# Patient Record
Sex: MALE | Race: BLACK OR AFRICAN AMERICAN | NOT HISPANIC OR LATINO | Employment: UNEMPLOYED | URBAN - METROPOLITAN AREA
[De-identification: names, ages, dates, MRNs, and addresses within clinical notes are randomized per-mention and may not be internally consistent; named-entity substitution may affect disease eponyms.]

---

## 2017-01-12 ENCOUNTER — TELEPHONE (OUTPATIENT)
Dept: PHARMACY | Facility: CLINIC | Age: 47
End: 2017-01-12

## 2017-01-13 ENCOUNTER — TELEPHONE (OUTPATIENT)
Dept: PHARMACY | Facility: CLINIC | Age: 47
End: 2017-01-13

## 2017-01-26 ENCOUNTER — LAB VISIT (OUTPATIENT)
Dept: LAB | Facility: HOSPITAL | Age: 47
End: 2017-01-26
Attending: FAMILY MEDICINE
Payer: MEDICAID

## 2017-01-26 ENCOUNTER — OFFICE VISIT (OUTPATIENT)
Dept: INTERNAL MEDICINE | Facility: CLINIC | Age: 47
End: 2017-01-26
Payer: MEDICAID

## 2017-01-26 VITALS
SYSTOLIC BLOOD PRESSURE: 144 MMHG | HEART RATE: 75 BPM | DIASTOLIC BLOOD PRESSURE: 100 MMHG | BODY MASS INDEX: 40.39 KG/M2 | TEMPERATURE: 99 F | HEIGHT: 69 IN | WEIGHT: 272.69 LBS

## 2017-01-26 DIAGNOSIS — E29.1 MALE HYPOGONADISM: Primary | ICD-10-CM

## 2017-01-26 DIAGNOSIS — D35.2 PROLACTINOMA: ICD-10-CM

## 2017-01-26 DIAGNOSIS — N40.0 BENIGN NON-NODULAR PROSTATIC HYPERPLASIA WITHOUT LOWER URINARY TRACT SYMPTOMS: ICD-10-CM

## 2017-01-26 DIAGNOSIS — E29.1 MALE HYPOGONADISM: ICD-10-CM

## 2017-01-26 LAB
PROLACTIN SERPL IA-MCNC: 4.8 NG/ML
TESTOST SERPL-MCNC: 204 NG/DL

## 2017-01-26 PROCEDURE — 84402 ASSAY OF FREE TESTOSTERONE: CPT

## 2017-01-26 PROCEDURE — 84403 ASSAY OF TOTAL TESTOSTERONE: CPT

## 2017-01-26 PROCEDURE — 84146 ASSAY OF PROLACTIN: CPT

## 2017-01-26 PROCEDURE — 99213 OFFICE O/P EST LOW 20 MIN: CPT | Mod: S$PBB,,, | Performed by: FAMILY MEDICINE

## 2017-01-26 PROCEDURE — 99999 PR PBB SHADOW E&M-EST. PATIENT-LVL III: CPT | Mod: PBBFAC,,, | Performed by: FAMILY MEDICINE

## 2017-01-26 NOTE — PROGRESS NOTES
Subjective:       Patient ID: Grant Rodriguez is a 46 y.o. male.    Chief Complaint: Annual Exam    HPI Comments: Annual Exam;       Pt is a 46 year old who just had labs in July 2016 all was fine. Pt has a prolactinoma and bladder issue. Pt is reporting better bladder flow and will continue current meds. Pt will be followed up for the prolactinoma. Discussed with pt his testosterone level.     Review of Systems   Constitutional: Negative.    Respiratory: Negative.    Cardiovascular: Negative.    Genitourinary: Negative.    Neurological: Negative.    Hematological: Negative.        Objective:      Physical Exam   Constitutional: He is oriented to person, place, and time. He appears well-developed and well-nourished.   Cardiovascular: Normal rate and regular rhythm.    Pulmonary/Chest: Effort normal and breath sounds normal.   Neurological: He is alert and oriented to person, place, and time.       Assessment:       1. Male hypogonadism    2. Benign non-nodular prostatic hyperplasia without lower urinary tract symptoms        Plan:       Male hypogonadism  Comments:  Will do Free and total testosterone  Orders:  -     Testosterone, free; Future; Expected date: 1/26/17  -     Testosterone; Future; Expected date: 1/26/17    Benign non-nodular prostatic hyperplasia without lower urinary tract symptoms  Comments:  Will continue on the Carbegoline.

## 2017-01-26 NOTE — MR AVS SNAPSHOT
[facility]  [address] [address]  Phone: [phone]  Fax: [phone]                  Grant Rodriguez   2017 8:40 AM   Office Visit    Description:  Male : 1970   Provider:  Fernando Jimenez MD   Department:  [facility]           Reason for Visit     Annual Exam                To Do List           Future Appointments        Provider Department Dept Phone    2/3/2017 9:30 AM LABORATORY, [address] [facility] [phone]    2017 9:30 AM LABORATORY, [address] [facility] [phone]    2017 8:20 AM García Mejia IV, MD [facility] [phone]      Goals (5 Years of Data)     None      [facility]     [facility] Care [facility] -  Assistance  Registered nurses in the [facility] provide clinical advisement, health education, appointment booking, and other advisory services.  Call for this free service at [phone].             Medications           Message regarding Medications     Verify the changes and/or additions to your medication regime listed below are the same as discussed with your clinician today.  If any of these changes or additions are incorrect, please notify your healthcare provider.             Verify that the below list of medications is an accurate representation of the medications you are currently taking.  If none reported, the list may be blank. If incorrect, please contact your healthcare provider. Carry this list with you in case of emergency.           Current Medications     cabergoline (DOSTINEX) 0.5 mg tablet 2 Tablets on Monday and Friday, 1 tablet on wed    tamsulosin (FLOMAX) 0.4 mg Cp24 Take 1 capsule (0.4 mg total) by mouth once daily.           Clinical Reference Information           Vital Signs - Last Recorded  Most recent update: 2017  8:20 AM by Suzanne Shay LPN    BP Pulse Temp    (!) 144/100 (BP Location: Right  "arm, Patient Position: Sitting, BP Method: Manual) 75 98.6 °F (37 °C) (Tympanic)    Ht Wt BMI    5' 9" (1.753 m) 123.7 kg (272 lb 11.3 oz) 40.27 kg/m2      Blood Pressure          Most Recent Value    BP  (!)  144/100      Allergies as of 1/26/2017     No Known Allergies      Immunizations Administered on Date of Encounter - 1/26/2017     None      Smoking Cessation     If you would like to quit smoking:   You may be eligible for free services if you are a [place] resident and started smoking cigarettes before September 1, 1988.  Call the Smoking Cessation Trust (SCT) toll free at (585) 371-6132 or (852) 959-7355.   Call 7-045-QUIT-NOW if you do not meet the above criteria.            "

## 2017-01-27 ENCOUNTER — TELEPHONE (OUTPATIENT)
Dept: INTERNAL MEDICINE | Facility: CLINIC | Age: 47
End: 2017-01-27

## 2017-01-27 DIAGNOSIS — J06.9 ACUTE URI: Primary | ICD-10-CM

## 2017-01-27 RX ORDER — AMOXICILLIN 500 MG/1
500 TABLET, FILM COATED ORAL EVERY 12 HOURS
Qty: 14 TABLET | Refills: 0 | Status: SHIPPED | OUTPATIENT
Start: 2017-01-27 | End: 2017-02-03

## 2017-01-27 NOTE — TELEPHONE ENCOUNTER
----- Message from Jordyn Jordan NP sent at 1/27/2017  2:03 PM CST -----  Inform patient that I am sending in antibiotic for him. F/u with Dr. Jimenez next week if symptoms worsen.   ----- Message -----     From: Charline Rodriguez     Sent: 1/27/2017  11:52 AM       To: Jordyn Jordan NP    Patient requesting med for nasal congestion with green mucus, dry cough.  Seen on yesterday by dr. Jimenez, says he forgot to discuss and today feels bad

## 2017-01-29 LAB — TESTOST FREE SERPL-MCNC: 6 PG/ML

## 2017-02-05 ENCOUNTER — PATIENT MESSAGE (OUTPATIENT)
Dept: UROLOGY | Facility: CLINIC | Age: 47
End: 2017-02-05

## 2017-02-06 ENCOUNTER — PATIENT MESSAGE (OUTPATIENT)
Dept: UROLOGY | Facility: CLINIC | Age: 47
End: 2017-02-06

## 2017-02-16 ENCOUNTER — OFFICE VISIT (OUTPATIENT)
Dept: UROLOGY | Facility: CLINIC | Age: 47
End: 2017-02-16
Payer: MEDICAID

## 2017-02-16 VITALS
DIASTOLIC BLOOD PRESSURE: 98 MMHG | BODY MASS INDEX: 40.08 KG/M2 | WEIGHT: 271.38 LBS | SYSTOLIC BLOOD PRESSURE: 152 MMHG | HEART RATE: 68 BPM

## 2017-02-16 DIAGNOSIS — D35.2 PROLACTINOMA: Primary | ICD-10-CM

## 2017-02-16 DIAGNOSIS — D35.3 BENIGN NEOPLASM OF PITUITARY GLAND AND CRANIOPHARYNGEAL DUCT (POUCH): ICD-10-CM

## 2017-02-16 DIAGNOSIS — E29.1 HYPOGONADISM IN MALE: ICD-10-CM

## 2017-02-16 DIAGNOSIS — D35.2 BENIGN NEOPLASM OF PITUITARY GLAND AND CRANIOPHARYNGEAL DUCT (POUCH): ICD-10-CM

## 2017-02-16 PROCEDURE — 99212 OFFICE O/P EST SF 10 MIN: CPT | Mod: PBBFAC | Performed by: UROLOGY

## 2017-02-16 PROCEDURE — 99999 PR PBB SHADOW E&M-EST. PATIENT-LVL II: CPT | Mod: PBBFAC,,, | Performed by: UROLOGY

## 2017-02-16 PROCEDURE — 99214 OFFICE O/P EST MOD 30 MIN: CPT | Mod: S$PBB,,, | Performed by: UROLOGY

## 2017-02-16 PROCEDURE — 96372 THER/PROPH/DIAG INJ SC/IM: CPT | Mod: PBBFAC

## 2017-02-16 RX ORDER — CABERGOLINE 0.5 MG/1
TABLET ORAL
Qty: 48 TABLET | Refills: 11 | Status: SHIPPED | OUTPATIENT
Start: 2017-02-16 | End: 2017-08-21 | Stop reason: SDUPTHER

## 2017-02-16 RX ORDER — TAMSULOSIN HYDROCHLORIDE 0.4 MG/1
0.4 CAPSULE ORAL DAILY
Qty: 30 CAPSULE | Refills: 11 | Status: SHIPPED | OUTPATIENT
Start: 2017-02-16 | End: 2018-05-23 | Stop reason: SDUPTHER

## 2017-02-16 RX ORDER — TESTOSTERONE CYPIONATE 200 MG/ML
200 INJECTION, SOLUTION INTRAMUSCULAR ONCE
Status: COMPLETED | OUTPATIENT
Start: 2017-02-16 | End: 2017-02-16

## 2017-02-16 RX ORDER — TESTOSTERONE CYPIONATE 200 MG/ML
200 INJECTION, SOLUTION INTRAMUSCULAR
Qty: 10 ML | Refills: 1 | Status: SHIPPED | OUTPATIENT
Start: 2017-02-16 | End: 2017-02-16 | Stop reason: SDUPTHER

## 2017-02-16 RX ORDER — TESTOSTERONE CYPIONATE 200 MG/ML
200 INJECTION, SOLUTION INTRAMUSCULAR
Qty: 10 ML | Refills: 1 | Status: SHIPPED | OUTPATIENT
Start: 2017-02-16 | End: 2017-07-17 | Stop reason: SDUPTHER

## 2017-02-16 RX ADMIN — TESTOSTERONE CYPIONATE 200 MG: 200 INJECTION, SOLUTION INTRAMUSCULAR at 04:02

## 2017-02-16 NOTE — MR AVS SNAPSHOT
[facility]  [address] [facility] [address]  Phone: [phone]  Fax: [phone]                  Grant Rodriguez   2017 4:00 PM   Office Visit    Description:  Male : 1970   Provider:  García Mejia IV, MD   Department:  [facility]           Diagnoses this Visit        Comments    Prolactinoma    -  Primary     Hypogonadism in male         Benign neoplasm of pituitary gland and craniopharyngeal duct (pouch)                To Do List           Future Appointments        Provider Department Dept Phone    2017 10:40 AM LABORATORY, [address] [facility] [phone]    2017 2:00 PM García Mejia IV, MD [facility] [phone]      Goals (5 Years of Data)     None      Follow-Up and Disposition     Return in about 6 months (around 2017).    Follow-up and Disposition History       These Medications        Disp Refills Start End    cabergoline (DOSTINEX) 0.5 mg tablet 48 tablet 11 2017     2 Tablets on Monday and Friday, 1 tablet on wed    [place] [facility] [place] [address] Ph #: [phone]       tamsulosin (FLOMAX) 0.4 mg Cp24 30 capsule 11 2017    Take 1 capsule (0.4 mg total) by mouth once daily. - Oral    [place] [facility] [place] [address] Ph #: [phone]       testosterone cypionate (DEPOTESTOTERONE CYPIONATE) 200 mg/mL injection 10 mL 1 2017    Inject 1 mL (200 mg total) into the muscle every 21 days. - Intramuscular    Pharmacy: [facility] [place] [address] Ph #: [phone]         [facility]     [facility] Nurse Care Line -  Assistance  Registered nurses in the [facility] provide clinical advisement, health education, appointment booking, and other advisory services.  Call for this free service at [phone].              Medications           Message regarding Medications     Verify the changes and/or additions to your medication regime listed below are the same as discussed with your clinician today.  If any of these changes or additions are incorrect, please notify your healthcare provider.        START taking these NEW medications        Refills    testosterone cypionate (DEPOTESTOTERONE CYPIONATE) 200 mg/mL injection 1    Sig: Inject 1 mL (200 mg total) into the muscle every 21 days.    Class: Normal    Route: Intramuscular      These medications were administered today        Dose Freq    testosterone cypionate injection 200 mg 200 mg Once    Sig: Inject 1 mL (200 mg total) into the muscle once.    Class: Normal    Route: Intramuscular           Verify that the below list of medications is an accurate representation of the medications you are currently taking.  If none reported, the list may be blank. If incorrect, please contact your healthcare provider. Carry this list with you in case of emergency.           Current Medications     cabergoline (DOSTINEX) 0.5 mg tablet 2 Tablets on Monday and Friday, 1 tablet on wed    tamsulosin (FLOMAX) 0.4 mg Cp24 Take 1 capsule (0.4 mg total) by mouth once daily.    testosterone cypionate (DEPOTESTOTERONE CYPIONATE) 200 mg/mL injection Inject 1 mL (200 mg total) into the muscle every 21 days.           Clinical Reference Information           Your Vitals Were     BP Pulse Weight BMI       152/98 (BP Location: Left arm, Patient Position: Sitting) 68 123.1 kg (271 lb 6.2 oz) 40.08 kg/m2       Blood Pressure          Most Recent Value    BP  (!)  152/98      Allergies as of 2/16/2017     No Known Allergies      Immunizations Administered on Date of Encounter - 2/16/2017     None      Orders Placed During Today's Visit     Future Labs/Procedures Expected by Expires    Hematocrit  2/16/2017 4/17/2018    Hepatic function panel  2/16/2017 4/17/2018    Prolactin  2/16/2017 4/17/2018       Smoking Cessation     If you would like to quit smoking:   You may be eligible for free services if you are a [place] resident and started smoking cigarettes before September 1, 1988.  Call the Smoking Cessation Trust (SCT) toll free at (511) 133-6413 or (189) 344-4814.   Call 1-800-QUIT-NOW if you do not meet the above criteria.            Language Assistance Services     ATTENTION: Language assistance services are available, free of charge. Please call [phone].      ATENCIÓN: Si habla amado, tiene a madrid disposición servicios gratuitos de asistencia lingüística. Llame al [phone].     CHÚ Ý: N?u b?n nói Ti?ng Vi?t, có các d?ch v? h? tr? ngôn ng? mi?n phí dành cho b?n. G?i s? [phone].         [facility] complies with applicable Federal civil rights laws and does not discriminate on the basis of race, color, national origin, age, disability, or sex.

## 2017-02-16 NOTE — PROGRESS NOTES
Patient was taught how to self administer depotestosterone.. Hands were washed and supplies were gathered. Explained to pt supplies needed, including medication, needle, alcohol wipe, bandaid and sharps container.  Pt successfully juliana up 200mg of Depo and injected it into his left outer thigh. No bleeding noted; pt waited in room for 20 minutes; no adverse reaction noted.

## 2017-02-16 NOTE — PROGRESS NOTES
"Chief Complaint: Nocturia    HPI:   2/16/17: Prolactin normal on cabergoline, T is low till.  Voiding fine no new problems.    8/3/16: No abd/pelvic pain and no exac/rel factors.  No hematuria.  No urolithiasis.  No urinary bother.  No  history.  Normal sexual function.  Recent prolactin very high; free T mildly low.  Referred by Dr. Jimenez. Had stopped cabergoline sometime 11/15 but has recently restarted it.  Was off flomax and having nocturia x8 after 3-4 in the morning; trouble getting all the way empty lots of double voiding. Has restarted flomax and now the nocturia has improved.  Has not been on T in a year and a half and hasn't missed it.  Drinks cokes some days.  Constipation is a problem.  10/12: Shraddha: "Grant Rodriguez is a 42 y.o. male with a history of prolactinoma.  He is currently on cabergoline for this.  He is also on TRT via IM injections.  He is here to discuss possible testopel.  He also has some LUTS.  He has nocturia x 3, decreased FOS, + straining.  He denies urgency.  He would like to try an alpha blocker. He denies ED.  -> was given flomax; did not return for testopel.    Allergies:  Review of patient's allergies indicates no known allergies.    Medications:  has a current medication list which includes the following prescription(s): cabergoline and tamsulosin.    Review of Systems:  General: No fever, chills, fatigability, or weight loss.  Skin: No rashes, itching, or changes in color or texture of skin.  Chest: Denies LR, cyanosis, wheezing, cough, and sputum production.  Abdomen: Appetite fine. No weight loss. Denies diarrhea, abdominal pain, hematemesis, or blood in stool.  Musculoskeletal: No joint stiffness or swelling. Denies back pain.  : As above.  All other review of systems negative.    PMH:   has a past medical history of Hypogonadism, male; IGT (impaired glucose tolerance); Obesity; and Prolactinoma.    PSH:   has no past surgical history on file.    FamHx: family history " includes Diabetes in his father; Hypertension in his father. There is no history of Thyroid disease or Calcium disorder.    SocHx:  reports that he has been smoking.  He has a 20.00 pack-year smoking history. He does not have any smokeless tobacco history on file. He reports that he does not drink alcohol or use illicit drugs.      Physical Exam:  Vitals:    02/16/17 1541   BP: (!) 152/98   Pulse: 68     General: A&Ox3, no apparent distress, no deformities  Neck: No masses, normal thyroid  Lungs: normal inspiration, no use of accessory muscles  Heart: normal pulse, no arrhythmias  Abdomen: Soft, NT, ND, no masses, no hernias, no hepatosplenomegaly  Lymphatic: Neck and groin nodes negative  Skin: The skin is warm and dry. No jaundice.  Ext: No c/c/e.  :   8/3/16: Test desc alethea, no abnormalities of epididymus. Penis normal, with normal penile and scrotal skin. Meatus normal. Normal rectal tone, no hemorrhoids. Prost 30 gm no nodules or masses appreciated. SV not palpable. Perineum and anus normal.    Labs/Studies:   Urinalysis performed in clinic, summary: UA normal  Bladder Scan performed in office:     8/3/16: PVR 15 ml.  PSA    7/16: 0.5    Impression/Plan:   1. Flomax is doing the job for LUTS; PSA is low.  2. Cabergoline should not be stopped.  Elevated prolactin shifts FSH/LH and T will improve on this regimen.  Prolactin 6 mo to check efficacy.  3. T 200mg q3wks, teaching today and RTC 6 mo with labs.

## 2017-03-03 ENCOUNTER — TELEPHONE (OUTPATIENT)
Dept: PHARMACY | Facility: CLINIC | Age: 47
End: 2017-03-03

## 2017-03-03 NOTE — TELEPHONE ENCOUNTER
PA approval information:  [employer] [facility] [employer]  [phone]  Testosterone 200mg/ml 1ml vial per 21 days

## 2017-04-21 ENCOUNTER — TELEPHONE (OUTPATIENT)
Dept: PHARMACY | Facility: CLINIC | Age: 47
End: 2017-04-21

## 2017-04-21 NOTE — TELEPHONE ENCOUNTER
Called to notify patient PA re-authorized for Testosterone 200 MG/mL vial.    Patient indicated he will  on Monday 4/24/17.    PA Information:  [employer]  [phone]  Approved for 1mL per 21 days w/ 3 refills between 4/21/17-7/21/17.

## 2017-05-11 ENCOUNTER — OFFICE VISIT (OUTPATIENT)
Dept: INTERNAL MEDICINE | Facility: CLINIC | Age: 47
End: 2017-05-11
Payer: MEDICAID

## 2017-05-11 ENCOUNTER — TELEPHONE (OUTPATIENT)
Dept: PHARMACY | Facility: CLINIC | Age: 47
End: 2017-05-11

## 2017-05-11 VITALS
DIASTOLIC BLOOD PRESSURE: 98 MMHG | HEART RATE: 82 BPM | BODY MASS INDEX: 39.51 KG/M2 | HEIGHT: 69 IN | TEMPERATURE: 97 F | SYSTOLIC BLOOD PRESSURE: 140 MMHG | WEIGHT: 266.75 LBS

## 2017-05-11 DIAGNOSIS — F98.8 ADD (ATTENTION DEFICIT DISORDER): Primary | ICD-10-CM

## 2017-05-11 PROCEDURE — 99213 OFFICE O/P EST LOW 20 MIN: CPT | Mod: PBBFAC,PO | Performed by: FAMILY MEDICINE

## 2017-05-11 PROCEDURE — 99214 OFFICE O/P EST MOD 30 MIN: CPT | Mod: S$PBB,,, | Performed by: FAMILY MEDICINE

## 2017-05-11 PROCEDURE — 99999 PR PBB SHADOW E&M-EST. PATIENT-LVL III: CPT | Mod: PBBFAC,,, | Performed by: FAMILY MEDICINE

## 2017-05-11 RX ORDER — DEXTROAMPHETAMINE SACCHARATE, AMPHETAMINE ASPARTATE, DEXTROAMPHETAMINE SULFATE AND AMPHETAMINE SULFATE 5; 5; 5; 5 MG/1; MG/1; MG/1; MG/1
1 TABLET ORAL 2 TIMES DAILY
Qty: 60 TABLET | Refills: 0 | Status: SHIPPED | OUTPATIENT
Start: 2017-05-11 | End: 2018-05-28

## 2017-05-11 NOTE — MR AVS SNAPSHOT
[facility]  [address] [address]  Phone: [phone]  Fax: [phone]                  Grant Rodriguez   2017 10:20 AM   Office Visit    Description:  Male : 1970   Provider:  Fernando Jimenez MD   Department:  [facility]           Reason for Visit     trouble focusing           Diagnoses this Visit        Comments    ADD (attention deficit disorder)    -  Primary Will start pt Adderall 20 mg bid.            To Do List           Future Appointments        Provider Department Dept Phone    2017 3:00 PM Fernando Jimenez MD [facility] [phone]    5/15/2017 9:40 AM Fernando Jimenez MD [facility] [phone]    2017 10:40 AM LABORATORY, [address] [facility] [phone]    2017 2:00 PM García Mejia IV, MD [facility] [phone]      Goals (5 Years of Data)     None      Follow-Up and Disposition     Return in about 3 months (around 2017).       These Medications        Disp Refills Start End    dextroamphetamine-amphetamine (ADDERALL) 20 mg tablet 60 tablet 0 2017     Take 1 tablet by mouth 2 (two) times daily. - Oral    Pharmacy: [facility] [address] Ph #: [phone]         [facility]     [facility] Nurse Care Line -  Assistance  Unless otherwise directed by your provider, please contact [facility], our nurse care line that is available for  assistance.     Registered nurses in the [facility] provide: appointment scheduling, clinical advisement, health education, and other advisory services.  Call: [phone] (toll free)               Medications           Message regarding Medications     Verify the changes and/or additions to your medication regime listed below are the same as discussed with your clinician today.  If any of these changes or additions are  "incorrect, please notify your healthcare provider.        START taking these NEW medications        Refills    dextroamphetamine-amphetamine (ADDERALL) 20 mg tablet 0    Sig: Take 1 tablet by mouth 2 (two) times daily.    Class: Normal    Route: Oral           Verify that the below list of medications is an accurate representation of the medications you are currently taking.  If none reported, the list may be blank. If incorrect, please contact your healthcare provider. Carry this list with you in case of emergency.           Current Medications     cabergoline (DOSTINEX) 0.5 mg tablet 2 Tablets on Monday and Friday, 1 tablet on wed    tamsulosin (FLOMAX) 0.4 mg Cp24 Take 1 capsule (0.4 mg total) by mouth once daily.    dextroamphetamine-amphetamine (ADDERALL) 20 mg tablet Take 1 tablet by mouth 2 (two) times daily.    testosterone cypionate (DEPOTESTOTERONE CYPIONATE) 200 mg/mL injection Inject 1 mL (200 mg total) into the muscle every 21 days.           Clinical Reference Information           Your Vitals Were     BP Pulse Temp Height Weight BMI    140/98 82 97.3 °F (36.3 °C) (Tympanic) 5' 9" (1.753 m) 121 kg (266 lb 12.1 oz) 39.39 kg/m2      Blood Pressure          Most Recent Value    BP  (!)  140/98      Allergies as of 5/11/2017     No Known Allergies      Immunizations Administered on Date of Encounter - 5/11/2017     None      Smoking Cessation     If you would like to quit smoking:   You may be eligible for free services if you are a [place] resident and started smoking cigarettes before September 1, 1988.  Call the Smoking Cessation Trust ([facility]) toll free at (324) 793-1424 or (653) 725-7882.   Call 9-800-QUIT-NOW if you do not meet the above criteria.   Contact us via email: [email or web address]   View our website for more information: [email or web address]        Language Assistance Services     ATTENTION: Language assistance services are available, free of charge. Please call " [phone].      ATENCIÓN: Si habla español, tiene a madrid disposición servicios gratuitos de asistencia lingüística. Llame al [phone].     CHÚ Ý: N?u b?n nói Ti?ng Vi?t, có các d?ch v? h? tr? ngôn ng? mi?n phí dành cho b?n. G?i s? [phone].         [facility] complies with applicable Federal civil rights laws and does not discriminate on the basis of race, color, national origin, age, disability, or sex.

## 2017-05-11 NOTE — TELEPHONE ENCOUNTER
Patient came to  generic Adderall prescription at pharmacy.    Notified patient prescription required a PA on his insurance and that we would begin the process.    Patient verbalized understanding.    [facility] @ fax # [phone].

## 2017-05-11 NOTE — PROGRESS NOTES
Subjective:       Patient ID: Grant Rodriguez is a 47 y.o. male.    Chief Complaint: trouble focusing (possible adhd)    HPI Comments: ADD:       Pt is a 47 year old who has struggled with need for concentration and attention. Pt reports that he is unable to study more than 30 min. Pt reports that he has had some increase problems with this in the past. Pt is currently studying for the Adderall.     Review of Systems   Constitutional: Negative.    Respiratory: Negative.    Genitourinary: Negative.    Neurological: Negative.    Psychiatric/Behavioral: Negative for decreased concentration, dysphoric mood and sleep disturbance. The patient is not nervous/anxious and is not hyperactive.        Objective:      Physical Exam   Constitutional: He is oriented to person, place, and time. He appears well-developed and well-nourished.   Cardiovascular: Normal rate and regular rhythm.    Pulmonary/Chest: Effort normal and breath sounds normal.   Neurological: He is alert and oriented to person, place, and time.   Psychiatric: He has a normal mood and affect. His behavior is normal.       Assessment:       1. ADD (attention deficit disorder)        Plan:       ADD (attention deficit disorder)  Comments:  Will start pt Adderall 20 mg bid.     Other orders  -     dextroamphetamine-amphetamine (ADDERALL) 20 mg tablet; Take 1 tablet by mouth 2 (two) times daily.  Dispense: 60 tablet; Refill: 0

## 2017-05-15 ENCOUNTER — TELEPHONE (OUTPATIENT)
Dept: PHARMACY | Facility: CLINIC | Age: 47
End: 2017-05-15

## 2017-05-23 ENCOUNTER — TELEPHONE (OUTPATIENT)
Dept: SMOKING CESSATION | Facility: CLINIC | Age: 47
End: 2017-05-23

## 2017-05-23 NOTE — TELEPHONE ENCOUNTER
Patient was a No Show for intake appointment.  Called  Mobile number on file and was unable to speak with patient. Left message with the following information; [facility], [phone]. Encouraged patient to call back to reschedule missed appointment.

## 2017-05-30 ENCOUNTER — TELEPHONE (OUTPATIENT)
Dept: SMOKING CESSATION | Facility: CLINIC | Age: 47
End: 2017-05-30

## 2017-05-30 NOTE — TELEPHONE ENCOUNTER
Attempt to contact patient in regards to his missed scheduled appointment. Recorded message left with return contact information.

## 2017-06-05 ENCOUNTER — PATIENT MESSAGE (OUTPATIENT)
Dept: URGENT CARE | Facility: CLINIC | Age: 47
End: 2017-06-05

## 2017-07-17 RX ORDER — TESTOSTERONE CYPIONATE 200 MG/ML
INJECTION, SOLUTION INTRAMUSCULAR
Qty: 10 ML | Refills: 1 | Status: SHIPPED | OUTPATIENT
Start: 2017-07-17 | End: 2017-08-21 | Stop reason: SDUPTHER

## 2017-08-21 ENCOUNTER — LAB VISIT (OUTPATIENT)
Dept: LAB | Facility: HOSPITAL | Age: 47
End: 2017-08-21
Attending: UROLOGY
Payer: MEDICAID

## 2017-08-21 ENCOUNTER — OFFICE VISIT (OUTPATIENT)
Dept: UROLOGY | Facility: CLINIC | Age: 47
End: 2017-08-21
Payer: MEDICAID

## 2017-08-21 VITALS — DIASTOLIC BLOOD PRESSURE: 96 MMHG | WEIGHT: 269.5 LBS | BODY MASS INDEX: 39.8 KG/M2 | SYSTOLIC BLOOD PRESSURE: 162 MMHG

## 2017-08-21 DIAGNOSIS — D35.3 BENIGN NEOPLASM OF PITUITARY GLAND AND CRANIOPHARYNGEAL DUCT (POUCH): ICD-10-CM

## 2017-08-21 DIAGNOSIS — D35.2 PROLACTINOMA: Primary | ICD-10-CM

## 2017-08-21 DIAGNOSIS — D35.2 BENIGN NEOPLASM OF PITUITARY GLAND AND CRANIOPHARYNGEAL DUCT (POUCH): ICD-10-CM

## 2017-08-21 DIAGNOSIS — D35.2 PROLACTINOMA: ICD-10-CM

## 2017-08-21 LAB
ALBUMIN SERPL BCP-MCNC: 3.4 G/DL
ALP SERPL-CCNC: 70 U/L
ALT SERPL W/O P-5'-P-CCNC: 22 U/L
AST SERPL-CCNC: 17 U/L
BILIRUB DIRECT SERPL-MCNC: 0.2 MG/DL
BILIRUB SERPL-MCNC: 0.4 MG/DL
BILIRUB SERPL-MCNC: NORMAL MG/DL
BLOOD URINE, POC: NORMAL
COLOR, POC UA: YELLOW
COMPLEXED PSA SERPL-MCNC: 0.91 NG/ML
GLUCOSE UR QL STRIP: NORMAL
HCT VFR BLD AUTO: 45.8 %
KETONES UR QL STRIP: NORMAL
LEUKOCYTE ESTERASE URINE, POC: NORMAL
NITRITE, POC UA: NORMAL
PH, POC UA: 5
PROLACTIN SERPL IA-MCNC: 4.2 NG/ML
PROT SERPL-MCNC: 7.2 G/DL
PROTEIN, POC: NORMAL
SPECIFIC GRAVITY, POC UA: 1.01
UROBILINOGEN, POC UA: NORMAL

## 2017-08-21 PROCEDURE — 99214 OFFICE O/P EST MOD 30 MIN: CPT | Mod: S$PBB,,, | Performed by: UROLOGY

## 2017-08-21 PROCEDURE — 99999 PR PBB SHADOW E&M-EST. PATIENT-LVL II: CPT | Mod: PBBFAC,,, | Performed by: UROLOGY

## 2017-08-21 PROCEDURE — 80076 HEPATIC FUNCTION PANEL: CPT

## 2017-08-21 PROCEDURE — 84153 ASSAY OF PSA TOTAL: CPT

## 2017-08-21 PROCEDURE — 36415 COLL VENOUS BLD VENIPUNCTURE: CPT

## 2017-08-21 PROCEDURE — 3008F BODY MASS INDEX DOCD: CPT | Mod: ,,, | Performed by: UROLOGY

## 2017-08-21 PROCEDURE — 85014 HEMATOCRIT: CPT

## 2017-08-21 PROCEDURE — 3080F DIAST BP >= 90 MM HG: CPT | Mod: ,,, | Performed by: UROLOGY

## 2017-08-21 PROCEDURE — 3077F SYST BP >= 140 MM HG: CPT | Mod: ,,, | Performed by: UROLOGY

## 2017-08-21 PROCEDURE — 84146 ASSAY OF PROLACTIN: CPT

## 2017-08-21 RX ORDER — TESTOSTERONE CYPIONATE 200 MG/ML
INJECTION, SOLUTION INTRAMUSCULAR
Qty: 10 ML | Refills: 1 | Status: SHIPPED | OUTPATIENT
Start: 2017-08-21 | End: 2017-09-14 | Stop reason: SDUPTHER

## 2017-08-21 RX ORDER — CABERGOLINE 0.5 MG/1
TABLET ORAL
Qty: 48 TABLET | Refills: 11 | Status: SHIPPED | OUTPATIENT
Start: 2017-08-21 | End: 2018-06-12 | Stop reason: SDUPTHER

## 2017-08-21 NOTE — PROGRESS NOTES
"Chief Complaint: Nocturia    HPI:   8/21/17: Labs not back yet.  No new problems.  Does have some LUTS when he gets constipated; reviewed in detail rx for miralax.  2/16/17: Prolactin normal on cabergoline, T is low till.  Voiding fine no new problems.    8/3/16: No abd/pelvic pain and no exac/rel factors.  No hematuria.  No urolithiasis.  No urinary bother.  No  history.  Normal sexual function.  Recent prolactin very high; free T mildly low.  Referred by Dr. Jimenez. Had stopped cabergoline sometime 11/15 but has recently restarted it.  Was off flomax and having nocturia x8 after 3-4 in the morning; trouble getting all the way empty lots of double voiding. Has restarted flomax and now the nocturia has improved.  Has not been on T in a year and a half and hasn't missed it.  Drinks cokes some days.  Constipation is a problem.  10/12: Shraddha: "Grant Rodriguez is a 42 y.o. male with a history of prolactinoma.  He is currently on cabergoline for this.  He is also on TRT via IM injections.  He is here to discuss possible testopel.  He also has some LUTS.  He has nocturia x 3, decreased FOS, + straining.  He denies urgency.  He would like to try an alpha blocker. He denies ED.  -> was given flomax; did not return for testopel.    Allergies:  Review of patient's allergies indicates no known allergies.    Medications:  has a current medication list which includes the following prescription(s): cabergoline, dextroamphetamine-amphetamine, tamsulosin, and testosterone cypionate.    Review of Systems:  General: No fever, chills, fatigability, or weight loss.  Skin: No rashes, itching, or changes in color or texture of skin.  Chest: Denies LR, cyanosis, wheezing, cough, and sputum production.  Abdomen: Appetite fine. No weight loss. Denies diarrhea, abdominal pain, hematemesis, or blood in stool.  Musculoskeletal: No joint stiffness or swelling. Denies back pain.  : As above.  All other review of systems negative.    PMH:   has a " past medical history of Hypogonadism, male; IGT (impaired glucose tolerance); Obesity; and Prolactinoma.    PSH:   has no past surgical history on file.    FamHx: family history includes Diabetes in his father; Hypertension in his father.    SocHx:  reports that he has been smoking.  He has a 20.00 pack-year smoking history. He does not have any smokeless tobacco history on file. He reports that he does not drink alcohol or use drugs.      Physical Exam:  There were no vitals filed for this visit.  General: A&Ox3, no apparent distress, no deformities  Neck: No masses, normal thyroid  Lungs: normal inspiration, no use of accessory muscles  Heart: normal pulse, no arrhythmias  Abdomen: Soft, NT, ND  Skin: The skin is warm and dry. No jaundice.  Ext: No c/c/e.  :   8/3/16: Test desc alethea, no abnormalities of epididymus. Penis normal, with normal penile and scrotal skin. Meatus normal. Normal rectal tone, no hemorrhoids. Prost 30 gm no nodules or masses appreciated. SV not palpable. Perineum and anus normal.    Labs/Studies:   Urinalysis performed in clinic, summary: UA normal  Bladder Scan performed in office:     8/3/16: PVR 15 ml.  PSA    7/16: 0.5    Impression/Plan:   1. Flomax is doing the job for LUTS; PSA was low.  Ordering labs today.  Hct/LFT/Prolactin in 6 mo.  EMILIANO next visit.  2. Cabergoline should not be stopped.  Elevated prolactin shifts FSH/LH and T will improve on this regimen.    3. T 200mg q3wks, RTC 6 mo with labs.  4. Miralax for constipation

## 2017-09-14 ENCOUNTER — PATIENT MESSAGE (OUTPATIENT)
Dept: UROLOGY | Facility: CLINIC | Age: 47
End: 2017-09-14

## 2017-09-14 RX ORDER — TESTOSTERONE CYPIONATE 200 MG/ML
INJECTION, SOLUTION INTRAMUSCULAR
Qty: 10 ML | Refills: 1 | Status: SHIPPED | OUTPATIENT
Start: 2017-09-14 | End: 2018-06-12 | Stop reason: SDUPTHER

## 2017-09-14 NOTE — TELEPHONE ENCOUNTER
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 He just received an Rx during his office visit on 8/21/17... Please advise.

## 2017-09-15 ENCOUNTER — TELEPHONE (OUTPATIENT)
Dept: UROLOGY | Facility: CLINIC | Age: 47
End: 2017-09-15

## 2017-09-15 NOTE — TELEPHONE ENCOUNTER
----- Message from Andrew Guidry sent at 9/15/2017 10:28 AM CDT -----  Contact: pt  Call in regards to a missed call,[phone] (bfpn)

## 2017-12-22 ENCOUNTER — TELEPHONE (OUTPATIENT)
Dept: PHARMACY | Facility: CLINIC | Age: 47
End: 2017-12-22

## 2017-12-22 NOTE — TELEPHONE ENCOUNTER
Patient notified PA required on Cabergoline medication.  Patient very familiar with the process as PAs have been required before.      Will notify patient once approved.    (PA has been submitted to [facility] [facility] 12/22/17)

## 2018-02-06 ENCOUNTER — TELEPHONE (OUTPATIENT)
Dept: PHARMACY | Facility: CLINIC | Age: 48
End: 2018-02-06

## 2018-02-06 NOTE — TELEPHONE ENCOUNTER
Called and spoke with patient notifying him PA on Testosterone was required on new insurance.    PA was submitted on 2/6/18 and approved for $27.82 copayment.  Patient was thankful and appreciated the call.    PA Information:  [employer]  [phone]  Case ID #: [id number]  Approved for 3 month supply on 2/6/18   Approval Dates: 12/10/17-2/6/19    Please let me know if you any questions.     Thanks,   Kim Parada  Patient Care Advocate   [facility]  Phone: [phone]  Fax: [phone]

## 2018-05-10 ENCOUNTER — PATIENT OUTREACH (OUTPATIENT)
Dept: ADMINISTRATIVE | Facility: HOSPITAL | Age: 48
End: 2018-05-10

## 2018-05-10 NOTE — PROGRESS NOTES
Spoke with patient re scheduling appt. Pt [facility]. Pt states he is out of town.

## 2018-05-23 RX ORDER — TESTOSTERONE CYPIONATE 200 MG/ML
INJECTION, SOLUTION INTRAMUSCULAR
Qty: 10 ML | Refills: 1 | Status: CANCELLED | OUTPATIENT
Start: 2018-05-23

## 2018-05-23 RX ORDER — TAMSULOSIN HYDROCHLORIDE 0.4 MG/1
CAPSULE ORAL DAILY
Qty: 30 CAPSULE | Refills: 11 | Status: CANCELLED | OUTPATIENT
Start: 2018-05-23 | End: 2019-05-23

## 2018-05-23 NOTE — TELEPHONE ENCOUNTER
Patient requesting refill for Testosterone Cypionate and Flomax. Called to inform patient that he will need a visit for Testosterone refill as he was last seen in August 2017 and is overdue for his 6 month follow up. No answer; left message. Please send refill for Flomax.

## 2018-05-24 ENCOUNTER — TELEPHONE (OUTPATIENT)
Dept: UROLOGY | Facility: CLINIC | Age: 48
End: 2018-05-24

## 2018-05-24 RX ORDER — TAMSULOSIN HYDROCHLORIDE 0.4 MG/1
0.4 CAPSULE ORAL DAILY
Qty: 30 CAPSULE | Refills: 11 | Status: SHIPPED | OUTPATIENT
Start: 2018-05-24 | End: 2018-06-12 | Stop reason: SDUPTHER

## 2018-05-24 NOTE — TELEPHONE ENCOUNTER
----- Message from Boone Kramer sent at 5/24/2018  2:38 PM CDT -----  Contact: pt   States he's rtn nurses call and can be reached at [phone]//[email or web address]

## 2018-05-24 NOTE — TELEPHONE ENCOUNTER
----- Message from Jennie Koroma sent at 5/24/2018 12:24 PM CDT -----  Contact: self [phone]  States that he needs to be worked in for an appt for follow up and med renewal. Please call back at [phone]//thank you acc

## 2018-05-28 ENCOUNTER — OFFICE VISIT (OUTPATIENT)
Dept: INTERNAL MEDICINE | Facility: CLINIC | Age: 48
End: 2018-05-28
Payer: COMMERCIAL

## 2018-05-28 VITALS
HEIGHT: 69 IN | SYSTOLIC BLOOD PRESSURE: 139 MMHG | DIASTOLIC BLOOD PRESSURE: 88 MMHG | TEMPERATURE: 97 F | OXYGEN SATURATION: 98 % | WEIGHT: 287.25 LBS | HEART RATE: 71 BPM | BODY MASS INDEX: 42.54 KG/M2

## 2018-05-28 DIAGNOSIS — D35.2 PROLACTINOMA: ICD-10-CM

## 2018-05-28 DIAGNOSIS — E29.1 MALE HYPOGONADISM: ICD-10-CM

## 2018-05-28 DIAGNOSIS — Z00.00 ANNUAL PHYSICAL EXAM: Primary | ICD-10-CM

## 2018-05-28 PROCEDURE — 99999 PR PBB SHADOW E&M-EST. PATIENT-LVL III: CPT | Mod: PBBFAC,,, | Performed by: FAMILY MEDICINE

## 2018-05-28 PROCEDURE — 3079F DIAST BP 80-89 MM HG: CPT | Mod: CPTII,S$GLB,, | Performed by: FAMILY MEDICINE

## 2018-05-28 PROCEDURE — 3075F SYST BP GE 130 - 139MM HG: CPT | Mod: CPTII,S$GLB,, | Performed by: FAMILY MEDICINE

## 2018-05-28 PROCEDURE — 99396 PREV VISIT EST AGE 40-64: CPT | Mod: S$GLB,,, | Performed by: FAMILY MEDICINE

## 2018-05-28 NOTE — PROGRESS NOTES
Subjective:       Patient ID: Grant Rodriguez is a 48 y.o. male.    Chief Complaint: Annual Exam    Annual exam:      Pt is a 48 year old here for annual exam. Pt has hypogonadism and prolactinemia. Followed by Urology. Discussed with pt weight issues      Review of Systems   Constitutional: Negative.    Respiratory: Negative.    Cardiovascular: Negative.    Neurological: Negative.    Hematological: Negative.    Psychiatric/Behavioral: Negative.        Objective:      Physical Exam   Constitutional: He appears well-developed and well-nourished.   Cardiovascular: Normal rate and regular rhythm.  Exam reveals no gallop and no friction rub.    Pulmonary/Chest: Effort normal and breath sounds normal.   Abdominal: Soft. Bowel sounds are normal.   Skin: Skin is warm and dry.   Psychiatric: He has a normal mood and affect. His behavior is normal.       Assessment:       1. Annual physical exam    2. Male hypogonadism    3. Prolactinoma        Plan:       Annual physical exam  Comments:  Will do CBC, CMP and lipid with PSA  Orders:  -     CBC auto differential; Future; Expected date: 05/28/2018  -     Comprehensive metabolic panel; Future; Expected date: 05/28/2018  -     Lipid panel; Future; Expected date: 05/28/2018    Male hypogonadism  Comments:  Will do Free Test and total  Orders:  -     Testosterone, free; Future; Expected date: 05/28/2018  -     Testosterone, free; Future; Expected date: 05/28/2018    Prolactinoma  Comments:  Will get prolactin level  Orders:  -     Prolactin; Future; Expected date: 05/28/2018  -     PSA, Screening; Future; Expected date: 05/28/2018

## 2018-05-29 ENCOUNTER — LAB VISIT (OUTPATIENT)
Dept: LAB | Facility: HOSPITAL | Age: 48
End: 2018-05-29
Attending: FAMILY MEDICINE
Payer: COMMERCIAL

## 2018-05-29 DIAGNOSIS — D35.2 PROLACTINOMA: ICD-10-CM

## 2018-05-29 DIAGNOSIS — E29.1 MALE HYPOGONADISM: ICD-10-CM

## 2018-05-29 DIAGNOSIS — Z00.00 ANNUAL PHYSICAL EXAM: ICD-10-CM

## 2018-05-29 LAB
ALBUMIN SERPL BCP-MCNC: 3.4 G/DL
ALP SERPL-CCNC: 64 U/L
ALT SERPL W/O P-5'-P-CCNC: 21 U/L
ANION GAP SERPL CALC-SCNC: 7 MMOL/L
AST SERPL-CCNC: 17 U/L
BASOPHILS # BLD AUTO: 0.03 K/UL
BASOPHILS NFR BLD: 0.4 %
BILIRUB SERPL-MCNC: 0.6 MG/DL
BUN SERPL-MCNC: 11 MG/DL
CALCIUM SERPL-MCNC: 9.5 MG/DL
CHLORIDE SERPL-SCNC: 106 MMOL/L
CHOLEST SERPL-MCNC: 150 MG/DL
CHOLEST/HDLC SERPL: 3.6 {RATIO}
CO2 SERPL-SCNC: 30 MMOL/L
COMPLEXED PSA SERPL-MCNC: 0.95 NG/ML
CREAT SERPL-MCNC: 1.3 MG/DL
DIFFERENTIAL METHOD: NORMAL
EOSINOPHIL # BLD AUTO: 0.3 K/UL
EOSINOPHIL NFR BLD: 4.2 %
ERYTHROCYTE [DISTWIDTH] IN BLOOD BY AUTOMATED COUNT: 13.8 %
EST. GFR  (AFRICAN AMERICAN): >60 ML/MIN/1.73 M^2
EST. GFR  (NON AFRICAN AMERICAN): >60 ML/MIN/1.73 M^2
GLUCOSE SERPL-MCNC: 91 MG/DL
HCT VFR BLD AUTO: 45.3 %
HDLC SERPL-MCNC: 42 MG/DL
HDLC SERPL: 28 %
HGB BLD-MCNC: 14.9 G/DL
IMM GRANULOCYTES # BLD AUTO: 0.03 K/UL
IMM GRANULOCYTES NFR BLD AUTO: 0.4 %
LDLC SERPL CALC-MCNC: 92.6 MG/DL
LYMPHOCYTES # BLD AUTO: 1.9 K/UL
LYMPHOCYTES NFR BLD: 24.5 %
MCH RBC QN AUTO: 30.3 PG
MCHC RBC AUTO-ENTMCNC: 32.9 G/DL
MCV RBC AUTO: 92 FL
MONOCYTES # BLD AUTO: 0.5 K/UL
MONOCYTES NFR BLD: 6.8 %
NEUTROPHILS # BLD AUTO: 4.8 K/UL
NEUTROPHILS NFR BLD: 63.7 %
NONHDLC SERPL-MCNC: 108 MG/DL
NRBC BLD-RTO: 0 /100 WBC
PLATELET # BLD AUTO: 235 K/UL
PMV BLD AUTO: 10.7 FL
POTASSIUM SERPL-SCNC: 4.4 MMOL/L
PROLACTIN SERPL IA-MCNC: 2.4 NG/ML
PROT SERPL-MCNC: 6.7 G/DL
RBC # BLD AUTO: 4.92 M/UL
SODIUM SERPL-SCNC: 143 MMOL/L
TRIGL SERPL-MCNC: 77 MG/DL
WBC # BLD AUTO: 7.55 K/UL

## 2018-05-29 PROCEDURE — 36415 COLL VENOUS BLD VENIPUNCTURE: CPT | Mod: PO

## 2018-05-29 PROCEDURE — 84402 ASSAY OF FREE TESTOSTERONE: CPT

## 2018-05-29 PROCEDURE — 84146 ASSAY OF PROLACTIN: CPT

## 2018-05-29 PROCEDURE — 84153 ASSAY OF PSA TOTAL: CPT

## 2018-05-29 PROCEDURE — 80061 LIPID PANEL: CPT

## 2018-05-29 PROCEDURE — 80053 COMPREHEN METABOLIC PANEL: CPT

## 2018-05-29 PROCEDURE — 85025 COMPLETE CBC W/AUTO DIFF WBC: CPT

## 2018-05-31 LAB
TESTOST FREE SERPL-MCNC: 12.5 PG/ML
TESTOST FREE SERPL-MCNC: 12.5 PG/ML

## 2018-06-12 ENCOUNTER — OFFICE VISIT (OUTPATIENT)
Dept: UROLOGY | Facility: CLINIC | Age: 48
End: 2018-06-12
Payer: COMMERCIAL

## 2018-06-12 VITALS
HEIGHT: 69 IN | SYSTOLIC BLOOD PRESSURE: 124 MMHG | DIASTOLIC BLOOD PRESSURE: 86 MMHG | WEIGHT: 281.75 LBS | BODY MASS INDEX: 41.73 KG/M2

## 2018-06-12 DIAGNOSIS — D35.2 BENIGN NEOPLASM OF PITUITARY GLAND AND CRANIOPHARYNGEAL DUCT (POUCH): ICD-10-CM

## 2018-06-12 DIAGNOSIS — D35.2 PROLACTINOMA: Primary | ICD-10-CM

## 2018-06-12 DIAGNOSIS — D35.3 BENIGN NEOPLASM OF PITUITARY GLAND AND CRANIOPHARYNGEAL DUCT (POUCH): ICD-10-CM

## 2018-06-12 DIAGNOSIS — E29.1 HYPOGONADISM IN MALE: ICD-10-CM

## 2018-06-12 DIAGNOSIS — Z12.5 PROSTATE CANCER SCREENING: ICD-10-CM

## 2018-06-12 DIAGNOSIS — N40.0 BENIGN PROSTATIC HYPERPLASIA, UNSPECIFIED WHETHER LOWER URINARY TRACT SYMPTOMS PRESENT: ICD-10-CM

## 2018-06-12 LAB
BILIRUB SERPL-MCNC: NORMAL MG/DL
BLOOD URINE, POC: NORMAL
COLOR, POC UA: YELLOW
GLUCOSE UR QL STRIP: NORMAL
KETONES UR QL STRIP: NORMAL
LEUKOCYTE ESTERASE URINE, POC: NORMAL
NITRITE, POC UA: NORMAL
PH, POC UA: 5
PROTEIN, POC: NORMAL
SPECIFIC GRAVITY, POC UA: 1.01
UROBILINOGEN, POC UA: NORMAL

## 2018-06-12 PROCEDURE — 81002 URINALYSIS NONAUTO W/O SCOPE: CPT | Mod: S$GLB,,, | Performed by: UROLOGY

## 2018-06-12 PROCEDURE — 3008F BODY MASS INDEX DOCD: CPT | Mod: CPTII,S$GLB,, | Performed by: UROLOGY

## 2018-06-12 PROCEDURE — 3079F DIAST BP 80-89 MM HG: CPT | Mod: CPTII,S$GLB,, | Performed by: UROLOGY

## 2018-06-12 PROCEDURE — 99214 OFFICE O/P EST MOD 30 MIN: CPT | Mod: 25,S$GLB,, | Performed by: UROLOGY

## 2018-06-12 PROCEDURE — 99999 PR PBB SHADOW E&M-EST. PATIENT-LVL II: CPT | Mod: PBBFAC,,, | Performed by: UROLOGY

## 2018-06-12 PROCEDURE — 3074F SYST BP LT 130 MM HG: CPT | Mod: CPTII,S$GLB,, | Performed by: UROLOGY

## 2018-06-12 RX ORDER — CABERGOLINE 0.5 MG/1
TABLET ORAL
Qty: 48 TABLET | Refills: 11 | Status: SHIPPED | OUTPATIENT
Start: 2018-06-12 | End: 2018-12-19 | Stop reason: SDUPTHER

## 2018-06-12 RX ORDER — TAMSULOSIN HYDROCHLORIDE 0.4 MG/1
0.4 CAPSULE ORAL DAILY
Qty: 30 CAPSULE | Refills: 11 | Status: SHIPPED | OUTPATIENT
Start: 2018-06-12 | End: 2018-12-19 | Stop reason: SDUPTHER

## 2018-06-12 RX ORDER — TESTOSTERONE CYPIONATE 200 MG/ML
INJECTION, SOLUTION INTRAMUSCULAR
Qty: 10 ML | Refills: 1 | Status: SHIPPED | OUTPATIENT
Start: 2018-06-12 | End: 2018-06-12 | Stop reason: SDUPTHER

## 2018-06-12 RX ORDER — TESTOSTERONE CYPIONATE 200 MG/ML
INJECTION, SOLUTION INTRAMUSCULAR
Qty: 10 ML | Refills: 1 | Status: SHIPPED | OUTPATIENT
Start: 2018-06-12 | End: 2019-01-02

## 2018-06-12 NOTE — PROGRESS NOTES
"Chief Complaint: Nocturia    HPI:   6/12/18: Prolactin approp, T taken monthly.  Constipation managed with better activity.  Voiding okay on flomax.  8/21/17: Labs not back yet.  No new problems.  Does have some LUTS when he gets constipated; reviewed in detail rx for miralax.  2/16/17: Prolactin normal on cabergoline, T is low till.  Voiding fine no new problems.    8/3/16: No abd/pelvic pain and no exac/rel factors.  No hematuria.  No urolithiasis.  No urinary bother.  No  history.  Normal sexual function.  Recent prolactin very high; free T mildly low.  Referred by Dr. Jimenez. Had stopped cabergoline sometime 11/15 but has recently restarted it.  Was off flomax and having nocturia x8 after 3-4 in the morning; trouble getting all the way empty lots of double voiding. Has restarted flomax and now the nocturia has improved.  Has not been on T in a year and a half and hasn't missed it.  Drinks cokes some days.  Constipation is a problem.  10/12: Shraddha: "Grant Rodriguez is a 42 y.o. male with a history of prolactinoma.  He is currently on cabergoline for this.  He is also on TRT via IM injections.  He is here to discuss possible testopel.  He also has some LUTS.  He has nocturia x 3, decreased FOS, + straining.  He denies urgency.  He would like to try an alpha blocker. He denies ED.  -> was given flomax; did not return for testopel.    Allergies:  Patient has no known allergies.    Medications:  has a current medication list which includes the following prescription(s): cabergoline, tamsulosin, and testosterone cypionate.    Review of Systems:  General: No fever, chills, fatigability, or weight loss.  Skin: No rashes, itching, or changes in color or texture of skin.  Chest: Denies LR, cyanosis, wheezing, cough, and sputum production.  Abdomen: Appetite fine. No weight loss. Denies diarrhea, abdominal pain, hematemesis, or blood in stool.  Musculoskeletal: No joint stiffness or swelling. Denies back pain.  : As " above.  All other review of systems negative.    PMH:   has a past medical history of Hypogonadism, male; IGT (impaired glucose tolerance); Obesity; and Prolactinoma.    PSH:   has no past surgical history on file.    FamHx: family history includes Diabetes in his father; Hypertension in his father.    SocHx:  reports that he has been smoking.  He has a 20.00 pack-year smoking history. He does not have any smokeless tobacco history on file. He reports that he does not drink alcohol or use drugs.      Physical Exam:  Vitals:    06/12/18 1625   BP: 124/86     General: A&Ox3, no apparent distress, no deformities  Neck: No masses, normal thyroid  Lungs: normal inspiration, no use of accessory muscles  Heart: normal pulse, no arrhythmias  Abdomen: Soft, NT, ND  Skin: The skin is warm and dry. No jaundice.  Ext: No c/c/e.  :   6/12/18: Test desc alethea, no abnormalities of epididymus. Penis normal, with normal penile and scrotal skin. Meatus normal. Normal rectal tone, no hemorrhoids. Prost 30 gm no nodules or masses appreciated. SV not palpable. Perineum and anus normal.    Labs/Studies:   Urinalysis performed in clinic, summary: UA normal  Bladder Scan performed in office:     8/3/16: PVR 15 ml.  PSA    7/16: 0.5    Impression/Plan:   Same plan:   1. Flomax is doing the job for LUTS; PSA was low.  Ordering labs today.  Hct/LFT/Prolactin in 6 mo.  EMILIANO next visit.  2. Cabergoline should not be stopped.  Elevated prolactin shifts FSH/LH and T will improve on this regimen.    3. T 200mg q3wks, RTC 6 mo with labs.  4. Miralax for constipation

## 2018-08-03 ENCOUNTER — PATIENT MESSAGE (OUTPATIENT)
Dept: UROLOGY | Facility: CLINIC | Age: 48
End: 2018-08-03

## 2018-08-06 ENCOUNTER — PATIENT MESSAGE (OUTPATIENT)
Dept: UROLOGY | Facility: CLINIC | Age: 48
End: 2018-08-06

## 2018-08-06 RX ORDER — SILDENAFIL CITRATE 20 MG/1
TABLET ORAL
Qty: 50 TABLET | Refills: 11 | Status: SHIPPED | OUTPATIENT
Start: 2018-08-06 | End: 2019-08-05 | Stop reason: SDUPTHER

## 2018-12-19 ENCOUNTER — OFFICE VISIT (OUTPATIENT)
Dept: UROLOGY | Facility: CLINIC | Age: 48
End: 2018-12-19
Payer: MEDICAID

## 2018-12-19 ENCOUNTER — LAB VISIT (OUTPATIENT)
Dept: LAB | Facility: HOSPITAL | Age: 48
End: 2018-12-19
Attending: UROLOGY
Payer: MEDICAID

## 2018-12-19 VITALS
DIASTOLIC BLOOD PRESSURE: 92 MMHG | HEIGHT: 69 IN | HEART RATE: 91 BPM | SYSTOLIC BLOOD PRESSURE: 146 MMHG | WEIGHT: 271.19 LBS | BODY MASS INDEX: 40.17 KG/M2

## 2018-12-19 DIAGNOSIS — Z12.5 PROSTATE CANCER SCREENING: ICD-10-CM

## 2018-12-19 DIAGNOSIS — D35.2 BENIGN NEOPLASM OF PITUITARY GLAND AND CRANIOPHARYNGEAL DUCT (POUCH): ICD-10-CM

## 2018-12-19 DIAGNOSIS — D35.2 PROLACTINOMA: ICD-10-CM

## 2018-12-19 DIAGNOSIS — D35.2 PROLACTINOMA: Primary | ICD-10-CM

## 2018-12-19 DIAGNOSIS — D35.3 BENIGN NEOPLASM OF PITUITARY GLAND AND CRANIOPHARYNGEAL DUCT (POUCH): ICD-10-CM

## 2018-12-19 LAB
BILIRUB SERPL-MCNC: NORMAL MG/DL
BLOOD URINE, POC: NORMAL
COLOR, POC UA: YELLOW
COMPLEXED PSA SERPL-MCNC: 0.74 NG/ML
GLUCOSE UR QL STRIP: NORMAL
KETONES UR QL STRIP: NORMAL
LEUKOCYTE ESTERASE URINE, POC: NORMAL
NITRITE, POC UA: NORMAL
PH, POC UA: 6
PROTEIN, POC: NORMAL
SPECIFIC GRAVITY, POC UA: 1.01
UROBILINOGEN, POC UA: NORMAL

## 2018-12-19 PROCEDURE — 99212 OFFICE O/P EST SF 10 MIN: CPT | Mod: PBBFAC | Performed by: UROLOGY

## 2018-12-19 PROCEDURE — 36415 COLL VENOUS BLD VENIPUNCTURE: CPT

## 2018-12-19 PROCEDURE — 99999 PR PBB SHADOW E&M-EST. PATIENT-LVL II: CPT | Mod: PBBFAC,,, | Performed by: UROLOGY

## 2018-12-19 PROCEDURE — 84153 ASSAY OF PSA TOTAL: CPT

## 2018-12-19 PROCEDURE — 99214 OFFICE O/P EST MOD 30 MIN: CPT | Mod: S$PBB,,, | Performed by: UROLOGY

## 2018-12-19 PROCEDURE — 81002 URINALYSIS NONAUTO W/O SCOPE: CPT | Mod: PBBFAC | Performed by: UROLOGY

## 2018-12-19 RX ORDER — CABERGOLINE 0.5 MG/1
TABLET ORAL
Qty: 48 TABLET | Refills: 11 | Status: SHIPPED | OUTPATIENT
Start: 2018-12-19 | End: 2020-01-16

## 2018-12-19 RX ORDER — TAMSULOSIN HYDROCHLORIDE 0.4 MG/1
0.4 CAPSULE ORAL DAILY
Qty: 30 CAPSULE | Refills: 11 | Status: SHIPPED | OUTPATIENT
Start: 2018-12-19 | End: 2019-08-05

## 2018-12-19 NOTE — PROGRESS NOTES
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  Labs not done.  Miralax working.  Taking cabergoline.  Reviewed history in detail.  6/12/18: Prolactin approp, T taken monthly.  Constipation managed with better activity.  Voiding okay on flomax.  8/21/17: Labs not back yet.  No new problems.  Does have some LUTS when he gets constipated; reviewed in detail rx for miralax.  2/16/17: Prolactin normal on cabergoline, T is low till.  Voiding fine no new problems.    8/3/16: No abd/pelvic pain and no exac/rel factors.  No hematuria.  No urolithiasis.  No urinary bother.  No  history.  Normal sexual function.  Recent prolactin very high; free T mildly low.  Referred by Dr. Jimenez. Had stopped cabergoline sometime 11/15 but has recently restarted it.  Was off flomax and having nocturia x8 after 3-4 in the morning; trouble getting all the way empty lots of double voiding. Has restarted flomax and now the nocturia has improved.  Has not been on T in a year and a half and hasn't missed it.  Drinks cokes some days.  Constipation is a problem.  10/12: Shraddha: "Grant Rodriguez is a 42 y.o. male with a history of prolactinoma.  He is currently on cabergoline for this.  He is also on TRT via IM injections.  He is here to discuss possible testopel.  He also has some LUTS.  He has nocturia x 3, decreased FOS, + straining.  He denies urgency.  He would like to try an alpha blocker. He denies ED.  -> was given flomax; did not return for testopel.    Allergies:  Patient has no known allergies.    Medications:  has a current medication list which includes the following prescription(s): cabergoline, sildenafil, tamsulosin, and testosterone cypionate.    Review of Systems:  General: No fever, chills, fatigability, or weight loss.  Skin: No rashes, itching, or changes in color or texture of skin.  Chest: Denies LR, cyanosis, wheezing, cough, and sputum production.  Abdomen: Appetite fine. No weight loss. Denies diarrhea, " abdominal pain, hematemesis, or blood in stool.  Musculoskeletal: No joint stiffness or swelling. Denies back pain.  : As above.  All other review of systems negative.    PMH:   has a past medical history of Hypogonadism, male, IGT (impaired glucose tolerance), Obesity, and Prolactinoma.    PSH:   has no past surgical history on file.    FamHx: family history includes Diabetes in his father; Hypertension in his father.    SocHx:  reports that he has been smoking.  He has a 20.00 pack-year smoking history. He does not have any smokeless tobacco history on file. He reports that he does not drink alcohol or use drugs.      Physical Exam:  Vitals:    12/19/18 1306   BP: (!) 146/92   Pulse: 91     General: A&Ox3, no apparent distress, no deformities  Neck: No masses, normal thyroid  Lungs: normal inspiration, no use of accessory muscles  Heart: normal pulse, no arrhythmias  Abdomen: Soft, NT, ND  Skin: The skin is warm and dry. No jaundice.  Ext: No c/c/e.  :   6/12/18: Test desc alethea, no abnormalities of epididymus. Penis normal, with normal penile and scrotal skin. Meatus normal. Normal rectal tone, no hemorrhoids. Prost 30 gm no nodules or masses appreciated. SV not palpable. Perineum and anus normal.    Labs/Studies:   Urinalysis performed in clinic, summary: UA normal  Bladder Scan performed in office:     8/3/16: PVR 15 ml.  PSA    7/16: 0.5    Impression/Plan:   Same plan:   1. Flomax is doing the job for LUTS; PSA was low.  Ordering labs today.  Hct/LFT/Prolactin in 6 mo.  2. Cabergoline should not be stopped.  Elevated prolactin shifts FSH/LH and T will improve on this regimen.    3. T 200mg q3wks, RTC 6 mo with labs.  4. Miralax for constipation

## 2019-01-02 ENCOUNTER — TELEPHONE (OUTPATIENT)
Dept: PHARMACY | Facility: CLINIC | Age: 49
End: 2019-01-02

## 2019-01-02 RX ORDER — TESTOSTERONE CYPIONATE 200 MG/ML
INJECTION, SOLUTION INTRAMUSCULAR
Qty: 10 ML | Refills: 1 | Status: SHIPPED | OUTPATIENT
Start: 2019-01-02 | End: 2019-07-08

## 2019-01-02 NOTE — TELEPHONE ENCOUNTER
Told patient PA submitted to "[employer]" on 1/2/18 @ 10:10am for Cabergoline 0.5mg and can take 72 hours for approval.    Patient verbailzed understanding and was thankful for the call.    Will notify patient and provider upon approval / denial.    Thanks,   Kim Parada CPhT, B.A  Patient Care Advocate   [facility]  Phone: [phone] Ext [phone]  Fax: [phone]

## 2019-01-02 NOTE — TELEPHONE ENCOUNTER
Called and spoke with patient notifying him Cabergoline 0.5 mg PA approved resulting in a $3.00 copayment.    Patient will .    PA Information:  [employer]  [phone]  PA Approval Dates: 1/2/19-1/2/2020  PA Approved for Cabergoline 0.5mg # 48 per 120 days    Thanks,   Kim Parada CPhT, B.A  Patient Care Advocate   [facility]  Phone: [phone] Ext [phone]  Fax: [phone]

## 2019-04-05 DIAGNOSIS — Z12.11 COLON CANCER SCREENING: ICD-10-CM

## 2019-05-09 ENCOUNTER — TELEPHONE (OUTPATIENT)
Dept: PHARMACY | Facility: CLINIC | Age: 49
End: 2019-05-09

## 2019-05-09 NOTE — TELEPHONE ENCOUNTER
Reason for call:    Notify patient PA for Testosterone Cypionate was approved resulting in a $0.00 copayment.    PA Information:  [employer]  [phone]  PA Approval Dates: 5/9/19-11/9/19  PA Approved for Testosterone Cypionate Solution 200 mg/mL 1 mL per 21 days    Thank You,   Kim Parada  Patient Care Advocate   [facility]  Phone: [phone]  Fax: [phone]

## 2019-06-19 ENCOUNTER — PATIENT MESSAGE (OUTPATIENT)
Dept: UROLOGY | Facility: CLINIC | Age: 49
End: 2019-06-19

## 2019-06-19 NOTE — TELEPHONE ENCOUNTER
Good Afternoon Mr. Rodriguez,   We can reschedule your lab visit to have blood drawn, no problem. Would you like me to reschedule you at the [place] lab seeing that you are working that way? If yes, which day would work for you? Our lab visits are first come first serve and they close at 5:00pm, so as long as you can make it there before then you should be able to be seen. Hope your day is going well.   Nurse Joseline  ===View-only below this line===            ----- Message -----     From: Grant Rodriguez     Sent: 6/19/2019  3:41 PM CDT       To: García Mejia IV, MD  Subject: Non-Urgent Medical    Doctor Jackie,    I am just seeing the email regarding the questionnaire that was sent to me a couple of days ago. Also, I was unaware of my scheduled appointment for today, 6/19/2019. If someone could reschedule the appointment for the afternoon, it would greatly appreciated. Presently I am working in [place] and late afternoons would work better. Keep me posted.     Regards,    Grant Baughr

## 2019-06-20 ENCOUNTER — PATIENT MESSAGE (OUTPATIENT)
Dept: UROLOGY | Facility: CLINIC | Age: 49
End: 2019-06-20

## 2019-06-24 ENCOUNTER — LAB VISIT (OUTPATIENT)
Dept: LAB | Facility: HOSPITAL | Age: 49
End: 2019-06-24
Attending: UROLOGY
Payer: MEDICAID

## 2019-06-24 DIAGNOSIS — Z12.5 PROSTATE CANCER SCREENING: ICD-10-CM

## 2019-06-24 DIAGNOSIS — D35.2 PROLACTINOMA: ICD-10-CM

## 2019-06-24 LAB
ALBUMIN SERPL BCP-MCNC: 3.5 G/DL (ref 3.5–5.2)
ALP SERPL-CCNC: 74 U/L (ref 55–135)
ALT SERPL W/O P-5'-P-CCNC: 16 U/L (ref 10–44)
AST SERPL-CCNC: 15 U/L (ref 10–40)
BILIRUB DIRECT SERPL-MCNC: 0.2 MG/DL (ref 0.1–0.3)
BILIRUB SERPL-MCNC: 0.3 MG/DL (ref 0.1–1)
HCT VFR BLD AUTO: 48.5 % (ref 40–54)
PROLACTIN SERPL IA-MCNC: 1.9 NG/ML (ref 3.5–19.4)
PROT SERPL-MCNC: 7.3 G/DL (ref 6–8.4)

## 2019-06-24 PROCEDURE — 85014 HEMATOCRIT: CPT

## 2019-06-24 PROCEDURE — 36415 COLL VENOUS BLD VENIPUNCTURE: CPT

## 2019-06-24 PROCEDURE — 84146 ASSAY OF PROLACTIN: CPT

## 2019-06-24 PROCEDURE — 80076 HEPATIC FUNCTION PANEL: CPT

## 2019-07-08 RX ORDER — TESTOSTERONE CYPIONATE 200 MG/ML
INJECTION, SOLUTION INTRAMUSCULAR
Qty: 10 ML | Refills: 1 | Status: SHIPPED | OUTPATIENT
Start: 2019-07-08 | End: 2019-08-05 | Stop reason: SDUPTHER

## 2019-07-30 ENCOUNTER — PATIENT OUTREACH (OUTPATIENT)
Dept: ADMINISTRATIVE | Facility: HOSPITAL | Age: 49
End: 2019-07-30

## 2019-08-01 ENCOUNTER — PATIENT OUTREACH (OUTPATIENT)
Dept: ADMINISTRATIVE | Facility: HOSPITAL | Age: 49
End: 2019-08-01

## 2019-08-01 NOTE — PROGRESS NOTES
Contacted patient to schedule annual pcp appointment. Patient has an appointment scheduled on 12/02/19

## 2019-08-05 ENCOUNTER — OFFICE VISIT (OUTPATIENT)
Dept: UROLOGY | Facility: CLINIC | Age: 49
End: 2019-08-05
Payer: COMMERCIAL

## 2019-08-05 VITALS — BODY MASS INDEX: 40.02 KG/M2 | WEIGHT: 271 LBS

## 2019-08-05 DIAGNOSIS — E22.1 HYPERPROLACTINEMIA: Primary | ICD-10-CM

## 2019-08-05 DIAGNOSIS — E29.1 HYPOGONADISM IN MALE: ICD-10-CM

## 2019-08-05 LAB
BILIRUB SERPL-MCNC: NORMAL MG/DL
BLOOD URINE, POC: NORMAL
COLOR, POC UA: YELLOW
GLUCOSE UR QL STRIP: NORMAL
KETONES UR QL STRIP: NORMAL
LEUKOCYTE ESTERASE URINE, POC: NORMAL
NITRITE, POC UA: NORMAL
PH, POC UA: 7
PROTEIN, POC: NORMAL
SPECIFIC GRAVITY, POC UA: 1
UROBILINOGEN, POC UA: NORMAL

## 2019-08-05 PROCEDURE — 3008F PR BODY MASS INDEX (BMI) DOCUMENTED: ICD-10-PCS | Mod: CPTII,S$GLB,, | Performed by: UROLOGY

## 2019-08-05 PROCEDURE — 99999 PR PBB SHADOW E&M-EST. PATIENT-LVL II: ICD-10-PCS | Mod: PBBFAC,,, | Performed by: UROLOGY

## 2019-08-05 PROCEDURE — 81002 POCT URINE DIPSTICK WITHOUT MICROSCOPE: ICD-10-PCS | Mod: S$GLB,,, | Performed by: UROLOGY

## 2019-08-05 PROCEDURE — 99999 PR PBB SHADOW E&M-EST. PATIENT-LVL II: CPT | Mod: PBBFAC,,, | Performed by: UROLOGY

## 2019-08-05 PROCEDURE — 99214 OFFICE O/P EST MOD 30 MIN: CPT | Mod: 25,S$GLB,, | Performed by: UROLOGY

## 2019-08-05 PROCEDURE — 81002 URINALYSIS NONAUTO W/O SCOPE: CPT | Mod: S$GLB,,, | Performed by: UROLOGY

## 2019-08-05 PROCEDURE — 99214 PR OFFICE/OUTPT VISIT, EST, LEVL IV, 30-39 MIN: ICD-10-PCS | Mod: 25,S$GLB,, | Performed by: UROLOGY

## 2019-08-05 PROCEDURE — 3008F BODY MASS INDEX DOCD: CPT | Mod: CPTII,S$GLB,, | Performed by: UROLOGY

## 2019-08-05 RX ORDER — SILDENAFIL CITRATE 20 MG/1
TABLET ORAL
Qty: 50 TABLET | Refills: 11 | Status: SHIPPED | OUTPATIENT
Start: 2019-08-05 | End: 2020-02-10 | Stop reason: SDUPTHER

## 2019-08-05 RX ORDER — TESTOSTERONE CYPIONATE 200 MG/ML
INJECTION, SOLUTION INTRAMUSCULAR
Qty: 10 ML | Refills: 1 | Status: SHIPPED | OUTPATIENT
Start: 2019-08-05 | End: 2020-02-10 | Stop reason: SDUPTHER

## 2019-08-05 NOTE — PROGRESS NOTES
"Chief Complaint: Nocturia    HPI:   8/5/19: No problems feeling fine.  Labs approp, cabergoline going well.  Miralax helps.  Still having constipation once a week.  Not needing flomax.  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  Labs not done.  Miralax working.  Taking cabergoline.  Reviewed history in detail.  6/12/18: Prolactin approp, T taken monthly.  Constipation managed with better activity.  Voiding okay on flomax.  8/21/17: Labs not back yet.  No new problems.  Does have some LUTS when he gets constipated; reviewed in detail rx for miralax.  2/16/17: Prolactin normal on cabergoline, T is low till.  Voiding fine no new problems.    8/3/16: No abd/pelvic pain and no exac/rel factors.  No hematuria.  No urolithiasis.  No urinary bother.  No  history.  Normal sexual function.  Recent prolactin very high; free T mildly low.  Referred by Dr. Jimenez. Had stopped cabergoline sometime 11/15 but has recently restarted it.  Was off flomax and having nocturia x8 after 3-4 in the morning; trouble getting all the way empty lots of double voiding. Has restarted flomax and now the nocturia has improved.  Has not been on T in a year and a half and hasn't missed it.  Drinks cokes some days.  Constipation is a problem.  10/12: Shraddha: "Gratn Rodriguez is a 42 y.o. male with a history of prolactinoma.  He is currently on cabergoline for this.  He is also on TRT via IM injections.  He is here to discuss possible testopel.  He also has some LUTS.  He has nocturia x 3, decreased FOS, + straining.  He denies urgency.  He would like to try an alpha blocker. He denies ED.  -> was given flomax; did not return for testopel.    Allergies:  Patient has no known allergies.    Medications:  has a current medication list which includes the following prescription(s): cabergoline, sildenafil, tamsulosin, and testosterone cypionate.    Review of Systems:  General: No fever, chills, fatigability, or weight loss.  Skin: No rashes, itching, or changes in color " or texture of skin.  Chest: Denies LR, cyanosis, wheezing, cough, and sputum production.  Abdomen: Appetite fine. No weight loss. Denies diarrhea, abdominal pain, hematemesis, or blood in stool.  Musculoskeletal: No joint stiffness or swelling. Denies back pain.  : As above.  All other review of systems negative.    PMH:   has a past medical history of Hypogonadism, male, IGT (impaired glucose tolerance), Obesity, and Prolactinoma.    PSH:   has no past surgical history on file.    FamHx: family history includes Diabetes in his father; Hypertension in his father.    SocHx:  reports that he has been smoking.  He has a 20.00 pack-year smoking history. He does not have any smokeless tobacco history on file. He reports that he does not drink alcohol or use drugs.      Physical Exam:  There were no vitals filed for this visit.  General: A&Ox3, no apparent distress, no deformities  Neck: No masses, normal thyroid  Lungs: normal inspiration, no use of accessory muscles  Heart: normal pulse, no arrhythmias  Abdomen: Soft, NT, ND  Skin: The skin is warm and dry. No jaundice.  Ext: No c/c/e.  :   6/12/18: Test desc alethea, no abnormalities of epididymus. Penis normal, with normal penile and scrotal skin. Meatus normal. Normal rectal tone, no hemorrhoids. Prost 30 gm no nodules or masses appreciated. SV not palpable. Perineum and anus normal.    Labs/Studies:   Urinalysis performed in clinic, summary: UA normal  Bladder Scan performed in office:     8/3/16: PVR 15 ml.  PSA    7/16: 0.5    Impression/Plan:   Same plan:   1. Flomax is doing the job for LUTS; PSA was low.  Ordering labs today.  Hct/LFT/Prolactin in 6 mo.  2. Cabergoline should not be stopped.  Elevated prolactin shifts FSH/LH and T will improve on this regimen.    3. T 200mg q3wks, RTC 6 mo with labs.

## 2019-09-27 ENCOUNTER — PATIENT MESSAGE (OUTPATIENT)
Dept: INTERNAL MEDICINE | Facility: CLINIC | Age: 49
End: 2019-09-27

## 2019-12-02 ENCOUNTER — LAB VISIT (OUTPATIENT)
Dept: LAB | Facility: HOSPITAL | Age: 49
End: 2019-12-02
Attending: FAMILY MEDICINE
Payer: COMMERCIAL

## 2019-12-02 ENCOUNTER — OFFICE VISIT (OUTPATIENT)
Dept: INTERNAL MEDICINE | Facility: CLINIC | Age: 49
End: 2019-12-02
Payer: COMMERCIAL

## 2019-12-02 ENCOUNTER — TELEPHONE (OUTPATIENT)
Dept: PULMONOLOGY | Facility: HOSPITAL | Age: 49
End: 2019-12-02

## 2019-12-02 VITALS
SYSTOLIC BLOOD PRESSURE: 162 MMHG | WEIGHT: 260.56 LBS | HEART RATE: 65 BPM | TEMPERATURE: 97 F | HEIGHT: 69 IN | BODY MASS INDEX: 38.59 KG/M2 | OXYGEN SATURATION: 98 % | DIASTOLIC BLOOD PRESSURE: 112 MMHG

## 2019-12-02 DIAGNOSIS — G47.30 SLEEP APNEA, UNSPECIFIED TYPE: ICD-10-CM

## 2019-12-02 DIAGNOSIS — Z00.00 ANNUAL PHYSICAL EXAM: Primary | ICD-10-CM

## 2019-12-02 DIAGNOSIS — Z00.00 ANNUAL PHYSICAL EXAM: ICD-10-CM

## 2019-12-02 LAB
ALBUMIN SERPL BCP-MCNC: 3.6 G/DL (ref 3.5–5.2)
ALP SERPL-CCNC: 68 U/L (ref 55–135)
ALT SERPL W/O P-5'-P-CCNC: 17 U/L (ref 10–44)
ANION GAP SERPL CALC-SCNC: 9 MMOL/L (ref 8–16)
AST SERPL-CCNC: 17 U/L (ref 10–40)
BASOPHILS # BLD AUTO: 0.05 K/UL (ref 0–0.2)
BASOPHILS NFR BLD: 0.8 % (ref 0–1.9)
BILIRUB SERPL-MCNC: 0.8 MG/DL (ref 0.1–1)
BUN SERPL-MCNC: 19 MG/DL (ref 6–20)
CALCIUM SERPL-MCNC: 9.4 MG/DL (ref 8.7–10.5)
CHLORIDE SERPL-SCNC: 104 MMOL/L (ref 95–110)
CHOLEST SERPL-MCNC: 173 MG/DL (ref 120–199)
CHOLEST/HDLC SERPL: 3.1 {RATIO} (ref 2–5)
CO2 SERPL-SCNC: 27 MMOL/L (ref 23–29)
CREAT SERPL-MCNC: 1.4 MG/DL (ref 0.5–1.4)
DIFFERENTIAL METHOD: NORMAL
EOSINOPHIL # BLD AUTO: 0.4 K/UL (ref 0–0.5)
EOSINOPHIL NFR BLD: 6.5 % (ref 0–8)
ERYTHROCYTE [DISTWIDTH] IN BLOOD BY AUTOMATED COUNT: 14.2 % (ref 11.5–14.5)
EST. GFR  (AFRICAN AMERICAN): >60 ML/MIN/1.73 M^2
EST. GFR  (NON AFRICAN AMERICAN): 58.2 ML/MIN/1.73 M^2
GLUCOSE SERPL-MCNC: 89 MG/DL (ref 70–110)
HCT VFR BLD AUTO: 51.3 % (ref 40–54)
HDLC SERPL-MCNC: 55 MG/DL (ref 40–75)
HDLC SERPL: 31.8 % (ref 20–50)
HGB BLD-MCNC: 16.6 G/DL (ref 14–18)
IMM GRANULOCYTES # BLD AUTO: 0.02 K/UL (ref 0–0.04)
IMM GRANULOCYTES NFR BLD AUTO: 0.3 % (ref 0–0.5)
LDLC SERPL CALC-MCNC: 97.2 MG/DL (ref 63–159)
LYMPHOCYTES # BLD AUTO: 1.8 K/UL (ref 1–4.8)
LYMPHOCYTES NFR BLD: 28.7 % (ref 18–48)
MCH RBC QN AUTO: 30.5 PG (ref 27–31)
MCHC RBC AUTO-ENTMCNC: 32.4 G/DL (ref 32–36)
MCV RBC AUTO: 94 FL (ref 82–98)
MONOCYTES # BLD AUTO: 0.4 K/UL (ref 0.3–1)
MONOCYTES NFR BLD: 6.4 % (ref 4–15)
NEUTROPHILS # BLD AUTO: 3.7 K/UL (ref 1.8–7.7)
NEUTROPHILS NFR BLD: 57.3 % (ref 38–73)
NONHDLC SERPL-MCNC: 118 MG/DL
NRBC BLD-RTO: 0 /100 WBC
PLATELET # BLD AUTO: 205 K/UL (ref 150–350)
PMV BLD AUTO: 11.2 FL (ref 9.2–12.9)
POTASSIUM SERPL-SCNC: 4.6 MMOL/L (ref 3.5–5.1)
PROT SERPL-MCNC: 7.1 G/DL (ref 6–8.4)
RBC # BLD AUTO: 5.44 M/UL (ref 4.6–6.2)
SODIUM SERPL-SCNC: 140 MMOL/L (ref 136–145)
TRIGL SERPL-MCNC: 104 MG/DL (ref 30–150)
WBC # BLD AUTO: 6.42 K/UL (ref 3.9–12.7)

## 2019-12-02 PROCEDURE — 99396 PREV VISIT EST AGE 40-64: CPT | Mod: S$GLB,,, | Performed by: FAMILY MEDICINE

## 2019-12-02 PROCEDURE — 3077F PR MOST RECENT SYSTOLIC BLOOD PRESSURE >= 140 MM HG: ICD-10-PCS | Mod: CPTII,S$GLB,, | Performed by: FAMILY MEDICINE

## 2019-12-02 PROCEDURE — 99396 PR PREVENTIVE VISIT,EST,40-64: ICD-10-PCS | Mod: S$GLB,,, | Performed by: FAMILY MEDICINE

## 2019-12-02 PROCEDURE — 80061 LIPID PANEL: CPT

## 2019-12-02 PROCEDURE — 3080F PR MOST RECENT DIASTOLIC BLOOD PRESSURE >= 90 MM HG: ICD-10-PCS | Mod: CPTII,S$GLB,, | Performed by: FAMILY MEDICINE

## 2019-12-02 PROCEDURE — 80053 COMPREHEN METABOLIC PANEL: CPT

## 2019-12-02 PROCEDURE — 36415 COLL VENOUS BLD VENIPUNCTURE: CPT

## 2019-12-02 PROCEDURE — 85025 COMPLETE CBC W/AUTO DIFF WBC: CPT

## 2019-12-02 PROCEDURE — 99999 PR PBB SHADOW E&M-EST. PATIENT-LVL IV: CPT | Mod: PBBFAC,,, | Performed by: FAMILY MEDICINE

## 2019-12-02 PROCEDURE — 3077F SYST BP >= 140 MM HG: CPT | Mod: CPTII,S$GLB,, | Performed by: FAMILY MEDICINE

## 2019-12-02 PROCEDURE — 99999 PR PBB SHADOW E&M-EST. PATIENT-LVL IV: ICD-10-PCS | Mod: PBBFAC,,, | Performed by: FAMILY MEDICINE

## 2019-12-02 PROCEDURE — 3080F DIAST BP >= 90 MM HG: CPT | Mod: CPTII,S$GLB,, | Performed by: FAMILY MEDICINE

## 2019-12-02 RX ORDER — HYDRALAZINE HYDROCHLORIDE 25 MG/1
25 TABLET, FILM COATED ORAL 3 TIMES DAILY
Qty: 90 TABLET | Refills: 11 | Status: SHIPPED | OUTPATIENT
Start: 2019-12-02 | End: 2021-01-28 | Stop reason: SDUPTHER

## 2019-12-02 RX ORDER — CYCLOBENZAPRINE HCL 10 MG
10 TABLET ORAL NIGHTLY
Qty: 30 TABLET | Refills: 0 | Status: SHIPPED | OUTPATIENT
Start: 2019-12-02 | End: 2019-12-30 | Stop reason: CLARIF

## 2019-12-02 NOTE — PROGRESS NOTES
Subjective:       Patient ID: Grant Rodriguez is a 49 y.o. male.    Chief Complaint: Annual Exam    Annual Exam:      Pt is a 49 year old who is here for annual exam. Pt is presenting with elevated blood pressure. Pt is also experiencing some issues with sleep and getting restful. [place] was only 4. Pt does report snoring. Pt has not troubling getting to sleep. Problem is staying asleep. Pt will wake up throughout the night.     Review of Systems   Constitutional: Negative.  Negative for activity change and unexpected weight change.   HENT: Negative for hearing loss, rhinorrhea and trouble swallowing.    Eyes: Negative for discharge and visual disturbance.   Respiratory: Negative.  Negative for chest tightness and wheezing.    Cardiovascular: Negative for chest pain and palpitations.   Gastrointestinal: Positive for constipation and vomiting. Negative for blood in stool and diarrhea.   Endocrine: Negative for polydipsia and polyuria.   Genitourinary: Positive for difficulty urinating. Negative for hematuria and urgency.   Musculoskeletal: Negative for arthralgias, joint swelling and neck pain.   Neurological: Negative.  Negative for weakness and headaches.   Psychiatric/Behavioral: Negative for confusion and dysphoric mood.       Objective:      Physical Exam   Constitutional: He is oriented to person, place, and time. He appears well-developed and well-nourished.   Cardiovascular: Normal rate and regular rhythm. Exam reveals no friction rub.   No murmur heard.  Pulmonary/Chest: Effort normal and breath sounds normal. No stridor. He has no wheezes.   Abdominal: Soft. Bowel sounds are normal. There is no tenderness. There is no rebound and no guarding.   Musculoskeletal:        Lumbar back: He exhibits pain and spasm.   Neurological: He is alert and oriented to person, place, and time.   Skin: Skin is warm and dry.   Psychiatric: He has a normal mood and affect. His behavior is normal.       Assessment:       1.  Annual physical exam    2. Sleep apnea, unspecified type        Plan:       Annual physical exam  Comments:  Will do Lipid panel and CBC, CMP  Orders:  -     CBC auto differential; Future; Expected date: 12/02/2019  -     Comprehensive metabolic panel; Future; Expected date: 12/02/2019  -     Lipid panel; Future; Expected date: 12/02/2019    Sleep apnea, unspecified type  Comments:  Will order sleep study in home and send to Pulmonary  Orders:  -     Home Sleep Studies; Future  -     Ambulatory consult to Pulmonology    Other orders  -     hydrALAZINE (APRESOLINE) 25 MG tablet; Take 1 tablet (25 mg total) by mouth 3 (three) times daily.  Dispense: 90 tablet; Refill: 11  -     cyclobenzaprine (FLEXERIL) 10 MG tablet; Take 1 tablet (10 mg total) by mouth every evening. for 10 days  Dispense: 30 tablet; Refill: 0

## 2019-12-02 NOTE — PATIENT INSTRUCTIONS
Hydralazine:     take 1 tab if your top number is greater than 150 or the bottom number is greater than 100. Repeat blood pressure 2 hours after taking medication. Can repeat a dose up to 4 times if blood pressure is still high.

## 2019-12-06 ENCOUNTER — PROCEDURE VISIT (OUTPATIENT)
Dept: SLEEP MEDICINE | Facility: CLINIC | Age: 49
End: 2019-12-06
Payer: COMMERCIAL

## 2019-12-06 DIAGNOSIS — G47.33 OSA (OBSTRUCTIVE SLEEP APNEA): Primary | ICD-10-CM

## 2019-12-06 DIAGNOSIS — G47.30 SLEEP APNEA, UNSPECIFIED TYPE: ICD-10-CM

## 2019-12-06 PROCEDURE — 95800 SLP STDY UNATTENDED: CPT | Mod: 26,,, | Performed by: INTERNAL MEDICINE

## 2019-12-06 PROCEDURE — 95800 SLP STDY UNATTENDED: CPT

## 2019-12-06 PROCEDURE — 99499 NO LOS: ICD-10-PCS | Mod: S$GLB,,, | Performed by: INTERNAL MEDICINE

## 2019-12-06 PROCEDURE — 99499 UNLISTED E&M SERVICE: CPT | Mod: S$GLB,,, | Performed by: INTERNAL MEDICINE

## 2019-12-06 PROCEDURE — 95800 PR SLEEP STUDY, UNATTENDED, RECORD HEART RATE/O2 SAT/RESP ANAL/SLEEP TIME: ICD-10-PCS | Mod: 26,,, | Performed by: INTERNAL MEDICINE

## 2019-12-06 NOTE — Clinical Note
PHYSICIAN INTERPRETATION AND COMMENTS: Findings are consistent with moderate, positional obstructivesleep apnea (JILLIAN). AHI was 20.0/hr with 4.8 hours sleep. SpO2 tayo was 87.8%. Treatment indicated with CPAP. Pleaserefer to sleep disorders clinic  for prompt evaluation and management. AutoPAP 5-20 cmwp with mask of choice.CLINICAL HISTORY: 49 year old male presented with: Snoring and fragmented sleep. 16 inch neck, BMI of 38, anEpworth sleepiness score of 6, no co-morbidities and symptoms of nocturnal snoring and witnessed apneas. Based on theclinical history, the patient has a high pre-test probability of having severe JILLIAN.SLEEP STUDY FINDINGS: Patient underwent a one night Home Sleep Test and by behavioral criteria, slept forapproximately 4.8 hours, with a sleep latency of 3 minutes and a sleep efficiency of 72.7%. Moderate sleep disorderedbreathing (AHI=20) is noted based on a 4% hypopnea desaturation criteria, predominantly in the supine position (22events/hour). The patient slep

## 2019-12-07 NOTE — PROCEDURES
Home Sleep Studies  Date/Time: 12/6/2019 8:00 AM  Performed by: Lamont Rick MD  Authorized by: Fernando Jimenez MD       PHYSICIAN INTERPRETATION AND COMMENTS: Findings are consistent with moderate, positional obstructive  sleep apnea (JILLIAN). AHI was 20.0/hr with 4.8 hours sleep. SpO2 tayo was 87.8%. Treatment indicated with CPAP. Please  refer to sleep disorders clinic  for prompt evaluation and management. AutoPAP 5-20 cmwp with mask of choice.  CLINICAL HISTORY: 49 year old male presented with: Snoring and fragmented sleep. 16 inch neck, BMI of 38, an  [place] sleepiness score of 6, no co-morbidities and symptoms of nocturnal snoring and witnessed apneas. Based on the  clinical history, the patient has a high pre-test probability of having severe JILLIAN.  SLEEP STUDY FINDINGS: Patient underwent a one night Home Sleep Test and by behavioral criteria, slept for  approximately 4.8 hours, with a sleep latency of 3 minutes and a sleep efficiency of 72.7%. Moderate sleep disordered  breathing (AHI=20) is noted based on a 4% hypopnea desaturation criteria, predominantly in the supine position (22  events/hour). The patient slept supine 88.7% of the night based on valid recording time of 4.8 hours and is 5.5 times as likely to  have apneas/hypopneas when supine. When considering more subtle measures of sleep disordered breathing, the overall  respiratory disturbance index is also moderate (RDI=38) based on a 1% hypopnea desaturation criteria with confirmation by  surrogate arousal indicators. The apneas/hypopneas are accompanied by minimal oxygen desaturation (percent time below 90%  SpO2: 0.3%, Min SpO2: 87.8%). The average desaturation across all sleep disordered breathing events is 2.9%. Snoring  occurs for 31.9% (30 dB) of the study, 3.7% is very loud. The mean pulse rate is 54 BPM.  TREATMENT CONSIDERATIONS: Consider nasal continuous positive airway pressure (CPAP/AutoPAP) as the initial  treatment  choice based on the AHI severity and co-morbidities. A mandibular advancement splint (MAS) or referral to an  ENT surgeon for modification to the airway should be considered to reduce the potential contribution of JILLIAN on existing  diseases if the patient prefers an alternative therapy or the CPAP trial is unsuccessful. A Mandibular Advancement Splint  (MAS) will likely provide treatment benefit independent of JILLIAN severity. The patient should avoid sleeping supine given  non-supine AHI is in the normal range.  DISEASE MANAGEMENT CONSIDERATIONS: None.

## 2019-12-07 NOTE — PROGRESS NOTES
PHYSICIAN INTERPRETATION AND COMMENTS: Findings are consistent with moderate, positional obstructive  sleep apnea (JILLIAN). AHI was 20.0/hr with 4.8 hours sleep. SpO2 tayo was 87.8%. Treatment indicated with CPAP. Please  refer to sleep disorders clinic  for prompt evaluation and management. AutoPAP 5-20 cmwp with mask of choice.  CLINICAL HISTORY: 49 year old male presented with: Snoring and fragmented sleep. 16 inch neck, BMI of 38, an  [place] sleepiness score of 6, no co-morbidities and symptoms of nocturnal snoring and witnessed apneas. Based on the  clinical history, the patient has a high pre-test probability of having severe JILLIAN.  SLEEP STUDY FINDINGS: Patient underwent a one night Home Sleep Test and by behavioral criteria, slept for  approximately 4.8 hours, with a sleep latency of 3 minutes and a sleep efficiency of 72.7%. Moderate sleep disordered  breathing (AHI=20) is noted based on a 4% hypopnea desaturation criteria, predominantly in the supine position (22  events/hour). The patient slept supine 88.7% of the night based on valid recording time of 4.8 hours and is 5.5 times as likely to  have apneas/hypopneas when supine. When considering more subtle measures of sleep disordered breathing, the overall  respiratory disturbance index is also moderate (RDI=38) based on a 1% hypopnea desaturation criteria with confirmation by  surrogate arousal indicators. The apneas/hypopneas are accompanied by minimal oxygen desaturation (percent time below 90%  SpO2: 0.3%, Min SpO2: 87.8%). The average desaturation across all sleep disordered breathing events is 2.9%. Snoring  occurs for 31.9% (30 dB) of the study, 3.7% is very loud. The mean pulse rate is 54 BPM.  TREATMENT CONSIDERATIONS: Consider nasal continuous positive airway pressure (CPAP/AutoPAP) as the initial  treatment choice based on the AHI severity and co-morbidities. A mandibular advancement splint (MAS) or referral to an  ENT surgeon for  modification to the airway should be considered to reduce the potential contribution of JILLIAN on existing  diseases if the patient prefers an alternative therapy or the CPAP trial is unsuccessful. A Mandibular Advancement Splint  (MAS) will likely provide treatment benefit independent of JILLIAN severity. The patient should avoid sleeping supine given  non-supine AHI is in the normal range.  DISEASE MANAGEMENT CONSIDERATIONS: None.

## 2019-12-30 ENCOUNTER — HOSPITAL ENCOUNTER (INPATIENT)
Facility: HOSPITAL | Age: 49
LOS: 3 days | Discharge: HOME | DRG: 304 | End: 2020-01-02
Attending: EMERGENCY MEDICINE | Admitting: INTERNAL MEDICINE
Payer: COMMERCIAL

## 2019-12-30 ENCOUNTER — OFFICE VISIT (OUTPATIENT)
Dept: INTERNAL MEDICINE | Facility: CLINIC | Age: 49
End: 2019-12-30
Payer: COMMERCIAL

## 2019-12-30 VITALS
WEIGHT: 251.31 LBS | BODY MASS INDEX: 37.22 KG/M2 | OXYGEN SATURATION: 100 % | HEIGHT: 69 IN | TEMPERATURE: 98 F | HEART RATE: 57 BPM | SYSTOLIC BLOOD PRESSURE: 210 MMHG | DIASTOLIC BLOOD PRESSURE: 104 MMHG

## 2019-12-30 DIAGNOSIS — I16.0 HYPERTENSIVE URGENCY: ICD-10-CM

## 2019-12-30 DIAGNOSIS — I50.30 (HFPEF) HEART FAILURE WITH PRESERVED EJECTION FRACTION: ICD-10-CM

## 2019-12-30 DIAGNOSIS — I16.1 HYPERTENSIVE EMERGENCY: ICD-10-CM

## 2019-12-30 DIAGNOSIS — D35.2 PROLACTINOMA: ICD-10-CM

## 2019-12-30 DIAGNOSIS — R07.9 CHEST PAIN: ICD-10-CM

## 2019-12-30 DIAGNOSIS — I16.0 HYPERTENSIVE URGENCY: Primary | ICD-10-CM

## 2019-12-30 DIAGNOSIS — R11.14 BILIOUS VOMITING WITH NAUSEA: ICD-10-CM

## 2019-12-30 DIAGNOSIS — K92.0 HEMATEMESIS WITH NAUSEA: ICD-10-CM

## 2019-12-30 DIAGNOSIS — G47.33 OSA (OBSTRUCTIVE SLEEP APNEA): Primary | ICD-10-CM

## 2019-12-30 DIAGNOSIS — I10 HTN (HYPERTENSION): ICD-10-CM

## 2019-12-30 PROBLEM — D72.829 LEUKOCYTOSIS: Status: ACTIVE | Noted: 2019-12-30

## 2019-12-30 PROBLEM — K22.6 MALLORY-WEISS TEAR: Status: ACTIVE | Noted: 2019-12-30

## 2019-12-30 LAB
ALBUMIN SERPL BCP-MCNC: 4.1 G/DL (ref 3.5–5.2)
ALP SERPL-CCNC: 71 U/L (ref 55–135)
ALT SERPL W/O P-5'-P-CCNC: 24 U/L (ref 10–44)
AMPHET+METHAMPHET UR QL: NEGATIVE
ANION GAP SERPL CALC-SCNC: 13 MMOL/L (ref 8–16)
APTT BLDCRRT: 32.1 SEC (ref 21–32)
AST SERPL-CCNC: 26 U/L (ref 10–40)
BACTERIA #/AREA URNS HPF: ABNORMAL /HPF
BARBITURATES UR QL SCN>200 NG/ML: NEGATIVE
BASOPHILS # BLD AUTO: 0.03 K/UL (ref 0–0.2)
BASOPHILS NFR BLD: 0.2 % (ref 0–1.9)
BENZODIAZ UR QL SCN>200 NG/ML: NEGATIVE
BILIRUB SERPL-MCNC: 1 MG/DL (ref 0.1–1)
BILIRUB UR QL STRIP: NEGATIVE
BNP SERPL-MCNC: 120 PG/ML (ref 0–99)
BUN SERPL-MCNC: 16 MG/DL (ref 6–20)
BZE UR QL SCN: NEGATIVE
CALCIUM SERPL-MCNC: 9.6 MG/DL (ref 8.7–10.5)
CANNABINOIDS UR QL SCN: NORMAL
CHLORIDE SERPL-SCNC: 94 MMOL/L (ref 95–110)
CK SERPL-CCNC: 530 U/L (ref 20–200)
CLARITY UR: CLEAR
CO2 SERPL-SCNC: 29 MMOL/L (ref 23–29)
COLOR UR: YELLOW
CREAT SERPL-MCNC: 1.5 MG/DL (ref 0.5–1.4)
CREAT UR-MCNC: 276.5 MG/DL (ref 23–375)
DIFFERENTIAL METHOD: ABNORMAL
EOSINOPHIL # BLD AUTO: 0 K/UL (ref 0–0.5)
EOSINOPHIL NFR BLD: 0 % (ref 0–8)
ERYTHROCYTE [DISTWIDTH] IN BLOOD BY AUTOMATED COUNT: 14 % (ref 11.5–14.5)
EST. GFR  (AFRICAN AMERICAN): >60 ML/MIN/1.73 M^2
EST. GFR  (NON AFRICAN AMERICAN): 54 ML/MIN/1.73 M^2
GLUCOSE SERPL-MCNC: 118 MG/DL (ref 70–110)
GLUCOSE UR QL STRIP: NEGATIVE
HCT VFR BLD AUTO: 53.7 % (ref 40–54)
HGB BLD-MCNC: 17.8 G/DL (ref 14–18)
HGB BLD-MCNC: 18.1 G/DL (ref 14–18)
HGB UR QL STRIP: ABNORMAL
HYALINE CASTS #/AREA URNS LPF: 1 /LPF
IMM GRANULOCYTES # BLD AUTO: 0.08 K/UL (ref 0–0.04)
IMM GRANULOCYTES NFR BLD AUTO: 0.5 % (ref 0–0.5)
INR PPP: 1.1 (ref 0.8–1.2)
KETONES UR QL STRIP: NEGATIVE
LEUKOCYTE ESTERASE UR QL STRIP: NEGATIVE
LIPASE SERPL-CCNC: 217 U/L (ref 4–60)
LYMPHOCYTES # BLD AUTO: 1.1 K/UL (ref 1–4.8)
LYMPHOCYTES NFR BLD: 7.4 % (ref 18–48)
MCH RBC QN AUTO: 30.6 PG (ref 27–31)
MCHC RBC AUTO-ENTMCNC: 33.7 G/DL (ref 32–36)
MCV RBC AUTO: 91 FL (ref 82–98)
METHADONE UR QL SCN>300 NG/ML: NEGATIVE
MICROSCOPIC COMMENT: ABNORMAL
MONOCYTES # BLD AUTO: 0.9 K/UL (ref 0.3–1)
MONOCYTES NFR BLD: 6.1 % (ref 4–15)
NEUTROPHILS # BLD AUTO: 12.6 K/UL (ref 1.8–7.7)
NEUTROPHILS NFR BLD: 85.8 % (ref 38–73)
NITRITE UR QL STRIP: NEGATIVE
NRBC BLD-RTO: 0 /100 WBC
OB PNL GAST: POSITIVE
OPIATES UR QL SCN: NEGATIVE
PCP UR QL SCN>25 NG/ML: NEGATIVE
PH UR STRIP: 7 [PH] (ref 5–8)
PLATELET # BLD AUTO: 215 K/UL (ref 150–350)
PMV BLD AUTO: 10.1 FL (ref 9.2–12.9)
POTASSIUM SERPL-SCNC: 4 MMOL/L (ref 3.5–5.1)
PROT SERPL-MCNC: 8.1 G/DL (ref 6–8.4)
PROT UR QL STRIP: ABNORMAL
PROTHROMBIN TIME: 11.2 SEC (ref 9–12.5)
RBC # BLD AUTO: 5.91 M/UL (ref 4.6–6.2)
RBC #/AREA URNS HPF: 10 /HPF (ref 0–4)
SODIUM SERPL-SCNC: 136 MMOL/L (ref 136–145)
SP GR UR STRIP: 1.02 (ref 1–1.03)
TOXICOLOGY INFORMATION: NORMAL
TROPONIN I SERPL DL<=0.01 NG/ML-MCNC: 0.02 NG/ML (ref 0–0.03)
TROPONIN I SERPL DL<=0.01 NG/ML-MCNC: 0.04 NG/ML (ref 0–0.03)
TSH SERPL DL<=0.005 MIU/L-ACNC: 1.53 UIU/ML (ref 0.4–4)
URN SPEC COLLECT METH UR: ABNORMAL
UROBILINOGEN UR STRIP-ACNC: NEGATIVE EU/DL
WBC # BLD AUTO: 14.64 K/UL (ref 3.9–12.7)
WBC #/AREA URNS HPF: 2 /HPF (ref 0–5)

## 2019-12-30 PROCEDURE — 93010 EKG 12-LEAD: ICD-10-PCS | Mod: 76,,, | Performed by: INTERNAL MEDICINE

## 2019-12-30 PROCEDURE — 93005 ELECTROCARDIOGRAM TRACING: CPT

## 2019-12-30 PROCEDURE — 83880 ASSAY OF NATRIURETIC PEPTIDE: CPT

## 2019-12-30 PROCEDURE — 63600175 PHARM REV CODE 636 W HCPCS: Performed by: EMERGENCY MEDICINE

## 2019-12-30 PROCEDURE — 27000190 HC CPAP FULL FACE MASK W/VALVE

## 2019-12-30 PROCEDURE — 25000003 PHARM REV CODE 250: Performed by: INTERNAL MEDICINE

## 2019-12-30 PROCEDURE — 85730 THROMBOPLASTIN TIME PARTIAL: CPT

## 2019-12-30 PROCEDURE — 82088 ASSAY OF ALDOSTERONE: CPT

## 2019-12-30 PROCEDURE — 20000000 HC ICU ROOM

## 2019-12-30 PROCEDURE — 85018 HEMOGLOBIN: CPT

## 2019-12-30 PROCEDURE — 99291 CRITICAL CARE FIRST HOUR: CPT | Mod: 25

## 2019-12-30 PROCEDURE — 93010 ELECTROCARDIOGRAM REPORT: CPT | Mod: ,,, | Performed by: INTERNAL MEDICINE

## 2019-12-30 PROCEDURE — 80053 COMPREHEN METABOLIC PANEL: CPT

## 2019-12-30 PROCEDURE — 81000 URINALYSIS NONAUTO W/SCOPE: CPT | Mod: 59

## 2019-12-30 PROCEDURE — S0119 PR ONDANSETRON, ORAL, 4MG: ICD-10-PCS | Mod: S$GLB,,, | Performed by: FAMILY MEDICINE

## 2019-12-30 PROCEDURE — 83001 ASSAY OF GONADOTROPIN (FSH): CPT

## 2019-12-30 PROCEDURE — 83002 ASSAY OF GONADOTROPIN (LH): CPT

## 2019-12-30 PROCEDURE — 99999 PR PBB SHADOW E&M-EST. PATIENT-LVL III: ICD-10-PCS | Mod: PBBFAC,,, | Performed by: FAMILY MEDICINE

## 2019-12-30 PROCEDURE — 63600175 PHARM REV CODE 636 W HCPCS: Performed by: INTERNAL MEDICINE

## 2019-12-30 PROCEDURE — 82271 OCCULT BLOOD OTHER SOURCES: CPT

## 2019-12-30 PROCEDURE — 80307 DRUG TEST PRSMV CHEM ANLYZR: CPT

## 2019-12-30 PROCEDURE — S0119 ONDANSETRON 4 MG: HCPCS | Mod: S$GLB,,, | Performed by: FAMILY MEDICINE

## 2019-12-30 PROCEDURE — 82024 ASSAY OF ACTH: CPT

## 2019-12-30 PROCEDURE — 85025 COMPLETE CBC W/AUTO DIFF WBC: CPT

## 2019-12-30 PROCEDURE — C9113 INJ PANTOPRAZOLE SODIUM, VIA: HCPCS | Performed by: INTERNAL MEDICINE

## 2019-12-30 PROCEDURE — 3080F DIAST BP >= 90 MM HG: CPT | Mod: CPTII,S$GLB,, | Performed by: FAMILY MEDICINE

## 2019-12-30 PROCEDURE — 84146 ASSAY OF PROLACTIN: CPT

## 2019-12-30 PROCEDURE — 84484 ASSAY OF TROPONIN QUANT: CPT | Mod: 91

## 2019-12-30 PROCEDURE — 85610 PROTHROMBIN TIME: CPT

## 2019-12-30 PROCEDURE — 3008F PR BODY MASS INDEX (BMI) DOCUMENTED: ICD-10-PCS | Mod: CPTII,S$GLB,, | Performed by: FAMILY MEDICINE

## 2019-12-30 PROCEDURE — 36415 COLL VENOUS BLD VENIPUNCTURE: CPT

## 2019-12-30 PROCEDURE — 3077F SYST BP >= 140 MM HG: CPT | Mod: CPTII,S$GLB,, | Performed by: FAMILY MEDICINE

## 2019-12-30 PROCEDURE — 83835 ASSAY OF METANEPHRINES: CPT

## 2019-12-30 PROCEDURE — 99214 PR OFFICE/OUTPT VISIT, EST, LEVL IV, 30-39 MIN: ICD-10-PCS | Mod: S$GLB,,, | Performed by: FAMILY MEDICINE

## 2019-12-30 PROCEDURE — 83690 ASSAY OF LIPASE: CPT

## 2019-12-30 PROCEDURE — 84484 ASSAY OF TROPONIN QUANT: CPT

## 2019-12-30 PROCEDURE — 94660 CPAP INITIATION&MGMT: CPT

## 2019-12-30 PROCEDURE — 25000003 PHARM REV CODE 250: Performed by: EMERGENCY MEDICINE

## 2019-12-30 PROCEDURE — 3080F PR MOST RECENT DIASTOLIC BLOOD PRESSURE >= 90 MM HG: ICD-10-PCS | Mod: CPTII,S$GLB,, | Performed by: FAMILY MEDICINE

## 2019-12-30 PROCEDURE — 82550 ASSAY OF CK (CPK): CPT

## 2019-12-30 PROCEDURE — 3008F BODY MASS INDEX DOCD: CPT | Mod: CPTII,S$GLB,, | Performed by: FAMILY MEDICINE

## 2019-12-30 PROCEDURE — 99214 OFFICE O/P EST MOD 30 MIN: CPT | Mod: S$GLB,,, | Performed by: FAMILY MEDICINE

## 2019-12-30 PROCEDURE — 83003 ASSAY GROWTH HORMONE (HGH): CPT

## 2019-12-30 PROCEDURE — 84443 ASSAY THYROID STIM HORMONE: CPT

## 2019-12-30 PROCEDURE — 99900035 HC TECH TIME PER 15 MIN (STAT)

## 2019-12-30 PROCEDURE — 99999 PR PBB SHADOW E&M-EST. PATIENT-LVL III: CPT | Mod: PBBFAC,,, | Performed by: FAMILY MEDICINE

## 2019-12-30 PROCEDURE — 3077F PR MOST RECENT SYSTOLIC BLOOD PRESSURE >= 140 MM HG: ICD-10-PCS | Mod: CPTII,S$GLB,, | Performed by: FAMILY MEDICINE

## 2019-12-30 PROCEDURE — 96375 TX/PRO/DX INJ NEW DRUG ADDON: CPT | Mod: 59

## 2019-12-30 PROCEDURE — 96374 THER/PROPH/DIAG INJ IV PUSH: CPT

## 2019-12-30 RX ORDER — IBUPROFEN 200 MG
24 TABLET ORAL
Status: DISCONTINUED | OUTPATIENT
Start: 2019-12-30 | End: 2020-01-02 | Stop reason: HOSPADM

## 2019-12-30 RX ORDER — ONDANSETRON 2 MG/ML
4 INJECTION INTRAMUSCULAR; INTRAVENOUS
Status: COMPLETED | OUTPATIENT
Start: 2019-12-30 | End: 2019-12-30

## 2019-12-30 RX ORDER — IBUPROFEN 200 MG
16 TABLET ORAL
Status: DISCONTINUED | OUTPATIENT
Start: 2019-12-30 | End: 2020-01-02 | Stop reason: HOSPADM

## 2019-12-30 RX ORDER — LORAZEPAM 0.5 MG/1
0.5 TABLET ORAL EVERY 6 HOURS PRN
Status: DISCONTINUED | OUTPATIENT
Start: 2019-12-30 | End: 2020-01-02 | Stop reason: HOSPADM

## 2019-12-30 RX ORDER — POTASSIUM CHLORIDE 20 MEQ/15ML
40 SOLUTION ORAL
Status: DISCONTINUED | OUTPATIENT
Start: 2019-12-30 | End: 2020-01-02 | Stop reason: HOSPADM

## 2019-12-30 RX ORDER — ONDANSETRON 8 MG/1
8 TABLET, ORALLY DISINTEGRATING ORAL EVERY 8 HOURS PRN
Status: DISCONTINUED | OUTPATIENT
Start: 2019-12-30 | End: 2019-12-31

## 2019-12-30 RX ORDER — ONDANSETRON 4 MG/1
8 TABLET, ORALLY DISINTEGRATING ORAL ONCE
Status: COMPLETED | OUTPATIENT
Start: 2019-12-30 | End: 2019-12-30

## 2019-12-30 RX ORDER — HYDRALAZINE HYDROCHLORIDE 20 MG/ML
10 INJECTION INTRAMUSCULAR; INTRAVENOUS
Status: COMPLETED | OUTPATIENT
Start: 2019-12-30 | End: 2019-12-30

## 2019-12-30 RX ORDER — INSULIN ASPART 100 [IU]/ML
1-10 INJECTION, SOLUTION INTRAVENOUS; SUBCUTANEOUS
Status: DISCONTINUED | OUTPATIENT
Start: 2019-12-30 | End: 2020-01-02 | Stop reason: HOSPADM

## 2019-12-30 RX ORDER — SODIUM CHLORIDE 0.9 % (FLUSH) 0.9 %
10 SYRINGE (ML) INJECTION
Status: DISCONTINUED | OUTPATIENT
Start: 2019-12-30 | End: 2020-01-02 | Stop reason: HOSPADM

## 2019-12-30 RX ORDER — ACETAMINOPHEN 325 MG/1
650 TABLET ORAL EVERY 4 HOURS PRN
Status: DISCONTINUED | OUTPATIENT
Start: 2019-12-30 | End: 2020-01-02 | Stop reason: HOSPADM

## 2019-12-30 RX ORDER — CLONIDINE HYDROCHLORIDE 0.3 MG/1
0.3 TABLET ORAL
Status: COMPLETED | OUTPATIENT
Start: 2019-12-30 | End: 2019-12-30

## 2019-12-30 RX ORDER — PANTOPRAZOLE SODIUM 40 MG/10ML
40 INJECTION, POWDER, LYOPHILIZED, FOR SOLUTION INTRAVENOUS 2 TIMES DAILY
Status: DISCONTINUED | OUTPATIENT
Start: 2019-12-30 | End: 2019-12-31

## 2019-12-30 RX ORDER — ACETAMINOPHEN 325 MG/1
650 TABLET ORAL EVERY 8 HOURS PRN
Status: DISCONTINUED | OUTPATIENT
Start: 2019-12-30 | End: 2020-01-02 | Stop reason: HOSPADM

## 2019-12-30 RX ORDER — LANOLIN ALCOHOL/MO/W.PET/CERES
800 CREAM (GRAM) TOPICAL
Status: DISCONTINUED | OUTPATIENT
Start: 2019-12-30 | End: 2020-01-02 | Stop reason: HOSPADM

## 2019-12-30 RX ORDER — SODIUM,POTASSIUM PHOSPHATES 280-250MG
2 POWDER IN PACKET (EA) ORAL
Status: DISCONTINUED | OUTPATIENT
Start: 2019-12-30 | End: 2020-01-02 | Stop reason: HOSPADM

## 2019-12-30 RX ORDER — NICARDIPINE HYDROCHLORIDE 0.2 MG/ML
2.5 INJECTION INTRAVENOUS CONTINUOUS
Status: DISCONTINUED | OUTPATIENT
Start: 2019-12-30 | End: 2019-12-31

## 2019-12-30 RX ORDER — ACETAMINOPHEN 500 MG
1000 TABLET ORAL EVERY 8 HOURS PRN
Status: DISCONTINUED | OUTPATIENT
Start: 2019-12-30 | End: 2020-01-02 | Stop reason: HOSPADM

## 2019-12-30 RX ORDER — HYDROCODONE BITARTRATE AND ACETAMINOPHEN 10; 325 MG/1; MG/1
1 TABLET ORAL EVERY 6 HOURS PRN
Status: DISCONTINUED | OUTPATIENT
Start: 2019-12-30 | End: 2020-01-02 | Stop reason: HOSPADM

## 2019-12-30 RX ORDER — ONDANSETRON 2 MG/ML
4 INJECTION INTRAMUSCULAR; INTRAVENOUS EVERY 8 HOURS PRN
Status: DISCONTINUED | OUTPATIENT
Start: 2019-12-30 | End: 2019-12-31

## 2019-12-30 RX ADMIN — NITROGLYCERIN 1 INCH: 20 OINTMENT TOPICAL at 12:12

## 2019-12-30 RX ADMIN — ONDANSETRON 8 MG: 8 TABLET, ORALLY DISINTEGRATING ORAL at 09:12

## 2019-12-30 RX ADMIN — HYDRALAZINE HYDROCHLORIDE 10 MG: 20 INJECTION INTRAMUSCULAR; INTRAVENOUS at 01:12

## 2019-12-30 RX ADMIN — CLONIDINE HYDROCHLORIDE 0.3 MG: 0.3 TABLET ORAL at 02:12

## 2019-12-30 RX ADMIN — ONDANSETRON 4 MG: 2 INJECTION INTRAMUSCULAR; INTRAVENOUS at 12:12

## 2019-12-30 RX ADMIN — PANTOPRAZOLE SODIUM 40 MG: 40 INJECTION, POWDER, FOR SOLUTION INTRAVENOUS at 06:12

## 2019-12-30 RX ADMIN — ONDANSETRON 8 MG: 4 TABLET, ORALLY DISINTEGRATING ORAL at 11:12

## 2019-12-30 RX ADMIN — NICARDIPINE HYDROCHLORIDE 2.5 MG/HR: 0.2 INJECTION, SOLUTION INTRAVENOUS at 03:12

## 2019-12-30 RX ADMIN — NICARDIPINE HYDROCHLORIDE 10 MG/HR: 0.2 INJECTION, SOLUTION INTRAVENOUS at 11:12

## 2019-12-30 NOTE — SUBJECTIVE & OBJECTIVE
Past Medical History:   Diagnosis Date    Hypogonadism, male     IGT (impaired glucose tolerance)     Obesity     Prolactinoma        History reviewed. No pertinent surgical history.    Review of patient's allergies indicates:  No Known Allergies    Current Facility-Administered Medications on File Prior to Encounter   Medication    [COMPLETED] ondansetron disintegrating tablet 8 mg     Current Outpatient Medications on File Prior to Encounter   Medication Sig    cabergoline (DOSTINEX) 0.5 mg tablet Take 2 Tablets by mouth on Monday and Friday, and 1 tablet on wednesday    hydrALAZINE (APRESOLINE) 25 MG tablet Take 1 tablet (25 mg total) by mouth 3 (three) times daily.    sildenafil (REVATIO) 20 mg Tab Take 1-5 tablets as needed on an empty stomach with no alcohol an hour before desiring an erection.    testosterone cypionate (DEPOTESTOTERONE CYPIONATE) 200 mg/mL injection INJECT 1 ML INTO THE MUSCLE EVERY 21 DAYS    [DISCONTINUED] cyclobenzaprine (FLEXERIL) 10 MG tablet Take 1 tablet (10 mg total) by mouth every evening. for 10 days     Family History     Problem Relation (Age of Onset)    Diabetes Father    Hypertension Father        Tobacco Use    Smoking status: Current Every Day Smoker     Packs/day: 1.00     Years: 20.00     Pack years: 20.00   Substance and Sexual Activity    Alcohol use: No     Frequency: 2-4 times a month     Drinks per session: 1 or 2     Binge frequency: Less than monthly    Drug use: No    Sexual activity: Yes     Partners: Female     Review of Systems   Constitutional: Positive for appetite change and chills. Negative for diaphoresis, fatigue and fever.   HENT: Negative for congestion, facial swelling, mouth sores, rhinorrhea, sore throat and voice change.    Eyes: Positive for redness. Negative for photophobia, discharge, itching and visual disturbance.        [place] sclera;    Respiratory: Negative for apnea, cough, choking, chest tightness, shortness of breath, wheezing  and stridor.    Cardiovascular: Positive for chest pain. Negative for palpitations and leg swelling.   Gastrointestinal: Positive for nausea and vomiting. Negative for abdominal distention and abdominal pain.        Rethching   Genitourinary: Negative for difficulty urinating, hematuria and urgency.   Musculoskeletal: Negative for arthralgias, back pain and gait problem.   Skin: Negative for color change, pallor and rash.   Allergic/Immunologic: Negative.    Neurological: Positive for dizziness, light-headedness and headaches. Negative for tremors, seizures, syncope, speech difficulty and weakness.   Hematological: Negative for adenopathy. Does not bruise/bleed easily.   Psychiatric/Behavioral: Negative for agitation, behavioral problems and confusion.     Objective:     Vital Signs (Most Recent):  Temp: 98.9 °F (37.2 °C) (12/30/19 1205)  Pulse: 67 (12/30/19 1515)  Resp: 17 (12/30/19 1515)  BP: (!) 176/87 (12/30/19 1515)  SpO2: 99 % (12/30/19 1515) Vital Signs (24h Range):  Temp:  [98.1 °F (36.7 °C)-98.9 °F (37.2 °C)] 98.9 °F (37.2 °C)  Pulse:  [51-78] 67  Resp:  [17-24] 17  SpO2:  [98 %-100 %] 99 %  BP: (176-222)/() 176/87     Weight: 114 kg (251 lb 5.2 oz)  Body mass index is 37.11 kg/m².    Physical Exam   Constitutional: He is oriented to person, place, and time. He appears well-developed and well-nourished. No distress.   HENT:   Head: Normocephalic and atraumatic.   Eyes: Pupils are equal, round, and reactive to light. EOM are normal. Right eye exhibits no discharge. Left eye exhibits no discharge. No scleral icterus.   Neck: Normal range of motion. Neck supple. No JVD present. No tracheal deviation present. No thyromegaly present.   Cardiovascular: Normal rate, regular rhythm and normal heart sounds.   Pulmonary/Chest: Effort normal and breath sounds normal. No stridor. No respiratory distress. He has no wheezes.   Abdominal: Soft. Bowel sounds are normal. He exhibits no distension. There is no  tenderness.   Genitourinary:   Genitourinary Comments: deferred   Musculoskeletal: Normal range of motion.   Neurological: He is alert and oriented to person, place, and time.   Skin: Skin is warm and dry. Capillary refill takes less than 2 seconds. No rash noted. He is not diaphoretic. No erythema.   Psychiatric: He has a normal mood and affect. His behavior is normal. Judgment and thought content normal.   Nursing note and vitals reviewed.        CRANIAL NERVES     CN II   Visual fields full to confrontation.     CN III, IV, VI   Pupils are equal, round, and reactive to light.  Extraocular motions are normal.     CN V   Facial sensation intact.     CN VII   Facial expression full, symmetric.     CN VIII   CN VIII normal.     CN IX, X   CN IX normal.     CN XII   CN XII normal.        Significant Labs:   BMP:   Recent Labs   Lab 12/30/19  1329   *      K 4.0   CL 94*   CO2 29   BUN 16   CREATININE 1.5*   CALCIUM 9.6     CBC:   Recent Labs   Lab 12/30/19  1220   WBC 14.64*   HGB 18.1*   HCT 53.7        CMP:   Recent Labs   Lab 12/30/19  1329      K 4.0   CL 94*   CO2 29   *   BUN 16   CREATININE 1.5*   CALCIUM 9.6   PROT 8.1   ALBUMIN 4.1   BILITOT 1.0   ALKPHOS 71   AST 26   ALT 24   ANIONGAP 13   EGFRNONAA 54*     Cardiac Markers:   Recent Labs   Lab 12/30/19  1220   *     Coagulation:   Recent Labs   Lab 12/30/19  1220   INR 1.1   APTT 32.1*     Lipid Panel: No results for input(s): CHOL, HDL, LDLCALC, TRIG, CHOLHDL in the last 48 hours.  Magnesium: No results for input(s): MG in the last 48 hours.    Significant Imaging:   Imaging Results          X-Ray Chest AP Portable (Final result)  Result time 12/30/19 12:43:00    Final result by JUAN PABLO Rogers Sr., MD (12/30/19 12:43:00)                 Impression:      1. The lungs are clear.  2. The size of the heart is prominent.  This may be secondary to magnification.  .      Electronically signed by: Griffin Rogers  MD  Date:    12/30/2019  Time:    12:43             Narrative:    EXAMINATION:  XR CHEST AP PORTABLE    CLINICAL HISTORY:  chest pain;    COMPARISON:  None    FINDINGS:  The size of the heart is prominent.  The lungs are clear. There is no pneumothorax.  The costophrenic angles are sharp.

## 2019-12-30 NOTE — H&P
[facility] Hospital Medicine  History & Physical    Patient Name: Grant Rodriguez  MRN: [id number]  Admission Date: 12/30/2019  Attending Physician: García Guthrie MD  Primary Care Provider: Fernando Jimenez MD         Patient information was obtained from patient, caregiver / friend and ER records.     Subjective:     Principal Problem:<principal problem not specified>    Chief Complaint:   Chief Complaint   Patient presents with    Chest Pain     Seen by Dr Strange at [facility]. sent over for further evaluation. HTN, dizziness and chest pain.         HPI: 48 YO AA male presenting to the hospital by private auto;  He complains of 2 days of nausea and vomiting and inability to keep anything down;   To day he reports several retching episodes and noted vomiting of bright red blood; He has a history of recently elevated blood pressures  3 weeks  Ago he was told to monitor his BP 3 times a day and was given hydralazine for PRN use;  He never recorded his blood pressures; He also was recently  told of having obstructive sleep apnea; however is not on any therapy for this.  On presentation to the ED he complains of headaches and had systolic BP   In excess of 220 , prompting institution of a cardene drip and subsequent admission to the ICU; Patient is also on therapy  With Dostinex for a pituitary tumor   with prolactinoma, diagnosed several years ago;  He see an endocrinologist at [facility] [place]. He also has impaired glucose tolerance and is overweight.  Family history is notable for hypertension and diabetes;     Past Medical History:   Diagnosis Date    Hypogonadism, male     IGT (impaired glucose tolerance)     Obesity     Prolactinoma        History reviewed. No pertinent surgical history.    Review of patient's allergies indicates:  No Known Allergies    Current Facility-Administered Medications on File Prior to Encounter   Medication    [COMPLETED] ondansetron disintegrating tablet 8  mg     Current Outpatient Medications on File Prior to Encounter   Medication Sig    cabergoline (DOSTINEX) 0.5 mg tablet Take 2 Tablets by mouth on Monday and Friday, and 1 tablet on wednesday    hydrALAZINE (APRESOLINE) 25 MG tablet Take 1 tablet (25 mg total) by mouth 3 (three) times daily.    sildenafil (REVATIO) 20 mg Tab Take 1-5 tablets as needed on an empty stomach with no alcohol an hour before desiring an erection.    testosterone cypionate (DEPOTESTOTERONE CYPIONATE) 200 mg/mL injection INJECT 1 ML INTO THE MUSCLE EVERY 21 DAYS    [DISCONTINUED] cyclobenzaprine (FLEXERIL) 10 MG tablet Take 1 tablet (10 mg total) by mouth every evening. for 10 days     Family History     Problem Relation (Age of Onset)    Diabetes Father    Hypertension Father        Tobacco Use    Smoking status: Current Every Day Smoker     Packs/day: 1.00     Years: 20.00     Pack years: 20.00   Substance and Sexual Activity    Alcohol use: No     Frequency: 2-4 times a month     Drinks per session: 1 or 2     Binge frequency: Less than monthly    Drug use: No    Sexual activity: Yes     Partners: Female     Review of Systems   Constitutional: Positive for appetite change and chills. Negative for diaphoresis, fatigue and fever.   HENT: Negative for congestion, facial swelling, mouth sores, rhinorrhea, sore throat and voice change.    Eyes: Positive for redness. Negative for photophobia, discharge, itching and visual disturbance.        [place] sclera;    Respiratory: Negative for apnea, cough, choking, chest tightness, shortness of breath, wheezing and stridor.    Cardiovascular: Positive for chest pain. Negative for palpitations and leg swelling.   Gastrointestinal: Positive for nausea and vomiting. Negative for abdominal distention and abdominal pain.        Rethching   Genitourinary: Negative for difficulty urinating, hematuria and urgency.   Musculoskeletal: Negative for arthralgias, back pain and gait problem.   Skin:  Negative for color change, pallor and rash.   Allergic/Immunologic: Negative.    Neurological: Positive for dizziness, light-headedness and headaches. Negative for tremors, seizures, syncope, speech difficulty and weakness.   Hematological: Negative for adenopathy. Does not bruise/bleed easily.   Psychiatric/Behavioral: Negative for agitation, behavioral problems and confusion.     Objective:     Vital Signs (Most Recent):  Temp: 98.9 °F (37.2 °C) (12/30/19 1205)  Pulse: 67 (12/30/19 1515)  Resp: 17 (12/30/19 1515)  BP: (!) 176/87 (12/30/19 1515)  SpO2: 99 % (12/30/19 1515) Vital Signs (24h Range):  Temp:  [98.1 °F (36.7 °C)-98.9 °F (37.2 °C)] 98.9 °F (37.2 °C)  Pulse:  [51-78] 67  Resp:  [17-24] 17  SpO2:  [98 %-100 %] 99 %  BP: (176-222)/() 176/87     Weight: 114 kg (251 lb 5.2 oz)  Body mass index is 37.11 kg/m².    Physical Exam   Constitutional: He is oriented to person, place, and time. He appears well-developed and well-nourished. No distress.   HENT:   Head: Normocephalic and atraumatic.   Eyes: Pupils are equal, round, and reactive to light. EOM are normal. Right eye exhibits no discharge. Left eye exhibits no discharge. No scleral icterus.   Neck: Normal range of motion. Neck supple. No JVD present. No tracheal deviation present. No thyromegaly present.   Cardiovascular: Normal rate, regular rhythm and normal heart sounds.   Pulmonary/Chest: Effort normal and breath sounds normal. No stridor. No respiratory distress. He has no wheezes.   Abdominal: Soft. Bowel sounds are normal. He exhibits no distension. There is no tenderness.   Genitourinary:   Genitourinary Comments: deferred   Musculoskeletal: Normal range of motion.   Neurological: He is alert and oriented to person, place, and time.   Skin: Skin is warm and dry. Capillary refill takes less than 2 seconds. No rash noted. He is not diaphoretic. No erythema.   Psychiatric: He has a normal mood and affect. His behavior is normal. Judgment and  thought content normal.   Nursing note and vitals reviewed.        CRANIAL NERVES     CN II   Visual fields full to confrontation.     CN III, IV, VI   Pupils are equal, round, and reactive to light.  Extraocular motions are normal.     CN V   Facial sensation intact.     CN VII   Facial expression full, symmetric.     CN VIII   CN VIII normal.     CN IX, X   CN IX normal.     CN XII   CN XII normal.        Significant Labs:   BMP:   Recent Labs   Lab 12/30/19  1329   *      K 4.0   CL 94*   CO2 29   BUN 16   CREATININE 1.5*   CALCIUM 9.6     CBC:   Recent Labs   Lab 12/30/19  1220   WBC 14.64*   HGB 18.1*   HCT 53.7        CMP:   Recent Labs   Lab 12/30/19  1329      K 4.0   CL 94*   CO2 29   *   BUN 16   CREATININE 1.5*   CALCIUM 9.6   PROT 8.1   ALBUMIN 4.1   BILITOT 1.0   ALKPHOS 71   AST 26   ALT 24   ANIONGAP 13   EGFRNONAA 54*     Cardiac Markers:   Recent Labs   Lab 12/30/19  1220   *     Coagulation:   Recent Labs   Lab 12/30/19  1220   INR 1.1   APTT 32.1*     Lipid Panel: No results for input(s): CHOL, HDL, LDLCALC, TRIG, CHOLHDL in the last 48 hours.  Magnesium: No results for input(s): MG in the last 48 hours.    Significant Imaging:   Imaging Results          X-Ray Chest AP Portable (Final result)  Result time 12/30/19 12:43:00    Final result by JUAN PABLO Rogers Sr., MD (12/30/19 12:43:00)                 Impression:      1. The lungs are clear.  2. The size of the heart is prominent.  This may be secondary to magnification.  .      Electronically signed by: Griffin Rogers MD  Date:    12/30/2019  Time:    12:43             Narrative:    EXAMINATION:  XR CHEST AP PORTABLE    CLINICAL HISTORY:  chest pain;    COMPARISON:  None    FINDINGS:  The size of the heart is prominent.  The lungs are clear. There is no pneumothorax.  The costophrenic angles are sharp.                              Assessment/Plan:     Leukocytosis  Elevated on admission; question reactive;  Complained of chills in the days preceeding admission  Will culture broadly  for temp spikes;  Trend labs.      JILLIAN (obstructive sleep apnea)    Likely contributing to refractory hypertension;  Will have pulmonary see;  Will need confirmatory study; likely C-PAP titration      Kadie-Rajan tear  N/V,  rethching in setting of hypertensive urgency  Will place on clear liquids for now  PPI therapy;  Consult GI;   Monitor H/H      Hypertensive urgency  Newly diagnosed hypertension  Must exclude secondary causes RVH; OSAS; Endocrine hypertension;   Will check pituitary axis (FSH, LH, ACTH, TSH; GH, Prolactin)  Aldosterone/renin ratio  Plasma free metanephrines  Admitted to ICU; Continue cardene with institution of an oral regimen pending course.    Chest pain    Atypical;  Trend troponins; initial troponin 0.019  ECG  Cardiac monitoring for ischemia    Prolactinoma  Will check pituitary axis;   Obtain old records if possible.  Continue dostinex for now (pending resluts)        VTE Risk Mitigation (From admission, onward)         Ordered     Place sequential compression device  Until discontinued      12/30/19 1547     Place GERDA hose  Until discontinued      12/30/19 1547     Place sequential compression device  Until discontinued      12/30/19 1547                   García Victor MD  Department of Hospital Medicine   [facility]

## 2019-12-30 NOTE — PROGRESS NOTES
"Subjective:       Patient ID: Grant Rodriguez is a 49 y.o. male.    Chief Complaint: Emesis    50 yo male with h/o prolactinoma on dostinex, elevated blood pressure, tobacco use, obesity, recently diagnosed moderate to severe JILLIAN and hypogonadism here with 2 day history of nausea and vomiting over the past 48; reports vomiting pinkish bile that looks like "tobacco." Unable to tolerate any PO intake without vomiting. Denies abdominal pain; no diarrhea or change in bowel habits. He was prescribed blood pressure medicine recently at a visit with his PCP but only took the medication once (hydralazine). He denies headache or chest pain. +diaphoresis  Temp 98.1    Complains mostly of intense nausea.     does not have any pertinent problems on file.  Past Medical History:   Diagnosis Date    Hypogonadism, male     IGT (impaired glucose tolerance)     Obesity     Prolactinoma      No past surgical history on file.  Family History   Problem Relation Age of Onset    Diabetes Father     Hypertension Father     Thyroid disease Neg Hx     Calcium disorder Neg Hx      Social History     Socioeconomic History    Marital status: Single     Spouse name: Not on file    Number of children: 0    Years of education: Not on file    Highest education level: Not on file   Occupational History    Occupation: self employed - [employer]      Employer: [email or web address]   Social Needs    Financial resource strain: Not very hard    Food insecurity:     Worry: Never true     Inability: Never true    Transportation needs:     Medical: No     Non-medical: No   Tobacco Use    Smoking status: Current Every Day Smoker     Packs/day: 1.00     Years: 20.00     Pack years: 20.00   Substance and Sexual Activity    Alcohol use: No     Frequency: 2-4 times a month     Drinks per session: 1 or 2     Binge frequency: Less than monthly    Drug use: No    Sexual activity: Yes     Partners: Female   Lifestyle    Physical activity: "     Days per week: 4 days     Minutes per session: 70 min    Stress: Only a little   Relationships    Social connections:     Talks on phone: More than three times a week     Gets together: Twice a week     Attends [employer] service: Not on file     Active member of club or organization: Yes     Attends meetings of clubs or organizations: 1 to 4 times per year     Relationship status:    Other Topics Concern    Not on file   Social History Narrative    Not on file     Review of Systems     A comprehensive 14-point review of systems was reviewed with patient and was negative other than as specified above.     Objective:     Vitals:    12/30/19 1049   BP: (!) 210/104   Pulse:    Temp:         Physical Exam   Constitutional: He is oriented to person, place, and time. He appears well-developed and well-nourished. He appears distressed.   Ill appearing   HENT:   Head: Normocephalic and atraumatic.   Eyes: Pupils are equal, round, and reactive to light. EOM are normal.   Conjunctivae injected   Neck: Normal range of motion. Neck supple.   Cardiovascular: Normal rate, regular rhythm, normal heart sounds and intact distal pulses.   No murmur heard.  Pulmonary/Chest: Effort normal and breath sounds normal. He has no wheezes. He has no rales.   Abdominal: Soft. Bowel sounds are normal. He exhibits no distension and no mass. There is no tenderness. There is no rebound and no guarding. No hernia.   Musculoskeletal: Normal range of motion. He exhibits no deformity.   Neurological: He is alert and oriented to person, place, and time. No cranial nerve deficit. He exhibits normal muscle tone. Coordination normal.   Skin: Skin is warm. He is diaphoretic.   Psychiatric: He has a normal mood and affect. His behavior is normal.   Nursing note and vitals reviewed.      Assessment:       1. Hypertensive urgency    2. Bilious vomiting with nausea        Plan:           Problem List Items Addressed This Visit     None       Visit Diagnoses     Hypertensive urgency    -  Primary    Bilious vomiting with nausea        Relevant Medications    ondansetron disintegrating tablet 8 mg (Start on 12/30/2019 12:30 PM)      Referral made to emergency room for evaluation. Suspect viral gastroenteritis but think cardiac/end organ damage rule out would be prudent considering risk factors and BP today.

## 2019-12-30 NOTE — HPI
50 YO AA male presenting to the hospital by private auto;  He complains of 2 days of nausea and vomiting and inability to keep anything down;   To day he reports several retching episodes and noted vomiting of bright red blood; He has a history of recently elevated blood pressures  3 weeks  Ago he was told to monitor his BP 3 times a day and was given hydralazine for PRN use;  He never recorded his blood pressures; He also was recently  told of having obstructive sleep apnea; however is not on any therapy for this.  On presentation to the ED he complains of headaches and had systolic BP   In excess of 220 , prompting institution of a cardene drip and subsequent admission to the ICU; Patient is also on therapy  With Dostinex for a pituitary tumor   with prolactinoma, diagnosed several years ago;  He see an endocrinologist at [facility] in [place]. He also has impaired glucose tolerance and is overweight.  Family history is notable for hypertension and diabetes;

## 2019-12-30 NOTE — ASSESSMENT & PLAN NOTE
Elevated on admission; question reactive; Complained of chills in the days preceeding admission  Will culture broadly  for temp spikes;  Trend labs.

## 2019-12-30 NOTE — ASSESSMENT & PLAN NOTE
N/V,  rethching in setting of hypertensive urgency  Will place on clear liquids for now  PPI therapy;  Consult GI;   Monitor H/H

## 2019-12-30 NOTE — ASSESSMENT & PLAN NOTE
Likely contributing to refractory hypertension;  Will have pulmonary see;  Will need confirmatory study; likely C-PAP titration

## 2019-12-30 NOTE — ASSESSMENT & PLAN NOTE
Will check pituitary axis;   Obtain old records if possible.  Continue dostinex for now (pending resluts)

## 2019-12-30 NOTE — ASSESSMENT & PLAN NOTE
Newly diagnosed hypertension  Must exclude secondary causes RVH; OSAS; Endocrine hypertension;   Will check pituitary axis (FSH, LH, ACTH, TSH; GH, Prolactin)  Aldosterone/renin ratio  Plasma free metanephrines  Admitted to ICU; Continue cardene with institution of an oral regimen pending course.

## 2019-12-30 NOTE — ED PROVIDER NOTES
SCRIBE #1 NOTE: I, Rashard Mir, am scribing for, and in the presence of, Oumar Strange MD. I have scribed the entire note.      History      Chief Complaint   Patient presents with    Chest Pain     Seen by Dr Strange at PCP. sent over for further evaluation. HTN, dizziness and chest pain.      Review of patient's allergies indicates:  No Known Allergies     HPI   HPI    12/30/2019, 12:13 PM   History obtained from the patient      History of Present Illness: Grant Rodriguez is a 49 y.o. male patient who presents to the Emergency Department for chest pain, onset this morning. Pt was referred to the ED by Dr. Strange (Internal Medicine) for further evaluation. Symptoms are constant and moderate in severity. No mitigating or exacerbating factors reported. Associated sxs include coffee ground emesis x 1 day, generalized weakness, and dizziness. Pt also reports elevated BP. Patient denies any fever, chills, SOB, numbness, headache, and all other sxs at this time. No prior Tx reported. No further complaints or concerns at this time.     Arrival mode: Personal vehicle    PCP: Fernando Jimenez MD       Past Medical History:  Past Medical History:   Diagnosis Date    Hypogonadism, male     IGT (impaired glucose tolerance)     Obesity     Prolactinoma        Past Surgical History:  History reviewed. No pertinent surgical history.      Family History:  Family History   Problem Relation Age of Onset    Diabetes Father     Hypertension Father     Thyroid disease Neg Hx     Calcium disorder Neg Hx        Social History:  Social History     Tobacco Use    Smoking status: Current Every Day Smoker     Packs/day: 1.00     Years: 20.00     Pack years: 20.00   Substance and Sexual Activity    Alcohol use: No     Frequency: 2-4 times a month     Drinks per session: 1 or 2     Binge frequency: Less than monthly    Drug use: No    Sexual activity: Yes     Partners: Female       ROS   Review of Systems   Constitutional:  Negative for chills, diaphoresis, fatigue and fever.   HENT: Negative for sore throat.    Respiratory: Negative for shortness of breath.    Cardiovascular: Positive for chest pain.   Gastrointestinal: Positive for nausea and vomiting (coffee ground emesis).   Genitourinary: Negative for dysuria.   Musculoskeletal: Negative for back pain.   Skin: Negative for rash and wound.   Neurological: Positive for dizziness and weakness (generalized). Negative for light-headedness, numbness and headaches.   Hematological: Does not bruise/bleed easily.   All other systems reviewed and are negative.    Physical Exam      Initial Vitals [12/30/19 1205]   BP Pulse Resp Temp SpO2   (!) 213/110 62 18 98.9 °F (37.2 °C) 98 %      MAP       --          Physical Exam  Nursing Notes and Vital Signs Reviewed.  Constitutional: Patient is in no acute distress. Well-developed and well-nourished.  Head: Atraumatic. Normocephalic.  Eyes: PERRL. EOM intact. Conjunctivae are not pale. No scleral icterus.  ENT: Mucous membranes are moist. Oropharynx is clear and symmetric.    Neck: Supple. Full ROM. No lymphadenopathy.  Cardiovascular: Regular rate. Regular rhythm. No murmurs, rubs, or gallops. Distal pulses are 2+ and symmetric.  Pulmonary/Chest: No respiratory distress. Clear to auscultation bilaterally. No wheezing or rales.  Abdominal: Soft and non-distended.  There is no tenderness.  No rebound, guarding, or rigidity.   Musculoskeletal: Moves all extremities. No obvious deformities. No edema.  Skin: Warm and dry.  Neurological:  Alert, awake, and appropriate.  Normal speech.  No acute focal neurological deficits are appreciated.  Psychiatric: Normal affect. Good eye contact. Appropriate in content.    ED Course    Critical Care  Date/Time: 12/30/2019 2:48 PM  Performed by: Oumar Strange MD  Authorized by: Oumar Strange MD   Direct patient critical care time: 35 minutes  Additional history critical care time: 5 minutes  Ordering /  reviewing critical care time: 5 minutes  Documentation critical care time: 5 minutes  Consulting other physicians critical care time: 5 minutes  Consult with family critical care time: 5 minutes  Total critical care time (exclusive of procedural time) : 60 minutes  Critical care time was exclusive of separately billable procedures and treating other patients and teaching time.  Critical care was necessary to treat or prevent imminent or life-threatening deterioration of the following conditions: Hypertensive emergency.  Critical care was time spent personally by me on the following activities: blood draw for specimens, development of treatment plan with patient or surrogate, discussions with consultants, interpretation of cardiac output measurements, evaluation of patient's response to treatment, examination of patient, obtaining history from patient or surrogate, ordering and performing treatments and interventions, ordering and review of laboratory studies, ordering and review of radiographic studies, pulse oximetry and re-evaluation of patient's condition.        ED Vital Signs:  Vitals:    12/30/19 1203 12/30/19 1205 12/30/19 1220 12/30/19 1223   BP:  (!) 213/110  (!) 222/101   Pulse:  62 (!) 53 (!) 51   Resp:  18     Temp:  98.9 °F (37.2 °C)     TempSrc:  Oral     SpO2:  98%     Weight: 114 kg (251 lb 5.2 oz)       12/30/19 1225 12/30/19 1227 12/30/19 1303 12/30/19 1328   BP: (!) 202/94 (!) 195/95 (!) 215/112 (!) 215/105   Pulse: 71 68 70 (!) 54   Resp:   (!) 24 19   Temp:       TempSrc:       SpO2:   99% 100%   Weight:        12/30/19 1330 12/30/19 1346 12/30/19 1401 12/30/19 1515   BP: (!) 215/105 (!) 210/102 (!) 197/93 (!) 176/87   Pulse:  66 78 67   Resp:  20 (!) 21 17   Temp:       TempSrc:       SpO2:  100% 98% 99%   Weight:           Abnormal Lab Results:  Labs Reviewed   CBC W/ AUTO DIFFERENTIAL - Abnormal; Notable for the following components:       Result Value    WBC 14.64 (*)     Hemoglobin 18.1 (*)      Gran # (ANC) 12.6 (*)     Immature Grans (Abs) 0.08 (*)     Gran% 85.8 (*)     Lymph% 7.4 (*)     All other components within normal limits   URINALYSIS, REFLEX TO URINE CULTURE - Abnormal; Notable for the following components:    Protein, UA 2+ (*)     Occult Blood UA 2+ (*)     All other components within normal limits    Narrative:     Preferred Collection Type->Urine, Clean Catch   APTT - Abnormal; Notable for the following components:    aPTT 32.1 (*)     All other components within normal limits   B-TYPE NATRIURETIC PEPTIDE - Abnormal; Notable for the following components:     (*)     All other components within normal limits   COMPREHENSIVE METABOLIC PANEL - Abnormal; Notable for the following components:    Chloride 94 (*)     Glucose 118 (*)     Creatinine 1.5 (*)     eGFR if non  54 (*)     All other components within normal limits   CK - Abnormal; Notable for the following components:     (*)     All other components within normal limits   LIPASE - Abnormal; Notable for the following components:    Lipase 217 (*)     All other components within normal limits   URINALYSIS MICROSCOPIC - Abnormal; Notable for the following components:    RBC, UA 10 (*)     Bacteria Few (*)     All other components within normal limits    Narrative:     Preferred Collection Type->Urine, Clean Catch   OCCULT BLOOD, OTHER SOURCES - Abnormal; Notable for the following components:    Occult Blood Positive (*)     All other components within normal limits   PROTIME-INR   TROPONIN I   DRUG SCREEN PANEL, URINE EMERGENCY   DRUG SCREEN PANEL, URINE EMERGENCY        All Lab Results:  Results for orders placed or performed during the hospital encounter of 12/30/19   CBC auto differential   Result Value Ref Range    WBC 14.64 (H) 3.90 - 12.70 K/uL    RBC 5.91 4.60 - 6.20 M/uL    Hemoglobin 18.1 (H) 14.0 - 18.0 g/dL    Hematocrit 53.7 40.0 - 54.0 %    Mean Corpuscular Volume 91 82 - 98 fL    Mean  Corpuscular Hemoglobin 30.6 27.0 - 31.0 pg    Mean Corpuscular Hemoglobin Conc 33.7 32.0 - 36.0 g/dL    RDW 14.0 11.5 - 14.5 %    Platelets 215 150 - 350 K/uL    MPV 10.1 9.2 - 12.9 fL    Immature Granulocytes 0.5 0.0 - 0.5 %    Gran # (ANC) 12.6 (H) 1.8 - 7.7 K/uL    Immature Grans (Abs) 0.08 (H) 0.00 - 0.04 K/uL    Lymph # 1.1 1.0 - 4.8 K/uL    Mono # 0.9 0.3 - 1.0 K/uL    Eos # 0.0 0.0 - 0.5 K/uL    Baso # 0.03 0.00 - 0.20 K/uL    nRBC 0 0 /100 WBC    Gran% 85.8 (H) 38.0 - 73.0 %    Lymph% 7.4 (L) 18.0 - 48.0 %    Mono% 6.1 4.0 - 15.0 %    Eosinophil% 0.0 0.0 - 8.0 %    Basophil% 0.2 0.0 - 1.9 %    Differential Method Automated    Urinalysis, Reflex to Urine Culture Urine, Clean Catch   Result Value Ref Range    Specimen UA Urine, Clean Catch     Color, UA Yellow Yellow, Straw, Angie    Appearance, UA Clear Clear    pH, UA 7.0 5.0 - 8.0    Specific Gravity, UA 1.025 1.005 - 1.030    Protein, UA 2+ (A) Negative    Glucose, UA Negative Negative    Ketones, UA Negative Negative    Bilirubin (UA) Negative Negative    Occult Blood UA 2+ (A) Negative    Nitrite, UA Negative Negative    Urobilinogen, UA Negative <2.0 EU/dL    Leukocytes, UA Negative Negative   APTT   Result Value Ref Range    aPTT 32.1 (H) 21.0 - 32.0 sec   Protime-INR   Result Value Ref Range    Prothrombin Time 11.2 9.0 - 12.5 sec    INR 1.1 0.8 - 1.2   Brain natriuretic peptide   Result Value Ref Range     (H) 0 - 99 pg/mL   Comprehensive metabolic panel   Result Value Ref Range    Sodium 136 136 - 145 mmol/L    Potassium 4.0 3.5 - 5.1 mmol/L    Chloride 94 (L) 95 - 110 mmol/L    CO2 29 23 - 29 mmol/L    Glucose 118 (H) 70 - 110 mg/dL    BUN, Bld 16 6 - 20 mg/dL    Creatinine 1.5 (H) 0.5 - 1.4 mg/dL    Calcium 9.6 8.7 - 10.5 mg/dL    Total Protein 8.1 6.0 - 8.4 g/dL    Albumin 4.1 3.5 - 5.2 g/dL    Total Bilirubin 1.0 0.1 - 1.0 mg/dL    Alkaline Phosphatase 71 55 - 135 U/L    AST 26 10 - 40 U/L    ALT 24 10 - 44 U/L    Anion Gap 13 8 - 16  mmol/L    eGFR if African American >60 >60 mL/min/1.73 m^2    eGFR if non African American 54 (A) >60 mL/min/1.73 m^2   CPK   Result Value Ref Range     (H) 20 - 200 U/L   Lipase   Result Value Ref Range    Lipase 217 (H) 4 - 60 U/L   Troponin I   Result Value Ref Range    Troponin I 0.019 0.000 - 0.026 ng/mL   Urinalysis Microscopic   Result Value Ref Range    RBC, UA 10 (H) 0 - 4 /hpf    WBC, UA 2 0 - 5 /hpf    Bacteria Few (A) None-Occ /hpf    Hyaline Casts, UA 1 0-1/lpf /lpf    Microscopic Comment SEE COMMENT    Occult blood, other sources   Result Value Ref Range    Occult Blood Positive (A) Negative     Imaging Results:  Imaging Results          X-Ray Chest AP Portable (Final result)  Result time 12/30/19 12:43:00    Final result by JUAN PABLO Rogers Sr., MD (12/30/19 12:43:00)                 Impression:      1. The lungs are clear.  2. The size of the heart is prominent.  This may be secondary to magnification.  .      Electronically signed by: Griffin Rogers MD  Date:    12/30/2019  Time:    12:43             Narrative:    EXAMINATION:  XR CHEST AP PORTABLE    CLINICAL HISTORY:  chest pain;    COMPARISON:  None    FINDINGS:  The size of the heart is prominent.  The lungs are clear. There is no pneumothorax.  The costophrenic angles are sharp.                               The EKG was ordered, reviewed, and independently interpreted by the ED provider.  Interpretation time: 12:06  Rate: 61 BPM  Rhythm: normal sinus rhythm  Interpretation: LVH. Nonspecific T wave abnormality. No STEMI.           The Emergency Provider reviewed the vital signs and test results, which are outlined above.    ED Discussion     2:27 PM: Re-evaluated pt. Pt is resting comfortably and is in no acute distress. Pt states that his chest pain has improved at this time. D/w pt all pertinent results. D/w pt any concerns expressed at this time. Answered all questions. Pt expresses understanding at this time.    2:47 PM: Discussed  case with OMEGA Farooq ([facility]). Dr. Guthrie agrees with current care and management of pt and accepts admission.   Admitting Service: Hospital Medicine  Admitting Physician: Dr. Guthrie  Admit to: ICU    2:49 PM: Re-evaluated pt. I have discussed test results, shared treatment plan, and the need for admission with patient and family at bedside. Pt and family express understanding at this time and agree with all information. All questions answered. Pt and family have no further questions or concerns at this time. Pt is ready for admit.    ED Medication(s):  Medications   niCARdipine 40 mg/200 mL infusion (5 mg/hr Intravenous Rate/Dose Change 12/30/19 1515)   ondansetron injection 4 mg (4 mg Intravenous Given 12/30/19 1244)   nitroGLYCERIN 2% TD oint ointment 1 inch (1 inch Topical (Top) Given 12/30/19 1244)   hydrALAZINE injection 10 mg (10 mg Intravenous Given 12/30/19 1330)   cloNIDine tablet 0.3 mg (0.3 mg Oral Given 12/30/19 1433)          New Prescriptions    No medications on file         Medical Decision Making    Medical Decision Making:   Clinical Tests:   Lab Tests: Ordered and Reviewed  Radiological Study: Ordered and Reviewed  Medical Tests: Ordered and Reviewed           Scribe Attestation:   Scribe #1: I performed the above scribed service and the documentation accurately describes the services I performed. I attest to the accuracy of the note.    Attending:   Physician Attestation Statement for Scribe #1: I, Oumar Strange MD, personally performed the services described in this documentation, as scribed by Rashard Mir, in my presence, and it is both accurate and complete.          Clinical Impression       ICD-10-CM ICD-9-CM   1. Chest pain R07.9 786.50   2. Hypertensive emergency I16.1 401.9   3. Hematemesis with nausea K92.0 578.0     787.02       Disposition:   Disposition: Admitted  Condition: Serious         Oumar Strange MD  12/30/19 9337

## 2019-12-31 ENCOUNTER — PATIENT MESSAGE (OUTPATIENT)
Dept: PULMONOLOGY | Facility: CLINIC | Age: 49
End: 2019-12-31

## 2019-12-31 DIAGNOSIS — I70.1 RENAL ARTERY STENOSIS: ICD-10-CM

## 2019-12-31 PROBLEM — K92.0 HEMATEMESIS WITH NAUSEA: Status: RESOLVED | Noted: 2019-12-30 | Resolved: 2019-12-31

## 2019-12-31 PROBLEM — K92.0 HEMATEMESIS WITH NAUSEA: Status: ACTIVE | Noted: 2019-12-30

## 2019-12-31 PROBLEM — R07.9 CHEST PAIN: Status: RESOLVED | Noted: 2019-12-30 | Resolved: 2019-12-31

## 2019-12-31 PROBLEM — R79.89 ELEVATED TROPONIN: Status: ACTIVE | Noted: 2019-12-31

## 2019-12-31 LAB
ACTH PLAS-MCNC: 16 PG/ML (ref 0–46)
ANION GAP SERPL CALC-SCNC: 14 MMOL/L (ref 8–16)
BASOPHILS # BLD AUTO: 0.03 K/UL (ref 0–0.2)
BASOPHILS NFR BLD: 0.2 % (ref 0–1.9)
BUN SERPL-MCNC: 24 MG/DL (ref 6–20)
CALCIUM SERPL-MCNC: 8.8 MG/DL (ref 8.7–10.5)
CHLORIDE SERPL-SCNC: 98 MMOL/L (ref 95–110)
CO2 SERPL-SCNC: 25 MMOL/L (ref 23–29)
CREAT SERPL-MCNC: 1.7 MG/DL (ref 0.5–1.4)
DIASTOLIC DYSFUNCTION: NO
DIFFERENTIAL METHOD: ABNORMAL
EOSINOPHIL # BLD AUTO: 0.2 K/UL (ref 0–0.5)
EOSINOPHIL NFR BLD: 1.2 % (ref 0–8)
ERYTHROCYTE [DISTWIDTH] IN BLOOD BY AUTOMATED COUNT: 14.4 % (ref 11.5–14.5)
EST. GFR  (AFRICAN AMERICAN): 53 ML/MIN/1.73 M^2
EST. GFR  (NON AFRICAN AMERICAN): 46 ML/MIN/1.73 M^2
FSH SERPL-ACNC: 1 MIU/ML (ref 0.95–11.95)
GH SERPL-MCNC: <0.1 NG/ML (ref 0–3)
GLUCOSE SERPL-MCNC: 117 MG/DL (ref 70–110)
HCT VFR BLD AUTO: 50.8 % (ref 40–54)
HGB BLD-MCNC: 17.1 G/DL (ref 14–18)
HGB BLD-MCNC: 17.1 G/DL (ref 14–18)
HGB BLD-MCNC: 17.3 G/DL (ref 14–18)
IMM GRANULOCYTES # BLD AUTO: 0.06 K/UL (ref 0–0.04)
IMM GRANULOCYTES NFR BLD AUTO: 0.5 % (ref 0–0.5)
LH SERPL-ACNC: 0.4 MIU/ML (ref 0.6–12.1)
LYMPHOCYTES # BLD AUTO: 1.7 K/UL (ref 1–4.8)
LYMPHOCYTES NFR BLD: 12.7 % (ref 18–48)
MAGNESIUM SERPL-MCNC: 2.1 MG/DL (ref 1.6–2.6)
MCH RBC QN AUTO: 30.4 PG (ref 27–31)
MCHC RBC AUTO-ENTMCNC: 33.7 G/DL (ref 32–36)
MCV RBC AUTO: 90 FL (ref 82–98)
MONOCYTES # BLD AUTO: 1 K/UL (ref 0.3–1)
MONOCYTES NFR BLD: 7.7 % (ref 4–15)
NEUTROPHILS # BLD AUTO: 10.2 K/UL (ref 1.8–7.7)
NEUTROPHILS NFR BLD: 77.7 % (ref 38–73)
NRBC BLD-RTO: 0 /100 WBC
PHOSPHATE SERPL-MCNC: 4.4 MG/DL (ref 2.7–4.5)
PLATELET # BLD AUTO: 191 K/UL (ref 150–350)
PMV BLD AUTO: 10.4 FL (ref 9.2–12.9)
POTASSIUM SERPL-SCNC: 3.4 MMOL/L (ref 3.5–5.1)
PROLACTIN SERPL IA-MCNC: 1.9 NG/ML (ref 3.5–19.4)
RBC # BLD AUTO: 5.62 M/UL (ref 4.6–6.2)
RETIRED EF AND QEF - SEE NOTES: 60 (ref 55–65)
SODIUM SERPL-SCNC: 137 MMOL/L (ref 136–145)
TROPONIN I SERPL DL<=0.01 NG/ML-MCNC: 0.03 NG/ML (ref 0–0.03)
WBC # BLD AUTO: 13.17 K/UL (ref 3.9–12.7)

## 2019-12-31 PROCEDURE — 93306 2D ECHO WITH COLOR FLOW DOPPLER: ICD-10-PCS | Mod: 26,,, | Performed by: INTERNAL MEDICINE

## 2019-12-31 PROCEDURE — 99223 PR INITIAL HOSPITAL CARE,LEVL III: ICD-10-PCS | Mod: ,,, | Performed by: INTERNAL MEDICINE

## 2019-12-31 PROCEDURE — 25000003 PHARM REV CODE 250: Performed by: INTERNAL MEDICINE

## 2019-12-31 PROCEDURE — 99223 1ST HOSP IP/OBS HIGH 75: CPT | Mod: ,,, | Performed by: PHYSICIAN ASSISTANT

## 2019-12-31 PROCEDURE — 99223 PR INITIAL HOSPITAL CARE,LEVL III: ICD-10-PCS | Mod: ,,, | Performed by: PHYSICIAN ASSISTANT

## 2019-12-31 PROCEDURE — 80048 BASIC METABOLIC PNL TOTAL CA: CPT

## 2019-12-31 PROCEDURE — C9113 INJ PANTOPRAZOLE SODIUM, VIA: HCPCS | Performed by: INTERNAL MEDICINE

## 2019-12-31 PROCEDURE — 83735 ASSAY OF MAGNESIUM: CPT

## 2019-12-31 PROCEDURE — 94660 CPAP INITIATION&MGMT: CPT

## 2019-12-31 PROCEDURE — 84100 ASSAY OF PHOSPHORUS: CPT

## 2019-12-31 PROCEDURE — 93306 TTE W/DOPPLER COMPLETE: CPT | Mod: 26,,, | Performed by: INTERNAL MEDICINE

## 2019-12-31 PROCEDURE — 99255 PR INITIAL INPATIENT CONSULT,LEVL V: ICD-10-PCS | Mod: ,,, | Performed by: INTERNAL MEDICINE

## 2019-12-31 PROCEDURE — 63600175 PHARM REV CODE 636 W HCPCS: Performed by: INTERNAL MEDICINE

## 2019-12-31 PROCEDURE — 84484 ASSAY OF TROPONIN QUANT: CPT

## 2019-12-31 PROCEDURE — 63600175 PHARM REV CODE 636 W HCPCS: Performed by: NURSE PRACTITIONER

## 2019-12-31 PROCEDURE — 25000003 PHARM REV CODE 250: Performed by: NURSE PRACTITIONER

## 2019-12-31 PROCEDURE — 85025 COMPLETE CBC W/AUTO DIFF WBC: CPT

## 2019-12-31 PROCEDURE — 36415 COLL VENOUS BLD VENIPUNCTURE: CPT

## 2019-12-31 PROCEDURE — 21400001 HC TELEMETRY ROOM

## 2019-12-31 PROCEDURE — 93306 TTE W/DOPPLER COMPLETE: CPT

## 2019-12-31 PROCEDURE — 99255 IP/OBS CONSLTJ NEW/EST HI 80: CPT | Mod: ,,, | Performed by: INTERNAL MEDICINE

## 2019-12-31 PROCEDURE — 25000003 PHARM REV CODE 250: Performed by: PHYSICIAN ASSISTANT

## 2019-12-31 PROCEDURE — 99223 1ST HOSP IP/OBS HIGH 75: CPT | Mod: ,,, | Performed by: INTERNAL MEDICINE

## 2019-12-31 RX ORDER — BISACODYL 10 MG
10 SUPPOSITORY, RECTAL RECTAL DAILY PRN
Status: DISCONTINUED | OUTPATIENT
Start: 2019-12-31 | End: 2020-01-02 | Stop reason: HOSPADM

## 2019-12-31 RX ORDER — LABETALOL 200 MG/1
200 TABLET, FILM COATED ORAL EVERY 12 HOURS
Status: DISCONTINUED | OUTPATIENT
Start: 2019-12-31 | End: 2020-01-02 | Stop reason: HOSPADM

## 2019-12-31 RX ORDER — ONDANSETRON 2 MG/ML
4 INJECTION INTRAMUSCULAR; INTRAVENOUS EVERY 8 HOURS PRN
Status: DISCONTINUED | OUTPATIENT
Start: 2019-12-31 | End: 2020-01-02 | Stop reason: HOSPADM

## 2019-12-31 RX ORDER — HEPARIN SODIUM 5000 [USP'U]/ML
5000 INJECTION, SOLUTION INTRAVENOUS; SUBCUTANEOUS EVERY 8 HOURS
Status: DISCONTINUED | OUTPATIENT
Start: 2019-12-31 | End: 2019-12-31

## 2019-12-31 RX ORDER — CLONIDINE HYDROCHLORIDE 0.2 MG/1
0.2 TABLET ORAL EVERY 4 HOURS PRN
Status: DISCONTINUED | OUTPATIENT
Start: 2019-12-31 | End: 2020-01-02 | Stop reason: HOSPADM

## 2019-12-31 RX ORDER — PANTOPRAZOLE SODIUM 40 MG/1
40 TABLET, DELAYED RELEASE ORAL 2 TIMES DAILY
Status: DISCONTINUED | OUTPATIENT
Start: 2019-12-31 | End: 2020-01-02 | Stop reason: HOSPADM

## 2019-12-31 RX ORDER — PANTOPRAZOLE SODIUM 40 MG/1
40 TABLET, DELAYED RELEASE ORAL 2 TIMES DAILY
Status: DISCONTINUED | OUTPATIENT
Start: 2019-12-31 | End: 2019-12-31

## 2019-12-31 RX ORDER — CABERGOLINE 0.5 MG/1
0.5 TABLET ORAL
Status: DISCONTINUED | OUTPATIENT
Start: 2020-01-01 | End: 2020-01-02 | Stop reason: HOSPADM

## 2019-12-31 RX ORDER — DOCUSATE SODIUM 100 MG/1
100 CAPSULE, LIQUID FILLED ORAL DAILY
Status: DISCONTINUED | OUTPATIENT
Start: 2019-12-31 | End: 2020-01-02 | Stop reason: HOSPADM

## 2019-12-31 RX ORDER — HYDRALAZINE HYDROCHLORIDE 20 MG/ML
20 INJECTION INTRAMUSCULAR; INTRAVENOUS EVERY 4 HOURS PRN
Status: DISCONTINUED | OUTPATIENT
Start: 2019-12-31 | End: 2020-01-02 | Stop reason: HOSPADM

## 2019-12-31 RX ORDER — CABERGOLINE 0.5 MG/1
1 TABLET ORAL
Status: DISCONTINUED | OUTPATIENT
Start: 2020-01-06 | End: 2020-01-02 | Stop reason: HOSPADM

## 2019-12-31 RX ORDER — AMLODIPINE BESYLATE 10 MG/1
10 TABLET ORAL DAILY
Status: DISCONTINUED | OUTPATIENT
Start: 2019-12-31 | End: 2020-01-02 | Stop reason: HOSPADM

## 2019-12-31 RX ORDER — CABERGOLINE 0.5 MG/1
1 TABLET ORAL
Status: DISCONTINUED | OUTPATIENT
Start: 2020-01-03 | End: 2020-01-02 | Stop reason: HOSPADM

## 2019-12-31 RX ORDER — POTASSIUM CHLORIDE 20 MEQ/1
40 TABLET, EXTENDED RELEASE ORAL ONCE
Status: COMPLETED | OUTPATIENT
Start: 2019-12-31 | End: 2019-12-31

## 2019-12-31 RX ORDER — HYDROCHLOROTHIAZIDE 25 MG/1
25 TABLET ORAL DAILY
Status: DISCONTINUED | OUTPATIENT
Start: 2019-12-31 | End: 2020-01-02 | Stop reason: HOSPADM

## 2019-12-31 RX ORDER — PANTOPRAZOLE SODIUM 40 MG/1
40 TABLET, DELAYED RELEASE ORAL DAILY
Status: DISCONTINUED | OUTPATIENT
Start: 2019-12-31 | End: 2019-12-31

## 2019-12-31 RX ORDER — CABERGOLINE 0.5 MG/1
0.5 TABLET ORAL
Status: DISCONTINUED | OUTPATIENT
Start: 2019-12-31 | End: 2019-12-31

## 2019-12-31 RX ADMIN — AMLODIPINE BESYLATE 10 MG: 10 TABLET ORAL at 09:12

## 2019-12-31 RX ADMIN — ONDANSETRON 4 MG: 2 INJECTION INTRAMUSCULAR; INTRAVENOUS at 10:12

## 2019-12-31 RX ADMIN — POTASSIUM CHLORIDE 40 MEQ: 20 TABLET, EXTENDED RELEASE ORAL at 09:12

## 2019-12-31 RX ADMIN — HYDRALAZINE HYDROCHLORIDE 75 MG: 50 TABLET, FILM COATED ORAL at 09:12

## 2019-12-31 RX ADMIN — LABETALOL HYDROCHLORIDE 200 MG: 200 TABLET, FILM COATED ORAL at 07:12

## 2019-12-31 RX ADMIN — HYDROCHLOROTHIAZIDE 25 MG: 25 TABLET ORAL at 09:12

## 2019-12-31 RX ADMIN — PANTOPRAZOLE SODIUM 40 MG: 40 INJECTION, POWDER, FOR SOLUTION INTRAVENOUS at 05:12

## 2019-12-31 RX ADMIN — PROMETHAZINE HYDROCHLORIDE 25 MG: 25 INJECTION INTRAMUSCULAR; INTRAVENOUS at 09:12

## 2019-12-31 RX ADMIN — PANTOPRAZOLE SODIUM 40 MG: 40 TABLET, DELAYED RELEASE ORAL at 08:12

## 2019-12-31 RX ADMIN — HYDRALAZINE HYDROCHLORIDE 20 MG: 20 INJECTION INTRAMUSCULAR; INTRAVENOUS at 11:12

## 2019-12-31 RX ADMIN — LORAZEPAM 0.5 MG: 0.5 TABLET ORAL at 08:12

## 2019-12-31 RX ADMIN — ALUMINUM HYDROXIDE, MAGNESIUM HYDROXIDE, AND SIMETHICONE 50 ML: 200; 200; 20 SUSPENSION ORAL at 02:12

## 2019-12-31 RX ADMIN — PROMETHAZINE HYDROCHLORIDE 25 MG: 25 INJECTION INTRAMUSCULAR; INTRAVENOUS at 11:12

## 2019-12-31 RX ADMIN — LABETALOL HYDROCHLORIDE 200 MG: 200 TABLET, FILM COATED ORAL at 08:12

## 2019-12-31 RX ADMIN — ONDANSETRON 8 MG: 8 TABLET, ORALLY DISINTEGRATING ORAL at 10:12

## 2019-12-31 RX ADMIN — HYDRALAZINE HYDROCHLORIDE 75 MG: 50 TABLET, FILM COATED ORAL at 02:12

## 2019-12-31 RX ADMIN — HYDRALAZINE HYDROCHLORIDE 75 MG: 50 TABLET, FILM COATED ORAL at 07:12

## 2019-12-31 RX ADMIN — DOCUSATE SODIUM 100 MG: 100 CAPSULE, LIQUID FILLED ORAL at 09:12

## 2019-12-31 RX ADMIN — NICARDIPINE HYDROCHLORIDE 10 MG/HR: 0.2 INJECTION, SOLUTION INTRAVENOUS at 03:12

## 2019-12-31 NOTE — SUBJECTIVE & OBJECTIVE
Past Medical History:   Diagnosis Date    Hypogonadism, male     IGT (impaired glucose tolerance)     Obesity     Prolactinoma        History reviewed. No pertinent surgical history.    Review of patient's allergies indicates:  No Known Allergies    No current facility-administered medications on file prior to encounter.      Current Outpatient Medications on File Prior to Encounter   Medication Sig    cabergoline (DOSTINEX) 0.5 mg tablet Take 2 Tablets by mouth on Monday and Friday, and 1 tablet on wednesday    hydrALAZINE (APRESOLINE) 25 MG tablet Take 1 tablet (25 mg total) by mouth 3 (three) times daily.    sildenafil (REVATIO) 20 mg Tab Take 1-5 tablets as needed on an empty stomach with no alcohol an hour before desiring an erection.    testosterone cypionate (DEPOTESTOTERONE CYPIONATE) 200 mg/mL injection INJECT 1 ML INTO THE MUSCLE EVERY 21 DAYS     Family History     Problem Relation (Age of Onset)    Diabetes Father    Hypertension Father        Tobacco Use    Smoking status: Current Every Day Smoker     Packs/day: 1.00     Years: 20.00     Pack years: 20.00   Substance and Sexual Activity    Alcohol use: No     Frequency: 2-4 times a month     Drinks per session: 1 or 2     Binge frequency: Less than monthly    Drug use: No    Sexual activity: Yes     Partners: Female     Review of Systems   Constitution: Positive for malaise/fatigue.   HENT: Negative.    Eyes: Negative.    Cardiovascular: Negative.    Respiratory: Negative.    Endocrine: Negative.    Hematologic/Lymphatic: Negative.    Skin: Negative.    Musculoskeletal: Negative.    Gastrointestinal: Positive for nausea and vomiting.   Genitourinary: Negative.    Neurological: Positive for weakness.   Psychiatric/Behavioral: Negative.    Allergic/Immunologic: Negative.      Objective:     Vital Signs (Most Recent):  Temp: 97.8 °F (36.6 °C) (12/31/19 1245)  Pulse: 64 (12/31/19 1245)  Resp: 18 (12/31/19 1245)  BP: (!) 185/88 (12/31/19  1245)  SpO2: 97 % (12/31/19 1245) Vital Signs (24h Range):  Temp:  [97.8 °F (36.6 °C)-99.2 °F (37.3 °C)] 97.8 °F (36.6 °C)  Pulse:  [] 64  Resp:  [12-27] 18  SpO2:  [95 %-99 %] 97 %  BP: (127-195)/() 185/88     Weight: 112 kg (246 lb 14.6 oz)  Body mass index is 36.46 kg/m².    SpO2: 97 %  O2 Device (Oxygen Therapy): room air      Intake/Output Summary (Last 24 hours) at 12/31/2019 1418  Last data filed at 12/31/2019 1105  Gross per 24 hour   Intake --   Output 675 ml   Net -675 ml       Lines/Drains/Airways     Peripheral Intravenous Line                 Peripheral IV - Single Lumen 12/30/19 1220 20 G Right Antecubital 1 day         Peripheral IV - Single Lumen 12/30/19 1753 20 G Left Hand less than 1 day                Physical Exam   Constitutional: He is oriented to person, place, and time. He appears well-developed and well-nourished. No distress.   HENT:   Head: Normocephalic and atraumatic.   Eyes: Pupils are equal, round, and reactive to light. Right eye exhibits no discharge. Left eye exhibits no discharge.   Neck: Neck supple. No JVD present.   Cardiovascular: Normal rate, regular rhythm, S1 normal and S2 normal.   Murmur heard.  Pulmonary/Chest: Effort normal and breath sounds normal. No respiratory distress. He has no wheezes. He has no rales.   Abdominal: Soft. He exhibits no distension.   Musculoskeletal: He exhibits no edema.   Neurological: He is alert and oriented to person, place, and time.   Skin: Skin is warm and dry. He is not diaphoretic. No erythema.   Psychiatric: He has a normal mood and affect. His behavior is normal. Thought content normal.   Nursing note and vitals reviewed.      Significant Labs:   CMP   Recent Labs   Lab 12/30/19  1329 12/31/19  0347    137   K 4.0 3.4*   CL 94* 98   CO2 29 25   * 117*   BUN 16 24*   CREATININE 1.5* 1.7*   CALCIUM 9.6 8.8   PROT 8.1  --    ALBUMIN 4.1  --    BILITOT 1.0  --    ALKPHOS 71  --    AST 26  --    ALT 24  --     ANIONGAP 13 14   ESTGFRAFRICA >60 53*   EGFRNONAA 54* 46*   , CBC   Recent Labs   Lab 12/30/19  1220 12/30/19  1754 12/30/19  2359 12/31/19  0347   WBC 14.64*  --   --  13.17*   HGB 18.1* 17.8 17.3 17.1  17.1   HCT 53.7  --   --  50.8     --   --  191   , Troponin   Recent Labs   Lab 12/30/19  1329 12/30/19  1905 12/31/19  0113   TROPONINI 0.019 0.035* 0.033*   , All pertinent lab results from the last 24 hours have been reviewed. and   Recent Lab Results       12/31/19  0347   12/31/19  0113   12/30/19  2359   12/30/19  1905 12/30/19  1754        ACTH         16     Anion Gap 14             Baso # 0.03             Basophil% 0.2             BUN, Bld 24             Calcium 8.8             Chloride 98             CO2 25             Creatinine 1.7             Differential Method Automated             eGFR if  53             eGFR if non  46  Comment:  Calculation used to obtain the estimated glomerular filtration  rate (eGFR) is the CKD-EPI equation.                Eos # 0.2             Eosinophil% 1.2             FSH         1.00  Comment:  Female Reference Ranges:  Follicular Phase.................3.03-8.08 mIU/mL  Midcycle Peak....................2.55-16.69 mIU/mL  Luteal Phase.....................1.38-5.47 mIU/mL  Postmenopausal...................26..41 mIU/mL  Male Reference Range:............0.95-11.95 mIU/mL       Glucose 117             Gran # (ANC) 10.2             Gran% 77.7             Growth Hormone         <0.1  Comment:  For children, please refer to special pediatric ranges.     Hematocrit 50.8             Hemoglobin 17.1   17.3   17.8      17.1             Immature Grans (Abs) 0.06  Comment:  Mild elevation in immature granulocytes is non specific and   can be seen in a variety of conditions including stress response,   acute inflammation, trauma and pregnancy. Correlation with other   laboratory and clinical findings is essential.               Immature  Granulocytes 0.5             LH         0.4  Comment:  Female Reference Ranges:  Follicular phase.............1.8-11.8 mIU/mL  Midcycle phase...............7.6-89.1 mIU/mL  Luteal phase.................0.6-14.0 mIU/mL  Post-menopausal without HRT..5.2-62.0 mIU/mL  Male Reference Interval......0.6-12.1 mIU/mL       Lymph # 1.7             Lymph% 12.7             Magnesium 2.1             MCH 30.4             MCHC 33.7             MCV 90             Mono # 1.0             Mono% 7.7             MPV 10.4             nRBC 0             Occult Blood               Phosphorus 4.4             Platelets 191             Potassium 3.4             Prolactin         1.9     RBC 5.62             RDW 14.4             Sodium 137             Troponin I   0.033  Comment:  The reference interval for Troponin I represents the 99th percentile   cutoff   for our facility and is consistent with 3rd generation assay   performance.     0.035  Comment:  The reference interval for Troponin I represents the 99th percentile   cutoff   for our facility and is consistent with 3rd generation assay   performance.         TSH         1.528     WBC 13.17                              12/30/19  1453        ACTH       Anion Gap       Baso #       Basophil%       BUN, Bld       Calcium       Chloride       CO2       Creatinine       Differential Method       eGFR if        eGFR if non        Eos #       Eosinophil%       FSH       Glucose       Gran # (ANC)       Gran%       Growth Hormone       Hematocrit       Hemoglobin       Immature Grans (Abs)       Immature Granulocytes       LH       Lymph #       Lymph%       Magnesium       MCH       MCHC       MCV       Mono #       Mono%       MPV       nRBC       Occult Blood Positive     Phosphorus       Platelets       Potassium       Prolactin       RBC       RDW       Sodium       Troponin I       TSH       WBC             Significant Imaging: Echocardiogram: 2D echo with  color flow doppler: No results found for this or any previous visit., EKG: Reviewed and X-Ray: CXR: X-Ray Chest 1 View (CXR): No results found for this visit on 12/30/19. and X-Ray Chest PA and Lateral (CXR): No results found for this visit on 12/30/19.

## 2019-12-31 NOTE — CONSULTS
FRANKLIN communicated with Joleen at [facility] regarding order for CPAP.  Feroz in respiratory at [facility] stated that a RT will be here to set up CPAP between 11-12 today.

## 2019-12-31 NOTE — SUBJECTIVE & OBJECTIVE
Past Medical History:   Diagnosis Date    Hypogonadism, male     IGT (impaired glucose tolerance)     Obesity     Prolactinoma        History reviewed. No pertinent surgical history.    Review of patient's allergies indicates:  No Known Allergies  Family History     Problem Relation (Age of Onset)    Diabetes Father    Hypertension Father        Tobacco Use    Smoking status: Current Every Day Smoker     Packs/day: 1.00     Years: 20.00     Pack years: 20.00   Substance and Sexual Activity    Alcohol use: No     Frequency: 2-4 times a month     Drinks per session: 1 or 2     Binge frequency: Less than monthly    Drug use: No    Sexual activity: Yes     Partners: Female     Review of Systems   Constitutional: Negative for fatigue and fever.   HENT: Negative for hearing loss.    Eyes: Negative for visual disturbance.   Respiratory: Negative for cough and shortness of breath.    Cardiovascular: Negative for chest pain and palpitations.   Gastrointestinal:        As per HPI.   Genitourinary: Positive for difficulty urinating. Negative for dysuria, frequency and hematuria.   Musculoskeletal: Negative for arthralgias and back pain.   Skin: Negative for color change and rash.   Neurological: Negative for seizures, syncope, numbness and headaches.   Hematological: Does not bruise/bleed easily.   Psychiatric/Behavioral: The patient is nervous/anxious.      Objective:     Vital Signs (Most Recent):  Temp: 99.2 °F (37.3 °C) (12/31/19 0705)  Pulse: 77 (12/31/19 0715)  Resp: 15 (12/31/19 0715)  BP: (!) 158/78 (12/31/19 0715)  SpO2: 98 % (12/31/19 0715) Vital Signs (24h Range):  Temp:  [98.1 °F (36.7 °C)-99.2 °F (37.3 °C)] 99.2 °F (37.3 °C)  Pulse:  [] 77  Resp:  [12-27] 15  SpO2:  [95 %-100 %] 98 %  BP: (127-222)/() 158/78     Weight: 112 kg (246 lb 14.6 oz) (12/31/19 0600)  Body mass index is 36.46 kg/m².      Intake/Output Summary (Last 24 hours) at 12/31/2019 0858  Last data filed at 12/31/2019 0500  Gross  per 24 hour   Intake --   Output 325 ml   Net -325 ml       Lines/Drains/Airways     Peripheral Intravenous Line                 Peripheral IV - Single Lumen 12/30/19 1220 20 G Right Antecubital less than 1 day         Peripheral IV - Single Lumen 12/30/19 1753 20 G Left Hand less than 1 day                Physical Exam   Constitutional: He is oriented to person, place, and time. He appears well-developed and well-nourished.   HENT:   Head: Normocephalic and atraumatic.   Eyes: EOM are normal.   Neck: Normal range of motion. Neck supple.   Cardiovascular: Normal rate, regular rhythm and normal heart sounds.   No murmur heard.  Pulmonary/Chest: Effort normal and breath sounds normal. No respiratory distress. He has no wheezes.   Abdominal: Soft. Bowel sounds are normal. He exhibits no distension and no mass. There is no hepatomegaly. There is no tenderness.   Musculoskeletal: He exhibits no edema.   Neurological: He is alert and oriented to person, place, and time. No cranial nerve deficit. Gait normal.   Skin: Skin is warm and dry. No rash noted.   Psychiatric: He has a normal mood and affect.       Significant Labs:  CBC:   Recent Labs   Lab 12/30/19  1220 12/30/19  1754 12/30/19  2359 12/31/19  0347   WBC 14.64*  --   --  13.17*   HGB 18.1* 17.8 17.3 17.1  17.1   HCT 53.7  --   --  50.8     --   --  191       Significant Imaging:  Imaging results within the past 24 hours have been reviewed.

## 2019-12-31 NOTE — ASSESSMENT & PLAN NOTE
Atypical;  Trend troponins; initial troponin 0.019  ECG  Cardiac monitoring for ischemia    12/31; troponins slightly up in response to hypertensive urgency;  Will continue to monitor;    Moving to telemetry  Will ask cardiology to see; Cardiac Echo requested;

## 2019-12-31 NOTE — PLAN OF CARE
49 year old male presented with complaints of chest pain along with nausea and vomiting.  At present the pt is alert and oriented and reports and improvement in his symptoms.  The pt is independent with ADLs and does not require help at home.  He manages his own healthcare and intends to return home when medically cleared.  Case discussed with Dr. John Lopez who recommends CPAP and placed order for DME.  No other CM needs at this time.  CM provided a transitional care folder, information on advanced directives, information on pharmacy bedside delivery, and discharge planning begins on admission with contact information for any needs/questions.    D/C plan: home        12/31/19 1036   Discharge Assessment   Assessment Type Discharge Planning Assessment   Confirmed/corrected address and phone number on facesheet? Yes   Assessment information obtained from? Patient;Medical Record   Expected Length of Stay (days)   (2)   Communicated expected length of stay with patient/caregiver yes   Prior to hospitilization cognitive status: Alert/Oriented   Prior to hospitalization functional status: Independent   Current cognitive status: Alert/Oriented   Current Functional Status: Independent   Facility Arrived From: home    Lives With alone   Able to Return to Prior Arrangements yes   Is patient able to care for self after discharge? Yes   Who are your caregiver(s) and their phone number(s)? Abiola Rodriguez, mother: [phone]   Patient's perception of discharge disposition home or selfcare   Readmission Within the Last 30 Days no previous admission in last 30 days   Patient currently being followed by outpatient case management? No   Patient currently receives any other outside agency services? No   Equipment Currently Used at Home none   Do you have any problems affording any of your prescribed medications? No   Is the patient taking medications as prescribed? yes   Does the patient have transportation home? Yes   Transportation  Anticipated family or friend will provide   Does the patient receive services at the [facility]? No   Discharge Plan A Home   Discharge Plan B Home   DME Needed Upon Discharge  CPAP   Patient/Family in Agreement with Plan yes

## 2019-12-31 NOTE — SUBJECTIVE & OBJECTIVE
Interval History: Presenting with hypertensive urgency with N/V and GI bleed (felt julieta jeison tear); Off  Cardene now but continues with extremes of BP despite adding oral meds to the regimen;  Resumed Nausea this morning;     Review of Systems   Constitutional: Positive for chills. Negative for activity change, appetite change, diaphoresis, fatigue, fever and unexpected weight change.   HENT: Negative.    Eyes: Negative.    Respiratory: Negative for apnea, cough, choking, chest tightness, shortness of breath, wheezing and stridor.    Cardiovascular: Negative for chest pain, palpitations and leg swelling.   Gastrointestinal: Positive for nausea. Negative for abdominal distention.   Endocrine: Negative.    Genitourinary: Negative.    Musculoskeletal: Negative.    Skin: Negative.    Neurological: Negative for dizziness, seizures, syncope, facial asymmetry, numbness and headaches.   Psychiatric/Behavioral: Negative for agitation, behavioral problems and confusion.     Objective:     Vital Signs (Most Recent):  Temp: 98.3 °F (36.8 °C) (12/31/19 1105)  Pulse: 63 (12/31/19 1105)  Resp: 19 (12/31/19 1105)  BP: (!) 180/82 (12/31/19 1105)  SpO2: 98 % (12/31/19 1105) Vital Signs (24h Range):  Temp:  [98.2 °F (36.8 °C)-99.2 °F (37.3 °C)] 98.3 °F (36.8 °C)  Pulse:  [] 63  Resp:  [12-27] 19  SpO2:  [95 %-100 %] 98 %  BP: (127-222)/() 180/82     Weight: 112 kg (246 lb 14.6 oz)  Body mass index is 36.46 kg/m².    Intake/Output Summary (Last 24 hours) at 12/31/2019 1127  Last data filed at 12/31/2019 1105  Gross per 24 hour   Intake --   Output 675 ml   Net -675 ml      Physical Exam   Constitutional: He is oriented to person, place, and time. He appears well-developed and well-nourished. No distress.   HENT:   Head: Normocephalic and atraumatic.   Eyes: Pupils are equal, round, and reactive to light. EOM are normal.   Neck: Normal range of motion.   Cardiovascular: Normal rate and regular rhythm.   Pulmonary/Chest:  Effort normal and breath sounds normal. No respiratory distress. He has no wheezes.   Abdominal: Soft. Bowel sounds are normal. He exhibits no distension. There is no tenderness. There is no guarding.   Genitourinary:   Genitourinary Comments: deferred   Musculoskeletal: Normal range of motion. He exhibits no edema, tenderness or deformity.   Neurological: He is alert and oriented to person, place, and time. No cranial nerve deficit.   Skin: Skin is warm and dry. Capillary refill takes less than 2 seconds. He is not diaphoretic.   Psychiatric: He has a normal mood and affect. His behavior is normal. Judgment and thought content normal.       Significant Labs:   BMP:   Recent Labs   Lab 12/31/19  0347   *      K 3.4*   CL 98   CO2 25   BUN 24*   CREATININE 1.7*   CALCIUM 8.8   MG 2.1     CBC:   Recent Labs   Lab 12/30/19  1220 12/30/19  1754 12/30/19  2359 12/31/19  0347   WBC 14.64*  --   --  13.17*   HGB 18.1* 17.8 17.3 17.1  17.1   HCT 53.7  --   --  50.8     --   --  191     CMP:   Recent Labs   Lab 12/30/19  1329 12/31/19  0347    137   K 4.0 3.4*   CL 94* 98   CO2 29 25   * 117*   BUN 16 24*   CREATININE 1.5* 1.7*   CALCIUM 9.6 8.8   PROT 8.1  --    ALBUMIN 4.1  --    BILITOT 1.0  --    ALKPHOS 71  --    AST 26  --    ALT 24  --    ANIONGAP 13 14   EGFRNONAA 54* 46*     Cardiac Markers:   Recent Labs   Lab 12/30/19  1220   *     Coagulation:   Recent Labs   Lab 12/30/19  1220   INR 1.1   APTT 32.1*     Lactic Acid: No results for input(s): LACTATE in the last 48 hours.  Lipid Panel: No results for input(s): CHOL, HDL, LDLCALC, TRIG, CHOLHDL in the last 48 hours.  Magnesium:   Recent Labs   Lab 12/31/19  0347   MG 2.1     Troponin:   Recent Labs   Lab 12/30/19  1329 12/30/19  1905 12/31/19  0113   TROPONINI 0.019 0.035* 0.033*     TSH:   Recent Labs   Lab 12/30/19  1754   TSH 1.528       Significant Imaging:   Imaging Results          X-Ray Chest AP Portable (Final  result)  Result time 12/30/19 12:43:00    Final result by JUAN PABLO Rogers Sr., MD (12/30/19 12:43:00)                 Impression:      1. The lungs are clear.  2. The size of the heart is prominent.  This may be secondary to magnification.  .      Electronically signed by: Griffin Rogers MD  Date:    12/30/2019  Time:    12:43             Narrative:    EXAMINATION:  XR CHEST AP PORTABLE    CLINICAL HISTORY:  chest pain;    COMPARISON:  None    FINDINGS:  The size of the heart is prominent.  The lungs are clear. There is no pneumothorax.  The costophrenic angles are sharp.

## 2019-12-31 NOTE — ASSESSMENT & PLAN NOTE
Restart meds: AMLODIPINE 10 mg  CLONIDINE 0.2 mg PRN  HYDRALAZINE 75 mg Q8  LABATALOL 200mg Q12  HCTZ 25 mg Q24

## 2019-12-31 NOTE — NURSING
Patient up to floor from CT scan. Report received from SRIRAM Nathan. Cardiac monitor in place, VS stable. Patient resting comfortably, no complaints at this time. Will continue to monitor.

## 2019-12-31 NOTE — PLAN OF CARE
Patient awake, alert and oriented x 4. VS stable. Patient denies pain or SOB. Patient complains of N/V and GI upset. Patient on telemetry, NSR on the monitor. Patient ambulates independently. Fall precautions in place, room free of clutter. Patient free from fall/injury. Plan of care reviewed with patient. Patient has no questions at this time. Will continue to monitor.

## 2019-12-31 NOTE — HOSPITAL COURSE
12/31: seen and examined : Sleep study report reviewed: PHYSICIAN INTERPRETATION AND COMMENTS: Findings are consistent with moderate, positional obstructive  sleep apnea (JILLIAN). AHI was 20.0/hr with 4.8 hours sleep. SpO2 tayo was 87.8%. Treatment indicated with CPAP.

## 2019-12-31 NOTE — CONSULTS
[facility] -   Pulmonology  Consult Note    Patient Name: Grant Rodriguez  MRN: [id number]  Admission Date: 12/30/2019  Hospital Length of Stay: 1 days  Code Status: Full Code  Attending Physician: García Guthrie MD  Primary Care Provider: Fernando Jimenez MD   Principal Problem: Hypertensive urgency      Subjective:     HPI:  Grant Rodriguez is 49 y.o.   Asked to facilitate initiation on CPAP therapy pending DC home today. Had Sleep study ordered by Dr Jimenez: 12/04/2019  Admitted overnight in MICU for IV CARDENE to control BP, presented with chest pressure, 's, and vomiting, felt to be julieta yobani tear.  Als know JILLIAN recent sleep study +ve for JILLIAN, await CPAP.  Examined, currently off drips, No chest pain.  H &P reviewed:  50 YO AA male   He reports several retching episodes and noted vomiting of bright red blood; He has a history of recently elevated blood pressures  3 weeks  Ago he was told to monitor his BP 3 times a day and was given hydralazine for PRN use;  He never recorded his blood pressures; He also was recently  told of having obstructive sleep apnea; however is not on any therapy for this.  On presentation to the ED he complains of headaches and had systolic BP   In excess of 220 , prompting institution of a cardene drip and subsequent admission to the ICU; Patient is also on therapy  With Dostinex for a pituitary tumor   with prolactinoma, diagnosed several years ago;  He see an endocrinologist at [facility] in [place]. He also has impaired glucose tolerance and is overweight. Family history is notable for hypertension and diabetes      Past Medical History:   Diagnosis Date    Hypogonadism, male     IGT (impaired glucose tolerance)     Obesity     Prolactinoma        History reviewed. No pertinent surgical history.    Review of patient's allergies indicates:  No Known Allergies    Family History     Problem Relation (Age of Onset)    Diabetes Father    Hypertension  Father        Tobacco Use    Smoking status: Current Every Day Smoker     Packs/day: 1.00     Years: 20.00     Pack years: 20.00   Substance and Sexual Activity    Alcohol use: No     Frequency: 2-4 times a month     Drinks per session: 1 or 2     Binge frequency: Less than monthly    Drug use: No    Sexual activity: Yes     Partners: Female         Review of Systems   All other systems reviewed and are negative.    Objective:     Vital Signs (Most Recent):  Temp: 99.2 °F (37.3 °C) (12/31/19 0705)  Pulse: 77 (12/31/19 0715)  Resp: 15 (12/31/19 0715)  BP: (!) 158/78 (12/31/19 0715)  SpO2: 98 % (12/31/19 0715) Vital Signs (24h Range):  Temp:  [98.1 °F (36.7 °C)-99.2 °F (37.3 °C)] 99.2 °F (37.3 °C)  Pulse:  [] 77  Resp:  [12-27] 15  SpO2:  [95 %-100 %] 98 %  BP: (127-222)/() 158/78     Weight: 112 kg (246 lb 14.6 oz)  Body mass index is 36.46 kg/m².      Intake/Output Summary (Last 24 hours) at 12/31/2019 1040  Last data filed at 12/31/2019 0500  Gross per 24 hour   Intake --   Output 325 ml   Net -325 ml       Physical Exam   Constitutional: He is oriented to person, place, and time. He appears well-developed and well-nourished.   HENT:   Head: Normocephalic and atraumatic.   Nose: Nose normal.   Mouth/Throat: Oropharynx is clear and moist.   Eyes: Pupils are equal, round, and reactive to light. EOM are normal.   Neck: Normal range of motion. Neck supple.   Cardiovascular: Normal rate, regular rhythm and normal heart sounds.   Pulmonary/Chest: Effort normal and breath sounds normal. No stridor. No respiratory distress. He has no wheezes.   Abdominal: Soft. Bowel sounds are normal.   Musculoskeletal: Normal range of motion. He exhibits no edema.   Neurological: He is alert and oriented to person, place, and time. No cranial nerve deficit.   Skin: Skin is warm and dry. Capillary refill takes 2 to 3 seconds.   Psychiatric: He has a normal mood and affect.   Nursing note and vitals  reviewed.      Vents:  Oxygen Concentration (%): 21 (12/31/19 0315)    Lines/Drains/Airways     Peripheral Intravenous Line                 Peripheral IV - Single Lumen 12/30/19 1220 20 G Right Antecubital less than 1 day         Peripheral IV - Single Lumen 12/30/19 1753 20 G Left Hand less than 1 day                Significant Labs:    CBC/Anemia Profile:  Recent Labs   Lab 12/30/19  1220 12/30/19  1453 12/30/19  1754 12/30/19  2359 12/31/19  0347   WBC 14.64*  --   --   --  13.17*   HGB 18.1*  --  17.8 17.3 17.1  17.1   HCT 53.7  --   --   --  50.8     --   --   --  191   MCV 91  --   --   --  90   RDW 14.0  --   --   --  14.4   OCCULTBLOOD  --  Positive*  --   --   --         Chemistries:  Recent Labs   Lab 12/30/19  1329 12/31/19  0347    137   K 4.0 3.4*   CL 94* 98   CO2 29 25   BUN 16 24*   CREATININE 1.5* 1.7*   CALCIUM 9.6 8.8   ALBUMIN 4.1  --    PROT 8.1  --    BILITOT 1.0  --    ALKPHOS 71  --    ALT 24  --    AST 26  --    MG  --  2.1   PHOS  --  4.4       A1C: No results for input(s): HGBA1C in the last 48 hours.  ABGs: No results for input(s): PH, PCO2, HCO3, POCSATURATED, BE in the last 48 hours.  Cardiac Markers: No results for input(s): CKMB, TROPONINT, MYOGLOBIN in the last 48 hours.  Lactic Acid: No results for input(s): LACTATE in the last 48 hours.  POCT Glucose: No results for input(s): POCTGLUCOSE in the last 48 hours.  Troponin:   Recent Labs   Lab 12/30/19  1329 12/30/19  1905 12/31/19  0113   TROPONINI 0.019 0.035* 0.033*     All pertinent labs within the past 24 hours have been reviewed.    Significant Imaging:   I have reviewed and interpreted all pertinent imaging results/findings within the past 24 hours.     Sleep study    PHYSICIAN INTERPRETATION AND COMMENTS: Findings are consistent with moderate, positional obstructive  sleep apnea (JILLIAN). AHI was 20.0/hr with 4.8 hours sleep. SpO2 tayo was 87.8%. Treatment indicated with CPAP. Please  refer to sleep disorders clinic   for prompt evaluation and management. AutoPAP 5-20 cmwp with mask of choice.  CLINICAL HISTORY: 49 year old male presented with: Snoring and fragmented sleep. 16 inch neck, BMI of 38, an  Bancroft sleepiness score of 6, no co-morbidities and symptoms of nocturnal snoring and witnessed apneas. Based on the  clinical history, the patient has a high pre-test probability of having severe JILLIAN.  SLEEP STUDY FINDINGS: Patient underwent a one night Home Sleep Test and by behavioral criteria, slept for  approximately 4.8 hours, with a sleep latency of 3 minutes and a sleep efficiency of 72.7%. Moderate sleep disordered  breathing (AHI=20) is noted based on a 4% hypopnea desaturation criteria, predominantly in the supine position (22  events/hour). The patient slept supine 88.7% of the night based on valid recording time of 4.8 hours and is 5.5 times as likely to  have apneas/hypopneas when supine. When considering more subtle measures of sleep disordered breathing, the overall  respiratory disturbance index is also moderate (RDI=38) based on a 1% hypopnea desaturation criteria with confirmation by  surrogate arousal indicators. The apneas/hypopneas are accompanied by minimal oxygen desaturation (percent time below 90%  SpO2: 0.3%, Min SpO2: 87.8%). The average desaturation across all sleep disordered breathing events is 2.9%. Snoring  occurs for 31.9% (30 dB) of the study, 3.7% is very loud. The mean pulse rate is 54 BPM.  TREATMENT CONSIDERATIONS: Consider nasal continuous positive airway pressure (CPAP/AutoPAP) as the initial  treatment choice based on the AHI severity and co-morbidities. A mandibular advancement splint (MAS) or referral to an  ENT surgeon for modification to the airway should be considered to reduce the potential contribution of JILLIAN on existing  diseases if the patient prefers an alternative therapy or the CPAP trial is unsuccessful. A Mandibular Advancement Splint  (MAS) will likely provide  treatment benefit independent of JILLIAN severity. The patient should avoid sleeping supine given  non-supine AHI is in the normal range.  DISEASE MANAGEMENT CONSIDERATIONS: None.      ABG  No results for input(s): PH, PO2, PCO2, HCO3, BE in the last 168 hours.  Assessment/Plan:     * Hypertensive urgency  Restart meds: AMLODIPINE 10 mg  CLONIDINE 0.2 mg PRN  HYDRALAZINE 75 mg Q8  LABATALOL 200mg Q12  HCTZ 25 mg Q24    JILLIAN (obstructive sleep apnea)  Goals of CPAP discussed  APAP ordered  Need to follow in 6 weeks sleep clinic    Hematemesis with nausea  Seen by GI  Need out patient EGD      Renal/  KCL replacement          Thank you for your consult. I will sign off. Please contact us if you have any additional questions.     Lamont Rick MD  Pulmonology  [facility]

## 2019-12-31 NOTE — CONSULTS
[facility] -   Gastroenterology  Consult Note    Patient Name: Grant Rodriguez  MRN: [id number]  Admission Date: 12/30/2019  Hospital Length of Stay: 1 days  Code Status: Full Code   Attending Provider: García Guthrie MD   Consulting Provider: Abhijeet Santiago PA-C  Primary Care Physician: Fernando Jimenez MD  Principal Problem:Hypertensive urgency    Inpatient consult to Gastroenterology  Consult performed by: Abhijeet Santiago PA-C  Consult ordered by: García Guthrie MD  Reason for consult: Possible MW tear; hematemesis        Subjective:     HPI:  The patient presented to the ER for elevated blood pressure. He had gone to [facility] for vomiting and was noted to have a BP of 210/104. He was admitted to ICU and started on Cardizem drip. We have been consulted for vomiting blood. The patient reported heavy drinking three days ago. The following day, he developed nausea and non-bloody emesis. However, later that afternoon, he vomited bright red blood. This is what led to the outpatient clinic visit. The patient hasn't vomited since admit. His Hgb today is 17.3. BUN was normal on admit. Lipase 217. Patient denies abdominal pain, melena or hematochezia. He reports taking NSAIDs this week. He has no history of PUD.     Past Medical History:   Diagnosis Date    Hypogonadism, male     IGT (impaired glucose tolerance)     Obesity     Prolactinoma        History reviewed. No pertinent surgical history.    Review of patient's allergies indicates:  No Known Allergies  Family History     Problem Relation (Age of Onset)    Diabetes Father    Hypertension Father        Tobacco Use    Smoking status: Current Every Day Smoker     Packs/day: 1.00     Years: 20.00     Pack years: 20.00   Substance and Sexual Activity    Alcohol use: No     Frequency: 2-4 times a month     Drinks per session: 1 or 2     Binge frequency: Less than monthly    Drug use: No    Sexual activity: Yes     Partners: Female      Review of Systems   Constitutional: Negative for fatigue and fever.   HENT: Negative for hearing loss.    Eyes: Negative for visual disturbance.   Respiratory: Negative for cough and shortness of breath.    Cardiovascular: Negative for chest pain and palpitations.   Gastrointestinal:        As per HPI.   Genitourinary: Positive for difficulty urinating. Negative for dysuria, frequency and hematuria.   Musculoskeletal: Negative for arthralgias and back pain.   Skin: Negative for color change and rash.   Neurological: Negative for seizures, syncope, numbness and headaches.   Hematological: Does not bruise/bleed easily.   Psychiatric/Behavioral: The patient is nervous/anxious.      Objective:     Vital Signs (Most Recent):  Temp: 99.2 °F (37.3 °C) (12/31/19 0705)  Pulse: 77 (12/31/19 0715)  Resp: 15 (12/31/19 0715)  BP: (!) 158/78 (12/31/19 0715)  SpO2: 98 % (12/31/19 0715) Vital Signs (24h Range):  Temp:  [98.1 °F (36.7 °C)-99.2 °F (37.3 °C)] 99.2 °F (37.3 °C)  Pulse:  [] 77  Resp:  [12-27] 15  SpO2:  [95 %-100 %] 98 %  BP: (127-222)/() 158/78     Weight: 112 kg (246 lb 14.6 oz) (12/31/19 0600)  Body mass index is 36.46 kg/m².      Intake/Output Summary (Last 24 hours) at 12/31/2019 0845  Last data filed at 12/31/2019 0500  Gross per 24 hour   Intake --   Output 325 ml   Net -325 ml       Lines/Drains/Airways     Peripheral Intravenous Line                 Peripheral IV - Single Lumen 12/30/19 1220 20 G Right Antecubital less than 1 day         Peripheral IV - Single Lumen 12/30/19 1753 20 G Left Hand less than 1 day                Physical Exam   Constitutional: He is oriented to person, place, and time. He appears well-developed and well-nourished.   HENT:   Head: Normocephalic and atraumatic.   Eyes: EOM are normal.   Neck: Normal range of motion. Neck supple.   Cardiovascular: Normal rate, regular rhythm and normal heart sounds.   No murmur heard.  Pulmonary/Chest: Effort normal and breath sounds  normal. No respiratory distress. He has no wheezes.   Abdominal: Soft. Bowel sounds are normal. He exhibits no distension and no mass. There is no hepatomegaly. There is no tenderness.   Musculoskeletal: He exhibits no edema.   Neurological: He is alert and oriented to person, place, and time. No cranial nerve deficit. Gait normal.   Skin: Skin is warm and dry. No rash noted.   Psychiatric: He has a normal mood and affect.       Significant Labs:  CBC:   Recent Labs   Lab 12/30/19  1220 12/30/19  1754 12/30/19  2359 12/31/19  0347   WBC 14.64*  --   --  13.17*   HGB 18.1* 17.8 17.3 17.1  17.1   HCT 53.7  --   --  50.8     --   --  191       Significant Imaging:  Imaging results within the past 24 hours have been reviewed.    Assessment/Plan:     * Hypertensive urgency  Patient is now off the drip and on oral meds. Additional management per  team.     Hematemesis with nausea  Most likely etiology is esophagitis. Hgb stable and no additional bleeding so no plans for EGD at this time. May consider as outpatient in 8 weeks. Follow up with GI in clinic.   Recommend oral PPI bid now and at discharge.   Discontinue frequent H/H. Ok to monitor daily since stable.   Start clear liquids and advance as tolerated.   Re-consult GI as needed.           Thank you for your consult. I will sign off. Please contact us if you have any additional questions.    Abhijeet Santiago PA-C  Gastroenterology  [facility]

## 2019-12-31 NOTE — ASSESSMENT & PLAN NOTE
N/V,  rethching in setting of hypertensive urgency  Will place on clear liquids for now  PPI therapy;  Consult GI;   Monitor H/H    12/31:  No further episodes reported;  GI would like to pursue evaluation as outpatient; Hgb remaining stable;

## 2019-12-31 NOTE — NURSING
Transferred to CT by radiology personnel with cardiac monitor via wheelchair at 1215.     1225 Called report to to SRIRAM Goldberg, nurse assuming care for patient on telemetry. Patient will be transported from CT to telemetry room 242 with cardiac monitoring in place.

## 2019-12-31 NOTE — HPI
Mr. Rodriguez is a 49 year old male patient whose current medical conditions include prolactinoma (on dostinex), tobacco abuse, JILLIAN, and recently diagnosed HTN who was sent to [facility] ED from primary care clinic yesterday due to uncontrolled HTN. Patient complained of generalized malaise/fatigue along with severe nausea and multiple episodes of emesis, some blood-tinged,  over the past 48 hours. He denied any associated abdominal pain, chest pain, SOB, palpitations, near syncope, or syncope. Initial workup in ED revealed markedly elevated /110 and patient was subsequently placed on cardene drip and admitted for further evaluation and treatment. Cardiology consulted to assist with management. Patient seen and examined today, lying in bed. Still feels poorly. Complains of nausea/GI upset. Remains chest pain free. States he was only recently diagnosed with elevated BP and was given hydralazine to use on prn basis and only took one dose prior to his admission. No prior cardiac history. Chart reviewed. Troponin 0.019>0.035>0.033. EKG reviewed and showed SR with non-specific T wave abnormality, LVH. 2D echo pending.    Of note, patient recently diagnosed with JILLIAN.

## 2019-12-31 NOTE — PROGRESS NOTES
e-ICU admit note      Reason for ICU admission:       HPI:    48 yo male presents with 2 day hx N/V  Vomiting today included blood.      In  ED BP was 213/110 accompanied by H> cardene drip started        PHx:    JILLIAN  HTN  Pituitary tumor- prolactinoma ( on  Dostinex )  Hypogonadism  DM      Home Meds:    Hydralazine 25mg tid  Sildenafil 20 mg prn  zofran 8 mg  Testosterone cypionate 1 ml inj q 21 days  Cagergoline ( Dostinex) 0.5 mg 2 tabs Mon and Fri, I tab Wed        Significant labs, images:    WBC 18525    Troponin 0.019--->0.035  Stool + occult blood  olena screen :THC presumed positive    CXR   cardiomegaly, clear lungs      ECG    NSR  LVH no acute changes          I viewed the pt via camera at   9:25 pm    Awake, alert    80 sinus, 99%, 171/90, RR 28    Nicardipine infusion ongoing    NAD              Assessment/Plan      Atypical Chest pain  Trend troponins  ECG LVH only  Cardiac monitoring for ischemia  Repeat ECG at 9:35 pm still with no acute changes      Leukocytosis  No fever but chills in the days preceeding admission  Will culture broadly  for temp spikes;  Trend labs.        JILLIAN (obstructive sleep apnea)  CPAP overnight           Hematemesis  N/V,  rethching in setting of hypertensive   PPI therapy;  Consult GI;   consider julieta-Rajan  Monitor H/H        Hypertensive urgency  Newly diagnosed  Work up for secondary causes  Continue cardene infusion     DVT ppx- SCD

## 2019-12-31 NOTE — HPI
Grant Rodriguez is 49 y.o.   Asked to facilitate initiation on CPAP therapy pending DC home today. Had Sleep study ordered by Dr Jimenez: 12/04/2019  Admitted overnight in MICU for IV CARDENE to control BP, presented with chest pressure, 's, and vomiting, felt to be julieta yobani tear.  Als know JILLIAN recent sleep study +ve for JILLIAN, await CPAP.  Examined, currently off drips, No chest pain.  H &P reviewed:  50 YO AA male   He reports several retching episodes and noted vomiting of bright red blood; He has a history of recently elevated blood pressures  3 weeks  Ago he was told to monitor his BP 3 times a day and was given hydralazine for PRN use;  He never recorded his blood pressures; He also was recently  told of having obstructive sleep apnea; however is not on any therapy for this.  On presentation to the ED he complains of headaches and had systolic BP   In excess of 220 , prompting institution of a cardene drip and subsequent admission to the ICU; Patient is also on therapy  With Dostinex for a pituitary tumor   with prolactinoma, diagnosed several years ago;  He see an endocrinologist at [facility] in [place]. He also has impaired glucose tolerance and is overweight.  Family history is notable for hypertension and diabetes

## 2019-12-31 NOTE — ASSESSMENT & PLAN NOTE
Newly diagnosed hypertension  Must exclude secondary causes RVH; OSAS; Endocrine hypertension;   Will check pituitary axis (FSH, LH, ACTH, TSH; GH, Prolactin)  Aldosterone/renin ratio  Plasma free metanephrines  Admitted to ICU; Continue cardene with institution of an oral regimen pending course.    12/31:  Continue lability of the BP off the cardene;  Started on oral Norvasc; Hydralazine; HCTZ and labetolol  Suspect secondary hypertension; have requested a CT abdomen -stone protocol to get a look at kidneys and adrenals;   Awaiting Parker/renin ratio and plasma free metanephrines together with pituitary axis hormones;   Continue to monitor renal function and lytes;

## 2019-12-31 NOTE — ASSESSMENT & PLAN NOTE
Elevated on admission; question reactive; Complained of chills in the days preceeding admission  Will culture broadly  for temp spikes;  Trend labs.    12/31;  Trending downward;

## 2019-12-31 NOTE — CONSULTS
[facility]  Cardiology  Consult Note    Patient Name: Grant Rodriguez  MRN: [id number]  Admission Date: 12/30/2019  Hospital Length of Stay: 1 days  Code Status: Full Code   Attending Provider: García Guthrie MD   Consulting Provider: Teresita Booker PA-C  Primary Care Physician: Fernando Jimenez MD  Principal Problem:Hypertensive urgency    Patient information was obtained from patient, past medical records and ER records.     Inpatient consult to Cardiology  Consult performed by: Teresita Booker PA-C  Consult ordered by: García Guthrie MD        Subjective:     Chief Complaint:  HTN     HPI:   Mr. Rodriguez is a 49 year old male patient whose current medical conditions include prolactinoma (on dostinex), tobacco abuse, JILLIAN, and recently diagnosed HTN who was sent to [facility] ED from primary care clinic yesterday due to uncontrolled HTN. Patient complained of generalized malaise/fatigue along with severe nausea and multiple episodes of emesis, some blood-tinged,  over the past 48 hours. He denied any associated abdominal pain, chest pain, SOB, palpitations, near syncope, or syncope. Initial workup in ED revealed markedly elevated /110 and patient was subsequently placed on cardene drip and admitted for further evaluation and treatment. Cardiology consulted to assist with management. Patient seen and examined today, lying in bed. Still feels poorly. Complains of nausea/GI upset. Remains chest pain free. States he was only recently diagnosed with elevated BP and was given hydralazine to use on prn basis and only took one dose prior to his admission. No prior cardiac history. Chart reviewed. Troponin 0.019>0.035>0.033. EKG reviewed and showed SR with non-specific T wave abnormality, LVH. 2D echo pending.    Of note, patient recently diagnosed with JILLIAN.     Past Medical History:   Diagnosis Date    Hypogonadism, male     IGT (impaired glucose tolerance)     Obesity     Prolactinoma         History reviewed. No pertinent surgical history.    Review of patient's allergies indicates:  No Known Allergies    No current facility-administered medications on file prior to encounter.      Current Outpatient Medications on File Prior to Encounter   Medication Sig    cabergoline (DOSTINEX) 0.5 mg tablet Take 2 Tablets by mouth on Monday and Friday, and 1 tablet on wednesday    hydrALAZINE (APRESOLINE) 25 MG tablet Take 1 tablet (25 mg total) by mouth 3 (three) times daily.    sildenafil (REVATIO) 20 mg Tab Take 1-5 tablets as needed on an empty stomach with no alcohol an hour before desiring an erection.    testosterone cypionate (DEPOTESTOTERONE CYPIONATE) 200 mg/mL injection INJECT 1 ML INTO THE MUSCLE EVERY 21 DAYS     Family History     Problem Relation (Age of Onset)    Diabetes Father    Hypertension Father        Tobacco Use    Smoking status: Current Every Day Smoker     Packs/day: 1.00     Years: 20.00     Pack years: 20.00   Substance and Sexual Activity    Alcohol use: No     Frequency: 2-4 times a month     Drinks per session: 1 or 2     Binge frequency: Less than monthly    Drug use: No    Sexual activity: Yes     Partners: Female     Review of Systems   Constitution: Positive for malaise/fatigue.   HENT: Negative.    Eyes: Negative.    Cardiovascular: Negative.    Respiratory: Negative.    Endocrine: Negative.    Hematologic/Lymphatic: Negative.    Skin: Negative.    Musculoskeletal: Negative.    Gastrointestinal: Positive for nausea and vomiting.   Genitourinary: Negative.    Neurological: Positive for weakness.   Psychiatric/Behavioral: Negative.    Allergic/Immunologic: Negative.      Objective:     Vital Signs (Most Recent):  Temp: 97.8 °F (36.6 °C) (12/31/19 1245)  Pulse: 64 (12/31/19 1245)  Resp: 18 (12/31/19 1245)  BP: (!) 185/88 (12/31/19 1245)  SpO2: 97 % (12/31/19 1245) Vital Signs (24h Range):  Temp:  [97.8 °F (36.6 °C)-99.2 °F (37.3 °C)] 97.8 °F (36.6 °C)  Pulse:  []  64  Resp:  [12-27] 18  SpO2:  [95 %-99 %] 97 %  BP: (127-195)/() 185/88     Weight: 112 kg (246 lb 14.6 oz)  Body mass index is 36.46 kg/m².    SpO2: 97 %  O2 Device (Oxygen Therapy): room air      Intake/Output Summary (Last 24 hours) at 12/31/2019 1418  Last data filed at 12/31/2019 1105  Gross per 24 hour   Intake --   Output 675 ml   Net -675 ml       Lines/Drains/Airways     Peripheral Intravenous Line                 Peripheral IV - Single Lumen 12/30/19 1220 20 G Right Antecubital 1 day         Peripheral IV - Single Lumen 12/30/19 1753 20 G Left Hand less than 1 day                Physical Exam   Constitutional: He is oriented to person, place, and time. He appears well-developed and well-nourished. No distress.   HENT:   Head: Normocephalic and atraumatic.   Eyes: Pupils are equal, round, and reactive to light. Right eye exhibits no discharge. Left eye exhibits no discharge.   Neck: Neck supple. No JVD present.   Cardiovascular: Normal rate, regular rhythm, S1 normal and S2 normal.   Murmur heard.  Pulmonary/Chest: Effort normal and breath sounds normal. No respiratory distress. He has no wheezes. He has no rales.   Abdominal: Soft. He exhibits no distension.   Musculoskeletal: He exhibits no edema.   Neurological: He is alert and oriented to person, place, and time.   Skin: Skin is warm and dry. He is not diaphoretic. No erythema.   Psychiatric: He has a normal mood and affect. His behavior is normal. Thought content normal.   Nursing note and vitals reviewed.      Significant Labs:   CMP   Recent Labs   Lab 12/30/19  1329 12/31/19  0347    137   K 4.0 3.4*   CL 94* 98   CO2 29 25   * 117*   BUN 16 24*   CREATININE 1.5* 1.7*   CALCIUM 9.6 8.8   PROT 8.1  --    ALBUMIN 4.1  --    BILITOT 1.0  --    ALKPHOS 71  --    AST 26  --    ALT 24  --    ANIONGAP 13 14   ESTGFRAFRICA >60 53*   EGFRNONAA 54* 46*   , CBC   Recent Labs   Lab 12/30/19  1220 12/30/19  1754 12/30/19  2359 12/31/19  0349    WBC 14.64*  --   --  13.17*   HGB 18.1* 17.8 17.3 17.1  17.1   HCT 53.7  --   --  50.8     --   --  191   , Troponin   Recent Labs   Lab 12/30/19  1329 12/30/19  1905 12/31/19  0113   TROPONINI 0.019 0.035* 0.033*   , All pertinent lab results from the last 24 hours have been reviewed. and   Recent Lab Results       12/31/19  0347   12/31/19  0113   12/30/19  2359   12/30/19  1905   12/30/19  1754        ACTH         16     Anion Gap 14             Baso # 0.03             Basophil% 0.2             BUN, Bld 24             Calcium 8.8             Chloride 98             CO2 25             Creatinine 1.7             Differential Method Automated             eGFR if  53             eGFR if non  46  Comment:  Calculation used to obtain the estimated glomerular filtration  rate (eGFR) is the CKD-EPI equation.                Eos # 0.2             Eosinophil% 1.2             FSH         1.00  Comment:  Female Reference Ranges:  Follicular Phase.................3.03-8.08 mIU/mL  Midcycle Peak....................2.55-16.69 mIU/mL  Luteal Phase.....................1.38-5.47 mIU/mL  Postmenopausal...................26..41 mIU/mL  Male Reference Range:............0.95-11.95 mIU/mL       Glucose 117             Gran # (ANC) 10.2             Gran% 77.7             Growth Hormone         <0.1  Comment:  For children, please refer to special pediatric ranges.     Hematocrit 50.8             Hemoglobin 17.1   17.3   17.8      17.1             Immature Grans (Abs) 0.06  Comment:  Mild elevation in immature granulocytes is non specific and   can be seen in a variety of conditions including stress response,   acute inflammation, trauma and pregnancy. Correlation with other   laboratory and clinical findings is essential.               Immature Granulocytes 0.5             LH         0.4  Comment:  Female Reference Ranges:  Follicular phase.............1.8-11.8 mIU/mL  Midcycle  phase...............7.6-89.1 mIU/mL  Luteal phase.................0.6-14.0 mIU/mL  Post-menopausal without HRT..5.2-62.0 mIU/mL  Male Reference Interval......0.6-12.1 mIU/mL       Lymph # 1.7             Lymph% 12.7             Magnesium 2.1             MCH 30.4             MCHC 33.7             MCV 90             Mono # 1.0             Mono% 7.7             MPV 10.4             nRBC 0             Occult Blood               Phosphorus 4.4             Platelets 191             Potassium 3.4             Prolactin         1.9     RBC 5.62             RDW 14.4             Sodium 137             Troponin I   0.033  Comment:  The reference interval for Troponin I represents the 99th percentile   cutoff   for our facility and is consistent with 3rd generation assay   performance.     0.035  Comment:  The reference interval for Troponin I represents the 99th percentile   cutoff   for our facility and is consistent with 3rd generation assay   performance.         TSH         1.528     WBC 13.17                              12/30/19  1453        ACTH       Anion Gap       Baso #       Basophil%       BUN, Bld       Calcium       Chloride       CO2       Creatinine       Differential Method       eGFR if        eGFR if non        Eos #       Eosinophil%       FSH       Glucose       Gran # (ANC)       Gran%       Growth Hormone       Hematocrit       Hemoglobin       Immature Grans (Abs)       Immature Granulocytes       LH       Lymph #       Lymph%       Magnesium       MCH       MCHC       MCV       Mono #       Mono%       MPV       nRBC       Occult Blood Positive     Phosphorus       Platelets       Potassium       Prolactin       RBC       RDW       Sodium       Troponin I       TSH       WBC             Significant Imaging: Echocardiogram: 2D echo with color flow doppler: No results found for this or any previous visit., EKG: Reviewed and X-Ray: CXR: X-Ray Chest 1 View (CXR): No results  found for this visit on 12/30/19. and X-Ray Chest PA and Lateral (CXR): No results found for this visit on 12/30/19.    Assessment and Plan:   Patient who presents with HTN urgency. BP still above goal. Agree with current meds, titrate as needed. Agree with secondary HTN workup. Check renal U/S to rule out LIAM. Recommend MPI stress test as OP.    * Hypertensive urgency  -BP markedly elevated upon admission  -Still above goal  -Continue amlodipine, labetalol, hydralazine, HCTZ  -Titrate as needed  -Agree with secondary HTN workup  -Check renal U/S to rule out LIAM  -Counseled on low salt diet      Elevated troponin  -Troponin 0.019>0.035>0.033  -Mild elevation likely secondary to demand ischemia from uncontrolled HTN  -Consider ASA 81 mg daily if no further issues with hematemesis  -Continue amlodipine, BB, HCTZ  -Check 2D echo  -MPI stress test as OP once BP is controlled    JILLIAN (obstructive sleep apnea)  -Contributing to elevated BP  -Nightly CPAP usage    Prolactinoma  -Mgmt as per hospital medicine        VTE Risk Mitigation (From admission, onward)         Ordered     Place sequential compression device  Until discontinued      12/31/19 0913     Place sequential compression device  Until discontinued      12/30/19 1547     Place GERDA hose  Until discontinued      12/30/19 1547     Place sequential compression device  Until discontinued      12/30/19 1547                Thank you for your consult. I will follow-up with patient. Please contact us if you have any additional questions.    Teresita Booker PA-C  Cardiology   [facility]

## 2019-12-31 NOTE — ASSESSMENT & PLAN NOTE
-Troponin 0.019>0.035>0.033  -Mild elevation likely secondary to demand ischemia from uncontrolled HTN  -Consider ASA 81 mg daily if no further issues with hematemesis  -Continue amlodipine, BB, HCTZ  -Check 2D echo  -MPI stress test as OP once BP is controlled

## 2019-12-31 NOTE — ASSESSMENT & PLAN NOTE
Most likely etiology is esophagitis. Hgb stable and no additional bleeding so no plans for EGD at this time. May consider as outpatient in 8 weeks. Follow up with GI in clinic.   Recommend oral PPI bid now and at discharge.   Discontinue frequent H/H. Ok to monitor daily since stable.   Start clear liquids and advance as tolerated.   Re-consult GI as needed.

## 2019-12-31 NOTE — ASSESSMENT & PLAN NOTE
-BP markedly elevated upon admission  -Still above goal  -Continue amlodipine, labetalol, hydralazine, HCTZ  -Titrate as needed  -Agree with secondary HTN workup  -Check renal U/S to rule out LIAM  -Counseled on low salt diet

## 2019-12-31 NOTE — PLAN OF CARE
Problem: Fall Injury Risk  Goal: Absence of Fall and Fall-Related Injury  Outcome: Ongoing, Progressing     Problem: Adult Inpatient Plan of Care  Goal: Plan of Care Review  Outcome: Ongoing, Progressing  Goal: Patient-Specific Goal (Individualization)  Outcome: Ongoing, Progressing  Goal: Absence of Hospital-Acquired Illness or Injury  Outcome: Ongoing, Progressing  Goal: Optimal Comfort and Wellbeing  Outcome: Ongoing, Progressing  Goal: Readiness for Transition of Care  Outcome: Ongoing, Progressing  Goal: Rounds/Family Conference  Outcome: Ongoing, Progressing       Patient sleeping well with CPAP. VSS with Cardene infusion. See flow sheet for full assessment

## 2019-12-31 NOTE — ASSESSMENT & PLAN NOTE
Will check pituitary axis;   Obtain old records if possible.  Continue dostinex for now (pending results)    12/31: to resume Dostinex;  Awaiting levels of pituitary hormones;TSH normal

## 2019-12-31 NOTE — HPI
The patient presented to the ER for elevated blood pressure. He had gone to [facility] outpatient clinic for vomiting and was noted to have a BP of 210/104. He was admitted to ICU and started on Cardizem drip. We have been consulted for vomiting blood. The patient reported heavy drinking three days ago. The following day, he developed nausea and non-bloody emesis. However, later that afternoon, he vomited bright red blood. This is what led to the outpatient clinic visit. The patient hasn't vomited since admit. His Hgb today is 17.3. BUN was normal on admit. Lipase 217. Patient denies abdominal pain, melena or hematochezia. He reports taking NSAIDs this week. He has no history of PUD.

## 2019-12-31 NOTE — ASSESSMENT & PLAN NOTE
Likely contributing to refractory hypertension;  Will have pulmonary see;  Will need confirmatory study; likely C-PAP titration      12/31:  Started on C-pap last evening by pulmonologist;  Will arrange for outpatient visit for C-PaP titration.

## 2019-12-31 NOTE — PROGRESS NOTES
[facility] Hospital Medicine  Progress Note    Patient Name: Grant Rodriguez  MRN: [id number]  Patient Class: IP- Inpatient   Admission Date: 12/30/2019  Length of Stay: 1 days  Attending Physician: García Guthrie MD  Primary Care Provider: Fernando Jimenez MD        Subjective:     Principal Problem:Hypertensive urgency        HPI:  50 YO AA male presenting to the hospital by private auto;  He complains of 2 days of nausea and vomiting and inability to keep anything down;   To day he reports several retching episodes and noted vomiting of bright red blood; He has a history of recently elevated blood pressures  3 weeks  Ago he was told to monitor his BP 3 times a day and was given hydralazine for PRN use;  He never recorded his blood pressures; He also was recently  told of having obstructive sleep apnea; however is not on any therapy for this.  On presentation to the ED he complains of headaches and had systolic BP   In excess of 220 , prompting institution of a cardene drip and subsequent admission to the ICU; Patient is also on therapy  With Dostinex for a pituitary tumor   with prolactinoma, diagnosed several years ago;  He see an endocrinologist at [facility] in [place]. He also has impaired glucose tolerance and is overweight.  Family history is notable for hypertension and diabetes;     Overview/Hospital Course:  Patient treated symptomatically with zofran for the nausea and started on a cardene drip; being transferred to the ICU for closer monitoring and furthert  Therapy; pulmonary medicine consulted; might benefit from C-pap therapy    12/31:  did well overnight with addition of C-PAP to the regimen;  Came off the Cardene drip and was placed on oral antihypertensive;    BP for the most part down and plans were for home with office follow up; However BP once again shot up to 180 systolic with resumed N/V; will therefore cancel D/C and move to telemetry, for further evaluation and  management;     Interval History: Presenting with hypertensive urgency with N/V and GI bleed (felt julieta jeison tear); Off  Cardene now but continues with extremes of BP despite adding oral meds to the regimen;  Resumed Nausea this morning;     Review of Systems   Constitutional: Positive for chills. Negative for activity change, appetite change, diaphoresis, fatigue, fever and unexpected weight change.   HENT: Negative.    Eyes: Negative.    Respiratory: Negative for apnea, cough, choking, chest tightness, shortness of breath, wheezing and stridor.    Cardiovascular: Negative for chest pain, palpitations and leg swelling.   Gastrointestinal: Positive for nausea. Negative for abdominal distention.   Endocrine: Negative.    Genitourinary: Negative.    Musculoskeletal: Negative.    Skin: Negative.    Neurological: Negative for dizziness, seizures, syncope, facial asymmetry, numbness and headaches.   Psychiatric/Behavioral: Negative for agitation, behavioral problems and confusion.     Objective:     Vital Signs (Most Recent):  Temp: 98.3 °F (36.8 °C) (12/31/19 1105)  Pulse: 63 (12/31/19 1105)  Resp: 19 (12/31/19 1105)  BP: (!) 180/82 (12/31/19 1105)  SpO2: 98 % (12/31/19 1105) Vital Signs (24h Range):  Temp:  [98.2 °F (36.8 °C)-99.2 °F (37.3 °C)] 98.3 °F (36.8 °C)  Pulse:  [] 63  Resp:  [12-27] 19  SpO2:  [95 %-100 %] 98 %  BP: (127-222)/() 180/82     Weight: 112 kg (246 lb 14.6 oz)  Body mass index is 36.46 kg/m².    Intake/Output Summary (Last 24 hours) at 12/31/2019 1127  Last data filed at 12/31/2019 1105  Gross per 24 hour   Intake --   Output 675 ml   Net -675 ml      Physical Exam   Constitutional: He is oriented to person, place, and time. He appears well-developed and well-nourished. No distress.   HENT:   Head: Normocephalic and atraumatic.   Eyes: Pupils are equal, round, and reactive to light. EOM are normal.   Neck: Normal range of motion.   Cardiovascular: Normal rate and regular rhythm.    Pulmonary/Chest: Effort normal and breath sounds normal. No respiratory distress. He has no wheezes.   Abdominal: Soft. Bowel sounds are normal. He exhibits no distension. There is no tenderness. There is no guarding.   Genitourinary:   Genitourinary Comments: deferred   Musculoskeletal: Normal range of motion. He exhibits no edema, tenderness or deformity.   Neurological: He is alert and oriented to person, place, and time. No cranial nerve deficit.   Skin: Skin is warm and dry. Capillary refill takes less than 2 seconds. He is not diaphoretic.   Psychiatric: He has a normal mood and affect. His behavior is normal. Judgment and thought content normal.       Significant Labs:   BMP:   Recent Labs   Lab 12/31/19  0347   *      K 3.4*   CL 98   CO2 25   BUN 24*   CREATININE 1.7*   CALCIUM 8.8   MG 2.1     CBC:   Recent Labs   Lab 12/30/19  1220 12/30/19  1754 12/30/19  2359 12/31/19  0347   WBC 14.64*  --   --  13.17*   HGB 18.1* 17.8 17.3 17.1  17.1   HCT 53.7  --   --  50.8     --   --  191     CMP:   Recent Labs   Lab 12/30/19  1329 12/31/19  0347    137   K 4.0 3.4*   CL 94* 98   CO2 29 25   * 117*   BUN 16 24*   CREATININE 1.5* 1.7*   CALCIUM 9.6 8.8   PROT 8.1  --    ALBUMIN 4.1  --    BILITOT 1.0  --    ALKPHOS 71  --    AST 26  --    ALT 24  --    ANIONGAP 13 14   EGFRNONAA 54* 46*     Cardiac Markers:   Recent Labs   Lab 12/30/19  1220   *     Coagulation:   Recent Labs   Lab 12/30/19  1220   INR 1.1   APTT 32.1*     Lactic Acid: No results for input(s): LACTATE in the last 48 hours.  Lipid Panel: No results for input(s): CHOL, HDL, LDLCALC, TRIG, CHOLHDL in the last 48 hours.  Magnesium:   Recent Labs   Lab 12/31/19  0347   MG 2.1     Troponin:   Recent Labs   Lab 12/30/19  1329 12/30/19  1905 12/31/19  0113   TROPONINI 0.019 0.035* 0.033*     TSH:   Recent Labs   Lab 12/30/19  1754   TSH 1.528       Significant Imaging:   Imaging Results          X-Ray Chest AP  Portable (Final result)  Result time 12/30/19 12:43:00    Final result by JUAN PABLO Rogers Sr., MD (12/30/19 12:43:00)                 Impression:      1. The lungs are clear.  2. The size of the heart is prominent.  This may be secondary to magnification.  .      Electronically signed by: Griffin Rogers MD  Date:    12/30/2019  Time:    12:43             Narrative:    EXAMINATION:  XR CHEST AP PORTABLE    CLINICAL HISTORY:  chest pain;    COMPARISON:  None    FINDINGS:  The size of the heart is prominent.  The lungs are clear. There is no pneumothorax.  The costophrenic angles are sharp.                                Assessment/Plan:      * Hypertensive urgency  Newly diagnosed hypertension  Must exclude secondary causes RVH; OSAS; Endocrine hypertension;   Will check pituitary axis (FSH, LH, ACTH, TSH; GH, Prolactin)  Aldosterone/renin ratio  Plasma free metanephrines  Admitted to ICU; Continue cardene with institution of an oral regimen pending course.    12/31:  Continue lability of the BP off the cardene;  Started on oral Norvasc; Hydralazine; HCTZ and labetolol  Suspect secondary hypertension; have requested a CT abdomen -stone protocol to get a look at kidneys and adrenals;   Awaiting Parker/renin ratio and plasma free metanephrines together with pituitary axis hormones;   Continue to monitor renal function and lytes;    Leukocytosis  Elevated on admission; question reactive; Complained of chills in the days preceeding admission  Will culture broadly  for temp spikes;  Trend labs.    12/31;  Trending downward;     JILLIAN (obstructive sleep apnea)    Likely contributing to refractory hypertension;  Will have pulmonary see;  Will need confirmatory study; likely C-PAP titration      12/31:  Started on C-pap last evening by pulmonologist;  Will arrange for outpatient visit for C-PaP titration.     Prolactinoma  Will check pituitary axis;   Obtain old records if possible.  Continue dostinex for now (pending  results)    12/31: to resume Dostinex;  Awaiting levels of pituitary hormones;TSH normal        VTE Risk Mitigation (From admission, onward)         Ordered     Place sequential compression device  Until discontinued      12/31/19 0913     Place sequential compression device  Until discontinued      12/30/19 1547     Place GERDA hose  Until discontinued      12/30/19 1547     Place sequential compression device  Until discontinued      12/30/19 1547                Critical care time spent on the evaluation and treatment of severe organ dysfunction, review of pertinent labs and imaging studies, discussions with consulting providers and discussions with patient/family: 35 minutes.      García Victor MD  Department of Hospital Medicine   [facility]

## 2020-01-01 PROBLEM — N17.9 AKI (ACUTE KIDNEY INJURY): Status: ACTIVE | Noted: 2020-01-01

## 2020-01-01 LAB
ANION GAP SERPL CALC-SCNC: 14 MMOL/L (ref 8–16)
BASOPHILS # BLD AUTO: 0.02 K/UL (ref 0–0.2)
BASOPHILS NFR BLD: 0.2 % (ref 0–1.9)
BUN SERPL-MCNC: 24 MG/DL (ref 6–20)
CALCIUM SERPL-MCNC: 9.1 MG/DL (ref 8.7–10.5)
CHLORIDE SERPL-SCNC: 97 MMOL/L (ref 95–110)
CO2 SERPL-SCNC: 25 MMOL/L (ref 23–29)
CREAT SERPL-MCNC: 2 MG/DL (ref 0.5–1.4)
DIFFERENTIAL METHOD: NORMAL
EOSINOPHIL # BLD AUTO: 0 K/UL (ref 0–0.5)
EOSINOPHIL NFR BLD: 0.1 % (ref 0–8)
ERYTHROCYTE [DISTWIDTH] IN BLOOD BY AUTOMATED COUNT: 14 % (ref 11.5–14.5)
EST. GFR  (AFRICAN AMERICAN): 44 ML/MIN/1.73 M^2
EST. GFR  (NON AFRICAN AMERICAN): 38 ML/MIN/1.73 M^2
GLUCOSE SERPL-MCNC: 101 MG/DL (ref 70–110)
HCT VFR BLD AUTO: 51.2 % (ref 40–54)
HGB BLD-MCNC: 17.2 G/DL (ref 14–18)
IMM GRANULOCYTES # BLD AUTO: 0.02 K/UL (ref 0–0.04)
IMM GRANULOCYTES NFR BLD AUTO: 0.2 % (ref 0–0.5)
LYMPHOCYTES # BLD AUTO: 2.3 K/UL (ref 1–4.8)
LYMPHOCYTES NFR BLD: 25.8 % (ref 18–48)
MAGNESIUM SERPL-MCNC: 2.4 MG/DL (ref 1.6–2.6)
MCH RBC QN AUTO: 30.3 PG (ref 27–31)
MCHC RBC AUTO-ENTMCNC: 33.6 G/DL (ref 32–36)
MCV RBC AUTO: 90 FL (ref 82–98)
MONOCYTES # BLD AUTO: 0.8 K/UL (ref 0.3–1)
MONOCYTES NFR BLD: 8.8 % (ref 4–15)
NEUTROPHILS # BLD AUTO: 5.8 K/UL (ref 1.8–7.7)
NEUTROPHILS NFR BLD: 64.9 % (ref 38–73)
NRBC BLD-RTO: 0 /100 WBC
PLATELET # BLD AUTO: 181 K/UL (ref 150–350)
PMV BLD AUTO: 10 FL (ref 9.2–12.9)
POTASSIUM SERPL-SCNC: 3.7 MMOL/L (ref 3.5–5.1)
RBC # BLD AUTO: 5.67 M/UL (ref 4.6–6.2)
SODIUM SERPL-SCNC: 136 MMOL/L (ref 136–145)
WBC # BLD AUTO: 8.9 K/UL (ref 3.9–12.7)

## 2020-01-01 PROCEDURE — 99232 SBSQ HOSP IP/OBS MODERATE 35: CPT | Mod: ,,, | Performed by: INTERNAL MEDICINE

## 2020-01-01 PROCEDURE — 25000003 PHARM REV CODE 250: Performed by: INTERNAL MEDICINE

## 2020-01-01 PROCEDURE — 99232 PR SUBSEQUENT HOSPITAL CARE,LEVL II: ICD-10-PCS | Mod: ,,, | Performed by: INTERNAL MEDICINE

## 2020-01-01 PROCEDURE — 80048 BASIC METABOLIC PNL TOTAL CA: CPT

## 2020-01-01 PROCEDURE — 63600175 PHARM REV CODE 636 W HCPCS: Performed by: INTERNAL MEDICINE

## 2020-01-01 PROCEDURE — 85025 COMPLETE CBC W/AUTO DIFF WBC: CPT

## 2020-01-01 PROCEDURE — 83735 ASSAY OF MAGNESIUM: CPT

## 2020-01-01 PROCEDURE — 21400001 HC TELEMETRY ROOM

## 2020-01-01 PROCEDURE — 36415 COLL VENOUS BLD VENIPUNCTURE: CPT

## 2020-01-01 RX ORDER — SODIUM CHLORIDE 9 MG/ML
INJECTION, SOLUTION INTRAVENOUS CONTINUOUS
Status: DISCONTINUED | OUTPATIENT
Start: 2020-01-01 | End: 2020-01-02 | Stop reason: HOSPADM

## 2020-01-01 RX ADMIN — PANTOPRAZOLE SODIUM 40 MG: 40 TABLET, DELAYED RELEASE ORAL at 08:01

## 2020-01-01 RX ADMIN — CABERGOLINE 0.5 MG: 0.5 TABLET ORAL at 02:01

## 2020-01-01 RX ADMIN — HYDRALAZINE HYDROCHLORIDE 75 MG: 50 TABLET, FILM COATED ORAL at 05:01

## 2020-01-01 RX ADMIN — HYDRALAZINE HYDROCHLORIDE 75 MG: 50 TABLET, FILM COATED ORAL at 01:01

## 2020-01-01 RX ADMIN — LABETALOL HYDROCHLORIDE 200 MG: 200 TABLET, FILM COATED ORAL at 08:01

## 2020-01-01 RX ADMIN — HYDRALAZINE HYDROCHLORIDE 75 MG: 50 TABLET, FILM COATED ORAL at 10:01

## 2020-01-01 RX ADMIN — PANTOPRAZOLE SODIUM 40 MG: 40 TABLET, DELAYED RELEASE ORAL at 09:01

## 2020-01-01 RX ADMIN — DOCUSATE SODIUM 100 MG: 100 CAPSULE, LIQUID FILLED ORAL at 09:01

## 2020-01-01 RX ADMIN — LORAZEPAM 0.5 MG: 0.5 TABLET ORAL at 10:01

## 2020-01-01 RX ADMIN — AMLODIPINE BESYLATE 10 MG: 10 TABLET ORAL at 09:01

## 2020-01-01 RX ADMIN — SODIUM CHLORIDE: 0.9 INJECTION, SOLUTION INTRAVENOUS at 05:01

## 2020-01-01 RX ADMIN — HYDROCHLOROTHIAZIDE 25 MG: 25 TABLET ORAL at 09:01

## 2020-01-01 RX ADMIN — LABETALOL HYDROCHLORIDE 200 MG: 200 TABLET, FILM COATED ORAL at 09:01

## 2020-01-01 NOTE — SUBJECTIVE & OBJECTIVE
Review of Systems   Constitution: Positive for malaise/fatigue.   HENT: Negative.    Eyes: Negative.    Cardiovascular: Negative.    Respiratory: Negative.    Endocrine: Negative.    Hematologic/Lymphatic: Negative.    Skin: Negative.    Musculoskeletal: Negative.    Gastrointestinal: Positive for nausea and vomiting.   Genitourinary: Negative.    Neurological: Positive for weakness.   Psychiatric/Behavioral: Negative.    Allergic/Immunologic: Negative.      Objective:     Vital Signs (Most Recent):  Temp: 98.5 °F (36.9 °C) (01/01/20 1607)  Pulse: 87 (01/01/20 1607)  Resp: 18 (01/01/20 1607)  BP: (!) 164/86 (01/01/20 1607)  SpO2: 96 % (01/01/20 1607) Vital Signs (24h Range):  Temp:  [98 °F (36.7 °C)-98.8 °F (37.1 °C)] 98.5 °F (36.9 °C)  Pulse:  [60-98] 87  Resp:  [16-18] 18  SpO2:  [95 %-100 %] 96 %  BP: (121-173)/(68-91) 164/86     Weight: 112 kg (246 lb 14.6 oz)  Body mass index is 36.46 kg/m².     SpO2: 96 %  O2 Device (Oxygen Therapy): room air      Intake/Output Summary (Last 24 hours) at 1/1/2020 1610  Last data filed at 1/1/2020 0400  Gross per 24 hour   Intake 180 ml   Output 400 ml   Net -220 ml       Lines/Drains/Airways     Peripheral Intravenous Line                 Peripheral IV - Single Lumen 12/30/19 1220 20 G Right Antecubital 2 days         Peripheral IV - Single Lumen 12/30/19 1753 20 G Left Hand 1 day                Physical Exam   Constitutional: He is oriented to person, place, and time. He appears well-developed and well-nourished. No distress.   HENT:   Head: Normocephalic and atraumatic.   Eyes: Pupils are equal, round, and reactive to light. Right eye exhibits no discharge. Left eye exhibits no discharge.   Neck: Neck supple. No JVD present.   Cardiovascular: Normal rate, regular rhythm, S1 normal and S2 normal.   Murmur (RUSB SM) heard.  Pulmonary/Chest: Effort normal and breath sounds normal. No respiratory distress. He has no wheezes. He has no rales.   Abdominal: Soft. He exhibits no  distension.   Musculoskeletal: He exhibits no edema.   Neurological: He is alert and oriented to person, place, and time.   Skin: Skin is warm and dry. He is not diaphoretic. No erythema.   Psychiatric: He has a normal mood and affect. His behavior is normal. Thought content normal.   Nursing note and vitals reviewed.      Significant Labs:   ABG: No results for input(s): PH, PCO2, HCO3, POCSATURATED, BE in the last 48 hours., Blood Culture: No results for input(s): LABBLOO in the last 48 hours., BMP:   Recent Labs   Lab 12/31/19 0347 01/01/20  0450   * 101    136   K 3.4* 3.7   CL 98 97   CO2 25 25   BUN 24* 24*   CREATININE 1.7* 2.0*   CALCIUM 8.8 9.1   MG 2.1 2.4   , CMP   Recent Labs   Lab 12/31/19 0347 01/01/20  0450    136   K 3.4* 3.7   CL 98 97   CO2 25 25   * 101   BUN 24* 24*   CREATININE 1.7* 2.0*   CALCIUM 8.8 9.1   ANIONGAP 14 14   ESTGFRAFRICA 53* 44*   EGFRNONAA 46* 38*   , CBC   Recent Labs   Lab 12/30/19  2359  12/31/19 0347 01/01/20  0450   WBC  --   --  13.17* 8.90   HGB 17.3  --  17.1  17.1 17.2   HCT  --    < > 50.8 51.2   PLT  --   --  191 181    < > = values in this interval not displayed.   , INR No results for input(s): INR, PROTIME in the last 48 hours., Lipid Panel No results for input(s): CHOL, HDL, LDLCALC, TRIG, CHOLHDL in the last 48 hours. and Troponin   Recent Labs   Lab 12/30/19  1905 12/31/19  0113   TROPONINI 0.035* 0.033*       Significant Imaging: Echocardiogram:   2D echo with color flow doppler:   Results for orders placed or performed during the hospital encounter of 12/30/19   2D echo with color flow doppler   Result Value Ref Range    QEF 60 55 - 65    Diastolic Dysfunction No     Narrative    Date of Procedure: 12/31/2019        TEST DESCRIPTION   Technical Quality: This is a technically challenging study.     Aorta: The aortic root is normal in size, measuring 3.3 cm at sinotubular junction and 3.4 cm at Sinuses of Valsalva. The proximal  ascending aorta is normal in size, measuring 3.3 cm across.     Left Atrium: The left atrial volume index is mildly enlarged, measuring 38.30 cc/m2.     Left Ventricle: The left ventricle is normal in size, with an end-diastolic diameter of 4.6 cm, and an end-systolic diameter of 2.8 cm. LV wall thickness is normal, with the septum measuring 1.7 cm and the posterior wall measuring 1.6 cm across. Relative   wall thickness was increased at 0.70, and the LV mass index was increased at 176.1 g/m2 consistent with concentric left ventricular hypertrophy. There are no regional wall motion abnormalities. Left ventricular systolic function appears normal. Visually   estimated ejection fraction is 60-65%. The LV Doppler derived stroke volume equals 111.0 ccs.     Diastolic indices: E wave velocity 0.8 m/s, E/A ratio 1.1,  msec., E/e' ratio(avg) 7. Diastolic function is normal.     Right Atrium: The right atrium is normal in size, measuring 3.7 cm in length and 3.2 cm in width in the apical view.     Right Ventricle: The right ventricle is normal in size. Global right ventricular systolic function appears normal. Tricuspid annular plane systolic excursion (TAPSE) is 2.0 cm.     Aortic Valve:  Aortic valve is normal in structure with normal leaflet mobility. The mean gradient obtained across the aortic valve is 10 mmHg.     Mitral Valve:  Mitral valve is normal in structure with normal leaflet mobility. The pressure half time is 73 msec. The calculated mitral valve area is 3.01 cm2.     Tricuspid Valve:  Tricuspid valve is normal in structure with normal leaflet mobility.     Pulmonary Valve:  Pulmonary valve is normal in structure with normal leaflet mobility.     IVC: IVC is normal in size and collapses > 50% with a sniff, suggesting normal right atrial pressure of 3 mmHg.     Intracavitary: There is no evidence of pericardial effusion, intracavity mass, thrombi, or vegetation.         CONCLUSIONS     1 - Mild left  atrial enlargement.     2 - Concentric hypertrophy.     3 - No wall motion abnormalities.     4 - Normal left ventricular systolic function (EF 60-65%).     5 - Normal left ventricular diastolic function.     6 - Normal right ventricular systolic function .             This document has been electronically    SIGNED BY: Phong Soriano MD On: 12/31/2019 16:00

## 2020-01-01 NOTE — ASSESSMENT & PLAN NOTE
Creatinine up to 2.0 (was 1.4 in early Dec; 1.5 on admission) likely a reflection of tighter BP control; will trend;  Continue to monitor renal function and electrolytes;

## 2020-01-01 NOTE — ASSESSMENT & PLAN NOTE
1/1/20; trending downward;  Cardiology made aware;  Will continue to monitor on telemetry;     2D echo results reviewed:  1 - Mild left atrial enlargement.     2 - Concentric hypertrophy.     3 - No wall motion abnormalities.     4 - Normal left ventricular systolic function (EF 60-65%).     5 - Normal left ventricular diastolic function.     6 - Normal right ventricular systolic function .

## 2020-01-01 NOTE — PLAN OF CARE
Patient awake, alert and oriented x 4, calm, cooperative.  Patient able to make needs known.   Needs met by staff for shift.  No acute distress noted, respirations even and unlabored.   Patient denies pain at this time.   PRN pain meds administered per MAR.  Pt ambulates self, gait steady.   Started IV fluids 125ml/hr cont per Soriano.   IV site patent and intact, no redness.  No other complaints voiced at this time.   Continue on telemetry monitoring.   Safety precautions remains in place.   Will continue to monitor.

## 2020-01-01 NOTE — ASSESSMENT & PLAN NOTE
Likely contributing to refractory hypertension;  Will have pulmonary see;  Will need confirmatory study; likely C-PAP titration      12/31:  Started on C-pap last evening by pulmonologist;  Will arrange for outpatient visit for C-PaP titration.     1/1/20:  Will follow with pulmonary as outpatient for C-PAP titration;

## 2020-01-01 NOTE — HOSPITAL COURSE
Mr. Rodriguez is a 49 year old male patient whose current medical conditions include prolactinoma (on dostinex), tobacco abuse, JILLIAN, and recently diagnosed HTN who was sent to [facility] ED from primary care clinic yesterday due to uncontrolled HTN. Patient complained of generalized malaise/fatigue along with severe nausea and multiple episodes of emesis, some blood-tinged,  over the past 48 hours. He denied any associated abdominal pain, chest pain, SOB, palpitations, near syncope, or syncope. Initial workup in ED revealed markedly elevated /110 and patient was subsequently placed on cardene drip and admitted for further evaluation and treatment. Cardiology consulted to assist with management. Patient seen and examined today, lying in bed. Still feels poorly. Complains of nausea/GI upset. Remains chest pain free. States he was only recently diagnosed with elevated BP and was given hydralazine to use on prn basis and only took one dose prior to his admission. No prior cardiac history. Chart reviewed. Troponin 0.019>0.035>0.033. EKG reviewed and showed SR with non-specific T wave abnormality, LVH. 2D echo pending.    Of note, patient recently diagnosed with JILLIAN.     01/01/2020  BP controlled., still abd pain . Cr up to 2.0 Renal US showed possible right renal artery stenosis    1/2/2020  -patient had uneventful night. abd pain improved. Creatine stable at 1.7. No angina or equivalent. 2D echo showes LVEF 60%. Troponin flat 0.019, 0.035, 0.033. BP improved from admit.

## 2020-01-01 NOTE — SUBJECTIVE & OBJECTIVE
Interval History: renal artery study abnormal; suggestive of right LIAM; asked cardiology Dr. Soriano to see; avoiding ACEI at present; Continue lability of BP; adjusting meds;     Review of Systems   Constitutional: Positive for chills. Negative for activity change, appetite change, diaphoresis, fatigue, fever and unexpected weight change.   HENT: Negative for congestion, sneezing, sore throat and voice change.    Eyes: Negative for pain, discharge and itching.   Respiratory: Negative for apnea, cough, choking, chest tightness and shortness of breath.    Cardiovascular: Negative for chest pain, palpitations and leg swelling.   Gastrointestinal: Negative for abdominal distention, abdominal pain, nausea and vomiting.   Endocrine: Negative for cold intolerance and heat intolerance.   Genitourinary: Negative for difficulty urinating.   Musculoskeletal: Negative for arthralgias and neck pain.   Skin: Negative for color change.   Allergic/Immunologic: Negative.    Neurological: Negative for dizziness, tremors, syncope and headaches.   Hematological: Negative for adenopathy. Does not bruise/bleed easily.   Psychiatric/Behavioral: Positive for agitation. Negative for behavioral problems and confusion.     Objective:     Vital Signs (Most Recent):  Temp: 98.3 °F (36.8 °C) (01/01/20 0816)  Pulse: 75 (01/01/20 0816)  Resp: 18 (01/01/20 0816)  BP: (!) 162/87 (01/01/20 0816)  SpO2: 98 % (01/01/20 0816) Vital Signs (24h Range):  Temp:  [97.8 °F (36.6 °C)-98.8 °F (37.1 °C)] 98.3 °F (36.8 °C)  Pulse:  [60-98] 75  Resp:  [16-24] 18  SpO2:  [95 %-100 %] 98 %  BP: (121-185)/(68-91) 162/87     Weight: 112 kg (246 lb 14.6 oz)  Body mass index is 36.46 kg/m².    Intake/Output Summary (Last 24 hours) at 1/1/2020 1004  Last data filed at 1/1/2020 0400  Gross per 24 hour   Intake 180 ml   Output 750 ml   Net -570 ml      Physical Exam   Constitutional: He appears well-developed and well-nourished. No distress.   HENT:   Head: Normocephalic  and atraumatic.   Eyes: Pupils are equal, round, and reactive to light.   Neck: Normal range of motion. No JVD present. No tracheal deviation present. No thyromegaly present.   Cardiovascular: Normal rate and regular rhythm.   Pulmonary/Chest: Effort normal and breath sounds normal. No respiratory distress. He has no wheezes. He has no rales.   Abdominal: Soft. Bowel sounds are normal. He exhibits no distension.   Genitourinary:   Genitourinary Comments: Deferred   Skin: He is not diaphoretic.   Nursing note and vitals reviewed.      Significant Labs:   BMP:   Recent Labs   Lab 01/01/20  0450         K 3.7   CL 97   CO2 25   BUN 24*   CREATININE 2.0*   CALCIUM 9.1   MG 2.4     CBC:   Recent Labs   Lab 12/30/19  1220  12/30/19  2359 12/31/19  0347 01/01/20  0450   WBC 14.64*  --   --  13.17* 8.90   HGB 18.1*   < > 17.3 17.1  17.1 17.2   HCT 53.7  --   --  50.8 51.2     --   --  191 181    < > = values in this interval not displayed.     CMP:   Recent Labs   Lab 12/30/19  1329 12/31/19  0347 01/01/20  0450    137 136   K 4.0 3.4* 3.7   CL 94* 98 97   CO2 29 25 25   * 117* 101   BUN 16 24* 24*   CREATININE 1.5* 1.7* 2.0*   CALCIUM 9.6 8.8 9.1   PROT 8.1  --   --    ALBUMIN 4.1  --   --    BILITOT 1.0  --   --    ALKPHOS 71  --   --    AST 26  --   --    ALT 24  --   --    ANIONGAP 13 14 14   EGFRNONAA 54* 46* 38*     Magnesium:   Recent Labs   Lab 12/31/19  0347 01/01/20  0450   MG 2.1 2.4     Troponin:   Recent Labs   Lab 12/30/19  1329 12/30/19  1905 12/31/19  0113   TROPONINI 0.019 0.035* 0.033*     TSH:   Recent Labs   Lab 12/30/19  1754   TSH 1.528       Significant Imaging:   Imaging Results          X-Ray Chest AP Portable (Final result)  Result time 12/30/19 12:43:00    Final result by JUAN PABLO Rogers Sr., MD (12/30/19 12:43:00)                 Impression:      1. The lungs are clear.  2. The size of the heart is prominent.  This may be secondary to  magnification.  .      Electronically signed by: Griffin Rogers MD  Date:    12/30/2019  Time:    12:43             Narrative:    EXAMINATION:  XR CHEST AP PORTABLE    CLINICAL HISTORY:  chest pain;    COMPARISON:  None    FINDINGS:  The size of the heart is prominent.  The lungs are clear. There is no pneumothorax.  The costophrenic angles are sharp.

## 2020-01-01 NOTE — PLAN OF CARE
Pt c/o of HTN crisis.   Interventions performed: BP monitored, cardiac monitoring continued, meds given, N/V meds given prn, activity increased as tolerated, labs monitored.  Pt tolerated activity within room, VS monitoring.  Mobility status: Independent, minimal assist.   Patient SR on monitor.   Safety maintained per pt.  Pt aware of POC.  Will continue to monitor.

## 2020-01-01 NOTE — ASSESSMENT & PLAN NOTE
Elevated on admission; question reactive; Complained of chills in the days preceeding admission  Will culture broadly  for temp spikes;  Trend labs.    12/31;  Trending downward;   \  1/1/20:  Likely reactive; now back top normal; will continue to monitor;

## 2020-01-01 NOTE — PROGRESS NOTES
[facility] Hospital Medicine  Progress Note    Patient Name: Grant Rodriguez  MRN: [id number]  Patient Class: IP- Inpatient   Admission Date: 12/30/2019  Length of Stay: 2 days  Attending Physician: García Guthrie MD  Primary Care Provider: Fernando Jimenez MD        Subjective:     Principal Problem:Hypertensive urgency        HPI:  48 YO AA male presenting to the hospital by private auto;  He complains of 2 days of nausea and vomiting and inability to keep anything down;   To day he reports several retching episodes and noted vomiting of bright red blood; He has a history of recently elevated blood pressures  3 weeks  Ago he was told to monitor his BP 3 times a day and was given hydralazine for PRN use;  He never recorded his blood pressures; He also was recently  told of having obstructive sleep apnea; however is not on any therapy for this.  On presentation to the ED he complains of headaches and had systolic BP   In excess of 220 , prompting institution of a cardene drip and subsequent admission to the ICU; Patient is also on therapy  With Dostinex for a pituitary tumor   with prolactinoma, diagnosed several years ago;  He see an endocrinologist at [facility] in [place]. He also has impaired glucose tolerance and is overweight.  Family history is notable for hypertension and diabetes;     Overview/Hospital Course:  Patient treated symptomatically with zofran for the nausea and started on a cardene drip; being transferred to the ICU for closer monitoring and furthert  Therapy; pulmonary medicine consulted; might benefit from C-pap therapy    12/31:  did well overnight with addition of C-PAP to the regimen;  Came off the Cardene drip and was placed on oral antihypertensive;    BP for the most part down and plans were for home with office follow up; However BP once again shot up to 180 systolic with resumed N/V; will therefore cancel D/C and move to telemetry, for further evaluation and  management;     1/1/20: Reviewed renal artery study from yesterday; suggestive for renal artery stenosis on the right;  Discussed with Dr. Soriano;   BP continues labile; anxious at times; did well with ativan overnight;  For his sleep apnea, discussed with Dr. Zhang and patient can be set up for outpatient follow up; Adjusting antihypertensive;  Avoiding ACEI at pressent; Creatinine up to 2.0 was 1.4 in early December and 1.5 on admission; likely a reflection of tighter BP control; will likely stabilize over time;     Interval History: renal artery study abnormal; suggestive of right LIAM; asked cardiology Dr. Soriano to see; avoiding ACEI at present; Continue lability of BP; adjusting meds;     Review of Systems   Constitutional: Positive for chills. Negative for activity change, appetite change, diaphoresis, fatigue, fever and unexpected weight change.   HENT: Negative for congestion, sneezing, sore throat and voice change.    Eyes: Negative for pain, discharge and itching.   Respiratory: Negative for apnea, cough, choking, chest tightness and shortness of breath.    Cardiovascular: Negative for chest pain, palpitations and leg swelling.   Gastrointestinal: Negative for abdominal distention, abdominal pain, nausea and vomiting.   Endocrine: Negative for cold intolerance and heat intolerance.   Genitourinary: Negative for difficulty urinating.   Musculoskeletal: Negative for arthralgias and neck pain.   Skin: Negative for color change.   Allergic/Immunologic: Negative.    Neurological: Negative for dizziness, tremors, syncope and headaches.   Hematological: Negative for adenopathy. Does not bruise/bleed easily.   Psychiatric/Behavioral: Positive for agitation. Negative for behavioral problems and confusion.     Objective:     Vital Signs (Most Recent):  Temp: 98.3 °F (36.8 °C) (01/01/20 0816)  Pulse: 75 (01/01/20 0816)  Resp: 18 (01/01/20 0816)  BP: (!) 162/87 (01/01/20 0816)  SpO2: 98 % (01/01/20 0816) Vital Signs  (24h Range):  Temp:  [97.8 °F (36.6 °C)-98.8 °F (37.1 °C)] 98.3 °F (36.8 °C)  Pulse:  [60-98] 75  Resp:  [16-24] 18  SpO2:  [95 %-100 %] 98 %  BP: (121-185)/(68-91) 162/87     Weight: 112 kg (246 lb 14.6 oz)  Body mass index is 36.46 kg/m².    Intake/Output Summary (Last 24 hours) at 1/1/2020 1004  Last data filed at 1/1/2020 0400  Gross per 24 hour   Intake 180 ml   Output 750 ml   Net -570 ml      Physical Exam   Constitutional: He appears well-developed and well-nourished. No distress.   HENT:   Head: Normocephalic and atraumatic.   Eyes: Pupils are equal, round, and reactive to light.   Neck: Normal range of motion. No JVD present. No tracheal deviation present. No thyromegaly present.   Cardiovascular: Normal rate and regular rhythm.   Pulmonary/Chest: Effort normal and breath sounds normal. No respiratory distress. He has no wheezes. He has no rales.   Abdominal: Soft. Bowel sounds are normal. He exhibits no distension.   Genitourinary:   Genitourinary Comments: Deferred   Skin: He is not diaphoretic.   Nursing note and vitals reviewed.      Significant Labs:   BMP:   Recent Labs   Lab 01/01/20  0450         K 3.7   CL 97   CO2 25   BUN 24*   CREATININE 2.0*   CALCIUM 9.1   MG 2.4     CBC:   Recent Labs   Lab 12/30/19  1220  12/30/19  2359 12/31/19  0347 01/01/20  0450   WBC 14.64*  --   --  13.17* 8.90   HGB 18.1*   < > 17.3 17.1  17.1 17.2   HCT 53.7  --   --  50.8 51.2     --   --  191 181    < > = values in this interval not displayed.     CMP:   Recent Labs   Lab 12/30/19  1329 12/31/19  0347 01/01/20  0450    137 136   K 4.0 3.4* 3.7   CL 94* 98 97   CO2 29 25 25   * 117* 101   BUN 16 24* 24*   CREATININE 1.5* 1.7* 2.0*   CALCIUM 9.6 8.8 9.1   PROT 8.1  --   --    ALBUMIN 4.1  --   --    BILITOT 1.0  --   --    ALKPHOS 71  --   --    AST 26  --   --    ALT 24  --   --    ANIONGAP 13 14 14   EGFRNONAA 54* 46* 38*     Magnesium:   Recent Labs   Lab 12/31/19  0346  01/01/20  0450   MG 2.1 2.4     Troponin:   Recent Labs   Lab 12/30/19  1329 12/30/19  1905 12/31/19  0113   TROPONINI 0.019 0.035* 0.033*     TSH:   Recent Labs   Lab 12/30/19  1754   TSH 1.528       Significant Imaging:   Imaging Results          X-Ray Chest AP Portable (Final result)  Result time 12/30/19 12:43:00    Final result by JUAN PABLO Rogers Sr., MD (12/30/19 12:43:00)                 Impression:      1. The lungs are clear.  2. The size of the heart is prominent.  This may be secondary to magnification.  .      Electronically signed by: Griffin Rogers MD  Date:    12/30/2019  Time:    12:43             Narrative:    EXAMINATION:  XR CHEST AP PORTABLE    CLINICAL HISTORY:  chest pain;    COMPARISON:  None    FINDINGS:  The size of the heart is prominent.  The lungs are clear. There is no pneumothorax.  The costophrenic angles are sharp.                                Assessment/Plan:      * Hypertensive urgency  Newly diagnosed hypertension  Must exclude secondary causes RVH; OSAS; Endocrine hypertension;   Will check pituitary axis (FSH, LH, ACTH, TSH; GH, Prolactin)  Aldosterone/renin ratio  Plasma free metanephrines  Admitted to ICU; Continue cardene with institution of an oral regimen pending course.    12/31:  Continue lability of the BP off the cardene;  Started on oral Norvasc; Hydralazine; HCTZ and labetolol  Suspect secondary hypertension; have requested a CT abdomen -stone protocol to get a look at kidneys and adrenals;   Awaiting Parker/renin ratio and plasma free metanephrines together with pituitary axis hormones;   Continue to monitor renal function and lytes;    1/1/20:  Renal artery study suggestive of LIAM on the right;  Dr. Soriano of cardiology aware; continue present regimen;  Holding on ACEI at present;     VITALY (acute kidney injury)  Creatinine up to 2.0 (was 1.4 in early Dec; 1.5 on admission) likely a reflection of tighter BP control; will trend;  Continue to monitor renal function and  electrolytes;       Elevated troponin    1/1/20; trending downward;  Cardiology made aware;  Will continue to monitor on telemetry;     2D echo results reviewed:  1 - Mild left atrial enlargement.     2 - Concentric hypertrophy.     3 - No wall motion abnormalities.     4 - Normal left ventricular systolic function (EF 60-65%).     5 - Normal left ventricular diastolic function.     6 - Normal right ventricular systolic function .     Leukocytosis  Elevated on admission; question reactive; Complained of chills in the days preceeding admission  Will culture broadly  for temp spikes;  Trend labs.    12/31;  Trending downward;   \  1/1/20:  Likely reactive; now back top normal; will continue to monitor;     JILLIAN (obstructive sleep apnea)    Likely contributing to refractory hypertension;  Will have pulmonary see;  Will need confirmatory study; likely C-PAP titration      12/31:  Started on C-pap last evening by pulmonologist;  Will arrange for outpatient visit for C-PaP titration.     1/1/20:  Will follow with pulmonary as outpatient for C-PAP titration;    Prolactinoma  Will check pituitary axis;   Obtain old records if possible.  Continue dostinex for now (pending results)    12/31: to resume Dostinex;  Awaiting levels of pituitary hormones;TSH normal        VTE Risk Mitigation (From admission, onward)         Ordered     Place sequential compression device  Until discontinued      12/31/19 0913     Place sequential compression device  Until discontinued      12/30/19 1547     Place GERDA hose  Until discontinued      12/30/19 1547     Place sequential compression device  Until discontinued      12/30/19 1547                      García Victor MD  Department of Hospital Medicine   [facility]

## 2020-01-01 NOTE — PROGRESS NOTES
[facility]  Cardiology  Progress Note    Patient Name: Grant Rodriguez  MRN: [id number]  Admission Date: 12/30/2019  Hospital Length of Stay: 2 days  Code Status: Full Code   Attending Physician: García Guthrie MD   Primary Care Physician: Fernando Jimenez MD  Expected Discharge Date:   Principal Problem:Hypertensive urgency    Subjective:     Hospital Course:   Mr. Rodriguez is a 49 year old male patient whose current medical conditions include prolactinoma (on dostinex), tobacco abuse, JILLIAN, and recently diagnosed HTN who was sent to [facility] ED from primary care clinic yesterday due to uncontrolled HTN. Patient complained of generalized malaise/fatigue along with severe nausea and multiple episodes of emesis, some blood-tinged,  over the past 48 hours. He denied any associated abdominal pain, chest pain, SOB, palpitations, near syncope, or syncope. Initial workup in ED revealed markedly elevated /110 and patient was subsequently placed on cardene drip and admitted for further evaluation and treatment. Cardiology consulted to assist with management. Patient seen and examined today, lying in bed. Still feels poorly. Complains of nausea/GI upset. Remains chest pain free. States he was only recently diagnosed with elevated BP and was given hydralazine to use on prn basis and only took one dose prior to his admission. No prior cardiac history. Chart reviewed. Troponin 0.019>0.035>0.033. EKG reviewed and showed SR with non-specific T wave abnormality, LVH. 2D echo pending.    Of note, patient recently diagnosed with JILLIAN.     01/01/2020  BP controlled., still abd pain . Cr up to 2.0 Renal US showed possible right renal artery stenosis      Review of Systems   Constitution: Positive for malaise/fatigue.   HENT: Negative.    Eyes: Negative.    Cardiovascular: Negative.    Respiratory: Negative.    Endocrine: Negative.    Hematologic/Lymphatic: Negative.    Skin: Negative.    Musculoskeletal: Negative.     Gastrointestinal: Positive for nausea and vomiting.   Genitourinary: Negative.    Neurological: Positive for weakness.   Psychiatric/Behavioral: Negative.    Allergic/Immunologic: Negative.      Objective:     Vital Signs (Most Recent):  Temp: 98.5 °F (36.9 °C) (01/01/20 1607)  Pulse: 87 (01/01/20 1607)  Resp: 18 (01/01/20 1607)  BP: (!) 164/86 (01/01/20 1607)  SpO2: 96 % (01/01/20 1607) Vital Signs (24h Range):  Temp:  [98 °F (36.7 °C)-98.8 °F (37.1 °C)] 98.5 °F (36.9 °C)  Pulse:  [60-98] 87  Resp:  [16-18] 18  SpO2:  [95 %-100 %] 96 %  BP: (121-173)/(68-91) 164/86     Weight: 112 kg (246 lb 14.6 oz)  Body mass index is 36.46 kg/m².     SpO2: 96 %  O2 Device (Oxygen Therapy): room air      Intake/Output Summary (Last 24 hours) at 1/1/2020 1610  Last data filed at 1/1/2020 0400  Gross per 24 hour   Intake 180 ml   Output 400 ml   Net -220 ml       Lines/Drains/Airways     Peripheral Intravenous Line                 Peripheral IV - Single Lumen 12/30/19 1220 20 G Right Antecubital 2 days         Peripheral IV - Single Lumen 12/30/19 1753 20 G Left Hand 1 day                Physical Exam   Constitutional: He is oriented to person, place, and time. He appears well-developed and well-nourished. No distress.   HENT:   Head: Normocephalic and atraumatic.   Eyes: Pupils are equal, round, and reactive to light. Right eye exhibits no discharge. Left eye exhibits no discharge.   Neck: Neck supple. No JVD present.   Cardiovascular: Normal rate, regular rhythm, S1 normal and S2 normal.   Murmur (RUSB SM) heard.  Pulmonary/Chest: Effort normal and breath sounds normal. No respiratory distress. He has no wheezes. He has no rales.   Abdominal: Soft. He exhibits no distension.   Musculoskeletal: He exhibits no edema.   Neurological: He is alert and oriented to person, place, and time.   Skin: Skin is warm and dry. He is not diaphoretic. No erythema.   Psychiatric: He has a normal mood and affect. His behavior is normal. Thought  content normal.   Nursing note and vitals reviewed.      Significant Labs:   ABG: No results for input(s): PH, PCO2, HCO3, POCSATURATED, BE in the last 48 hours., Blood Culture: No results for input(s): LABBLOO in the last 48 hours., BMP:   Recent Labs   Lab 12/31/19  0347 01/01/20  0450   * 101    136   K 3.4* 3.7   CL 98 97   CO2 25 25   BUN 24* 24*   CREATININE 1.7* 2.0*   CALCIUM 8.8 9.1   MG 2.1 2.4   , CMP   Recent Labs   Lab 12/31/19  0347 01/01/20  0450    136   K 3.4* 3.7   CL 98 97   CO2 25 25   * 101   BUN 24* 24*   CREATININE 1.7* 2.0*   CALCIUM 8.8 9.1   ANIONGAP 14 14   ESTGFRAFRICA 53* 44*   EGFRNONAA 46* 38*   , CBC   Recent Labs   Lab 12/30/19  2359  12/31/19  0347 01/01/20  0450   WBC  --   --  13.17* 8.90   HGB 17.3  --  17.1  17.1 17.2   HCT  --    < > 50.8 51.2   PLT  --   --  191 181    < > = values in this interval not displayed.   , INR No results for input(s): INR, PROTIME in the last 48 hours., Lipid Panel No results for input(s): CHOL, HDL, LDLCALC, TRIG, CHOLHDL in the last 48 hours. and Troponin   Recent Labs   Lab 12/30/19  1905 12/31/19  0113   TROPONINI 0.035* 0.033*       Significant Imaging: Echocardiogram:   2D echo with color flow doppler:   Results for orders placed or performed during the hospital encounter of 12/30/19   2D echo with color flow doppler   Result Value Ref Range    QEF 60 55 - 65    Diastolic Dysfunction No     Narrative    Date of Procedure: 12/31/2019        TEST DESCRIPTION   Technical Quality: This is a technically challenging study.     Aorta: The aortic root is normal in size, measuring 3.3 cm at sinotubular junction and 3.4 cm at Sinuses of Valsalva. The proximal ascending aorta is normal in size, measuring 3.3 cm across.     Left Atrium: The left atrial volume index is mildly enlarged, measuring 38.30 cc/m2.     Left Ventricle: The left ventricle is normal in size, with an end-diastolic diameter of 4.6 cm, and an end-systolic  diameter of 2.8 cm. LV wall thickness is normal, with the septum measuring 1.7 cm and the posterior wall measuring 1.6 cm across. Relative   wall thickness was increased at 0.70, and the LV mass index was increased at 176.1 g/m2 consistent with concentric left ventricular hypertrophy. There are no regional wall motion abnormalities. Left ventricular systolic function appears normal. Visually   estimated ejection fraction is 60-65%. The LV Doppler derived stroke volume equals 111.0 ccs.     Diastolic indices: E wave velocity 0.8 m/s, E/A ratio 1.1,  msec., E/e' ratio(avg) 7. Diastolic function is normal.     Right Atrium: The right atrium is normal in size, measuring 3.7 cm in length and 3.2 cm in width in the apical view.     Right Ventricle: The right ventricle is normal in size. Global right ventricular systolic function appears normal. Tricuspid annular plane systolic excursion (TAPSE) is 2.0 cm.     Aortic Valve:  Aortic valve is normal in structure with normal leaflet mobility. The mean gradient obtained across the aortic valve is 10 mmHg.     Mitral Valve:  Mitral valve is normal in structure with normal leaflet mobility. The pressure half time is 73 msec. The calculated mitral valve area is 3.01 cm2.     Tricuspid Valve:  Tricuspid valve is normal in structure with normal leaflet mobility.     Pulmonary Valve:  Pulmonary valve is normal in structure with normal leaflet mobility.     IVC: IVC is normal in size and collapses > 50% with a sniff, suggesting normal right atrial pressure of 3 mmHg.     Intracavitary: There is no evidence of pericardial effusion, intracavity mass, thrombi, or vegetation.         CONCLUSIONS     1 - Mild left atrial enlargement.     2 - Concentric hypertrophy.     3 - No wall motion abnormalities.     4 - Normal left ventricular systolic function (EF 60-65%).     5 - Normal left ventricular diastolic function.     6 - Normal right ventricular systolic function .              This document has been electronically    SIGNED BY: Phong Soriano MD On: 12/31/2019 16:00     Assessment and Plan:       * Hypertensive urgency  BP controlled  -Continue amlodipine, labetalol, hydralazine, HCTZ  -needs renal artery CTA once kidney function improved.  -Counseled on low salt diet  -start IVF at 125 cc/hr    VITALY (acute kidney injury)  Start IVF    Elevated troponin  -Troponin 0.019>0.035>0.033  -Mild elevation likely secondary to demand ischemia from uncontrolled HTN  -Consider ASA 81 mg daily if no further issues with hematemesis  -Continue amlodipine, BB, HCTZ  -Check 2D echo  -MPI stress test as OP once BP is controlled    JILLIAN (obstructive sleep apnea)  -Contributing to elevated BP  -Nightly CPAP usage    Prolactinoma  -Mgmt as per hospital medicine        VTE Risk Mitigation (From admission, onward)         Ordered     Place sequential compression device  Until discontinued      12/31/19 0913     Place sequential compression device  Until discontinued      12/30/19 1547     Place GERDA hose  Until discontinued      12/30/19 1547     Place sequential compression device  Until discontinued      12/30/19 1547                Phong Soriano MD  Cardiology  [facility]

## 2020-01-01 NOTE — ASSESSMENT & PLAN NOTE
BP controlled  -Continue amlodipine, labetalol, hydralazine, HCTZ  -needs renal artery CTA once kidney function improved.  -Counseled on low salt diet  -start IVF at 125 cc/hr

## 2020-01-02 ENCOUNTER — TELEPHONE (OUTPATIENT)
Dept: GASTROENTEROLOGY | Facility: CLINIC | Age: 50
End: 2020-01-02

## 2020-01-02 VITALS
DIASTOLIC BLOOD PRESSURE: 54 MMHG | RESPIRATION RATE: 18 BRPM | HEIGHT: 69 IN | WEIGHT: 242.31 LBS | BODY MASS INDEX: 35.89 KG/M2 | OXYGEN SATURATION: 96 % | TEMPERATURE: 99 F | HEART RATE: 69 BPM | SYSTOLIC BLOOD PRESSURE: 102 MMHG

## 2020-01-02 PROBLEM — R79.89 ELEVATED TROPONIN: Status: RESOLVED | Noted: 2019-12-31 | Resolved: 2020-01-02

## 2020-01-02 PROBLEM — D72.829 LEUKOCYTOSIS: Status: RESOLVED | Noted: 2019-12-30 | Resolved: 2020-01-02

## 2020-01-02 PROBLEM — I16.0 HYPERTENSIVE URGENCY: Status: RESOLVED | Noted: 2019-12-30 | Resolved: 2020-01-02

## 2020-01-02 LAB
ANION GAP SERPL CALC-SCNC: 10 MMOL/L (ref 8–16)
BASOPHILS # BLD AUTO: 0.03 K/UL (ref 0–0.2)
BASOPHILS NFR BLD: 0.4 % (ref 0–1.9)
BUN SERPL-MCNC: 21 MG/DL (ref 6–20)
CALCIUM SERPL-MCNC: 8.7 MG/DL (ref 8.7–10.5)
CHLORIDE SERPL-SCNC: 100 MMOL/L (ref 95–110)
CO2 SERPL-SCNC: 27 MMOL/L (ref 23–29)
CREAT SERPL-MCNC: 1.7 MG/DL (ref 0.5–1.4)
DIFFERENTIAL METHOD: NORMAL
EOSINOPHIL # BLD AUTO: 0.1 K/UL (ref 0–0.5)
EOSINOPHIL NFR BLD: 0.7 % (ref 0–8)
ERYTHROCYTE [DISTWIDTH] IN BLOOD BY AUTOMATED COUNT: 13.8 % (ref 11.5–14.5)
EST. GFR  (AFRICAN AMERICAN): 53 ML/MIN/1.73 M^2
EST. GFR  (NON AFRICAN AMERICAN): 46 ML/MIN/1.73 M^2
GLUCOSE SERPL-MCNC: 102 MG/DL (ref 70–110)
HCT VFR BLD AUTO: 49.6 % (ref 40–54)
HGB BLD-MCNC: 16.6 G/DL (ref 14–18)
IMM GRANULOCYTES # BLD AUTO: 0.01 K/UL (ref 0–0.04)
IMM GRANULOCYTES NFR BLD AUTO: 0.1 % (ref 0–0.5)
LYMPHOCYTES # BLD AUTO: 2.2 K/UL (ref 1–4.8)
LYMPHOCYTES NFR BLD: 27 % (ref 18–48)
MAGNESIUM SERPL-MCNC: 2.4 MG/DL (ref 1.6–2.6)
MCH RBC QN AUTO: 30.6 PG (ref 27–31)
MCHC RBC AUTO-ENTMCNC: 33.5 G/DL (ref 32–36)
MCV RBC AUTO: 92 FL (ref 82–98)
MONOCYTES # BLD AUTO: 0.8 K/UL (ref 0.3–1)
MONOCYTES NFR BLD: 10.1 % (ref 4–15)
NEUTROPHILS # BLD AUTO: 4.9 K/UL (ref 1.8–7.7)
NEUTROPHILS NFR BLD: 61.7 % (ref 38–73)
NRBC BLD-RTO: 0 /100 WBC
PLATELET # BLD AUTO: 177 K/UL (ref 150–350)
PMV BLD AUTO: 10.7 FL (ref 9.2–12.9)
POTASSIUM SERPL-SCNC: 3.4 MMOL/L (ref 3.5–5.1)
RBC # BLD AUTO: 5.42 M/UL (ref 4.6–6.2)
SODIUM SERPL-SCNC: 137 MMOL/L (ref 136–145)
WBC # BLD AUTO: 8.01 K/UL (ref 3.9–12.7)

## 2020-01-02 PROCEDURE — 85025 COMPLETE CBC W/AUTO DIFF WBC: CPT

## 2020-01-02 PROCEDURE — 25000003 PHARM REV CODE 250: Performed by: INTERNAL MEDICINE

## 2020-01-02 PROCEDURE — 36415 COLL VENOUS BLD VENIPUNCTURE: CPT

## 2020-01-02 PROCEDURE — 80048 BASIC METABOLIC PNL TOTAL CA: CPT

## 2020-01-02 PROCEDURE — 63600175 PHARM REV CODE 636 W HCPCS: Performed by: INTERNAL MEDICINE

## 2020-01-02 PROCEDURE — 25000003 PHARM REV CODE 250: Performed by: NURSE PRACTITIONER

## 2020-01-02 PROCEDURE — 99231 PR SUBSEQUENT HOSPITAL CARE,LEVL I: ICD-10-PCS | Mod: ,,, | Performed by: INTERNAL MEDICINE

## 2020-01-02 PROCEDURE — 99231 SBSQ HOSP IP/OBS SF/LOW 25: CPT | Mod: ,,, | Performed by: INTERNAL MEDICINE

## 2020-01-02 PROCEDURE — 83735 ASSAY OF MAGNESIUM: CPT

## 2020-01-02 RX ORDER — POLYETHYLENE GLYCOL 3350 17 G/17G
17 POWDER, FOR SOLUTION ORAL DAILY
Status: DISCONTINUED | OUTPATIENT
Start: 2020-01-02 | End: 2020-01-02 | Stop reason: HOSPADM

## 2020-01-02 RX ORDER — HYDROCHLOROTHIAZIDE 25 MG/1
25 TABLET ORAL DAILY
Qty: 30 TABLET | Refills: 11 | Status: SHIPPED | OUTPATIENT
Start: 2020-01-03 | End: 2021-01-29 | Stop reason: SDUPTHER

## 2020-01-02 RX ORDER — AMLODIPINE BESYLATE 10 MG/1
10 TABLET ORAL DAILY
Qty: 30 TABLET | Refills: 11 | Status: SHIPPED | OUTPATIENT
Start: 2020-01-03 | End: 2021-01-28 | Stop reason: SDUPTHER

## 2020-01-02 RX ADMIN — HYDROCHLOROTHIAZIDE 25 MG: 25 TABLET ORAL at 09:01

## 2020-01-02 RX ADMIN — AMLODIPINE BESYLATE 10 MG: 10 TABLET ORAL at 09:01

## 2020-01-02 RX ADMIN — PANTOPRAZOLE SODIUM 40 MG: 40 TABLET, DELAYED RELEASE ORAL at 09:01

## 2020-01-02 RX ADMIN — POLYETHYLENE GLYCOL (3350) 17 G: 17 POWDER, FOR SOLUTION ORAL at 01:01

## 2020-01-02 RX ADMIN — SODIUM CHLORIDE: 0.9 INJECTION, SOLUTION INTRAVENOUS at 01:01

## 2020-01-02 RX ADMIN — DOCUSATE SODIUM 100 MG: 100 CAPSULE, LIQUID FILLED ORAL at 09:01

## 2020-01-02 RX ADMIN — HYDRALAZINE HYDROCHLORIDE 75 MG: 50 TABLET, FILM COATED ORAL at 05:01

## 2020-01-02 RX ADMIN — LABETALOL HYDROCHLORIDE 200 MG: 200 TABLET, FILM COATED ORAL at 09:01

## 2020-01-02 NOTE — TELEPHONE ENCOUNTER
Spoke with patient and scheduled hospital follow-up appointment for Monday, 01/13/2020 with OMEGA Jimenez at 0820 at [facility].

## 2020-01-02 NOTE — TELEPHONE ENCOUNTER
----- Message from Lissette Denise MD sent at 1/1/2020 10:36 AM CST -----  Please schedule patient for a post-hospital f/u appt with one of the GI providers in about 7-10 days.

## 2020-01-02 NOTE — DISCHARGE SUMMARY
[facility] Hospital Medicine  Discharge Summary      Patient Name: Grant Rodriguez  MRN: [id number]  Admission Date: 12/30/2019  Hospital Length of Stay: 3 days  Discharge Date and Time: 1/2/2020  2:09 PM  Attending Physician: Jacque att. providers found   Discharging Provider: García Pickering MD  Primary Care Provider: Fernando Jimenez MD      HPI:   50 YO AA male presenting to the hospital by private auto;  He complains of 2 days of nausea and vomiting and inability to keep anything down;   To day he reports several retching episodes and noted vomiting of bright red blood; He has a history of recently elevated blood pressures  3 weeks  Ago he was told to monitor his BP 3 times a day and was given hydralazine for PRN use;  He never recorded his blood pressures; He also was recently  told of having obstructive sleep apnea; however is not on any therapy for this.  On presentation to the ED he complains of headaches and had systolic BP   In excess of 220 , prompting institution of a cardene drip and subsequent admission to the ICU; Patient is also on therapy  With Dostinex for a pituitary tumor   with prolactinoma, diagnosed several years ago;  He see an endocrinologist at [facility] in [place]. He also has impaired glucose tolerance and is overweight.  Family history is notable for hypertension and diabetes;     * No surgery found *      Hospital Course:   Patient treated symptomatically with zofran for the nausea and started on a cardene drip; being transferred to the ICU for closer monitoring and furthert  Therapy; pulmonary medicine consulted; might benefit from C-pap therapy    12/31:  did well overnight with addition of C-PAP to the regimen;  Came off the Cardene drip and was placed on oral antihypertensive;    BP for the most part down and plans were for home with office follow up; However BP once again shot up to 180 systolic with resumed N/V; will therefore cancel D/C and move to telemetry,  for further evaluation and management;     1/1/20: Reviewed renal artery study from yesterday; suggestive for renal artery stenosis on the right;  Discussed with Dr. Soriano;   BP continues labile; anxious at times; did well with ativan overnight;  For his sleep apnea, discussed with Dr. Zhang and patient can be set up for outpatient follow up; Adjusting antihypertensive;  Avoiding ACEI at pressent    Patient 48 yo male admitted to hospital with hypertensive urgency. Patient improved with tx. Additionally with Hematemesis which resolved. Patient with prolactinoma treated with Cabergoline. LIAM referred to Nephrology for op support. Patient BP control achieved. Patient seen and examined today and deemed stable for a safe discharge to home.      Consults:   Consults (From admission, onward)        Status Ordering Provider     Inpatient consult to Cardiology  Once     Provider:  Phong Soriano MD    Completed URIEL THORNTON     Inpatient consult to Gastroenterology  Once     Provider:  Lissette Denise MD    Completed URIEL THORNTON     Inpatient consult to Pulmonology  Once     Provider:  Yared Zhang MD    Completed URIEL THORNTON     Inpatient consult to Social Work  Once     Provider:  (Not yet assigned)    Completed GREG GARCIA          No new Assessment & Plan notes have been filed under this hospital service since the last note was generated.  Service: Hospital Medicine    Final Active Diagnoses:    Diagnosis Date Noted POA    PRINCIPAL PROBLEM:  Hypertensive urgency [I16.0] 12/30/2019 Yes    VITALY (acute kidney injury) [N17.9] 01/01/2020 Yes    Elevated troponin [R79.89] 12/31/2019 Yes    JILLIAN (obstructive sleep apnea) [G47.33] 12/30/2019 Yes    Prolactinoma [D35.2]  Yes      Problems Resolved During this Admission:    Diagnosis Date Noted Date Resolved POA    Chest pain [R07.9] 12/30/2019 12/31/2019 Yes    Hematemesis with nausea [K92.0] 12/30/2019 12/31/2019 Yes    Leukocytosis [D72.829] 12/30/2019  01/02/2020 Yes       Discharged Condition: stable    Disposition: Home or Self Care    Follow Up:  Follow-up Information     [facility]    Specialty:  DME Provider  Why:  DME- CPAP  Contact information:  [address]  [facility] [address]  [phone]             Fernando Jimenez MD In 3 days.    Specialty:  Family Medicine  Contact information:  [address]  [place] [address]  [phone]                 Patient Instructions:      [facility] PATIENT ENTERED CPAP USAGE     Order Specific Question Answer Comments   Patient consents to share Ni CPAP usage and settings data with [facility]? Yes      CPAP FOR HOME USE     Order Specific Question Answer Comments   Type: Auto CPAP    Auto CPAP pressure setting range (cmH20): 5-20    Length of need (1-99 months): 99    Humidification: Heated    Type of mask: FFM    Headgear? Yes    Tubing? Yes    Humidifier chamber? Yes    Chin strap? Yes    Filters? Yes      Diet Cardiac     Activity as tolerated       Significant Diagnostic Studies: Labs:   BMP:   Recent Labs   Lab 01/01/20  0450 01/02/20  0441    102    137   K 3.7 3.4*   CL 97 100   CO2 25 27   BUN 24* 21*   CREATININE 2.0* 1.7*   CALCIUM 9.1 8.7   MG 2.4 2.4   , CMP   Recent Labs   Lab 01/01/20  0450 01/02/20  0441    137   K 3.7 3.4*   CL 97 100   CO2 25 27    102   BUN 24* 21*   CREATININE 2.0* 1.7*   CALCIUM 9.1 8.7   ANIONGAP 14 10   ESTGFRAFRICA 44* 53*   EGFRNONAA 38* 46*   , CBC   Recent Labs   Lab 01/01/20  0450 01/02/20  0441   WBC 8.90 8.01   HGB 17.2 16.6   HCT 51.2 49.6    177   , Troponin   Recent Labs   Lab 12/31/19  0113   TROPONINI 0.033*    and All labs within the past 24 hours have been reviewed    Pending Diagnostic Studies:     Procedure Component Value Units Date/Time    Aldosterone/Renin Activity Ratio [[id number]] Collected:  12/30/19 9098    Order Status:  Sent Lab Status:  In process Updated:  12/31/19 5881    Specimen:  Blood      Metanephrines, Plasma Free [[id number]] Collected:  12/30/19 4020    Order Status:  Sent Lab Status:  In process Updated:  12/31/19 0155    Specimen:  Blood          Medications:  Reconciled Home Medications:      Medication List      START taking these medications    amLODIPine 10 MG tablet  Commonly known as:  NORVASC  Take 1 tablet (10 mg total) by mouth once daily.  Start taking on:  January 3, 2020     hydroCHLOROthiazide 25 MG tablet  Commonly known as:  HYDRODIURIL  Take 1 tablet (25 mg total) by mouth once daily.  Start taking on:  January 3, 2020        CONTINUE taking these medications    cabergoline 0.5 mg tablet  Commonly known as:  DOSTINEX  Take 2 Tablets by mouth on Monday and Friday, and 1 tablet on wednesday     hydrALAZINE 25 MG tablet  Commonly known as:  APRESOLINE  Take 1 tablet (25 mg total) by mouth 3 (three) times daily.     sildenafil 20 mg Tab  Commonly known as:  REVATIO  Take 1-5 tablets as needed on an empty stomach with no alcohol an hour before desiring an erection.     testosterone cypionate 200 mg/mL injection  Commonly known as:  DEPOTESTOTERONE CYPIONATE  INJECT 1 ML INTO THE MUSCLE EVERY 21 DAYS            Indwelling Lines/Drains at time of discharge:   Lines/Drains/Airways     None                 Time spent on the discharge of patient: 37 minutes  Patient was seen and examined on the date of discharge and determined to be suitable for discharge.         García Pickering MD  Department of Hospital Medicine  [facility]

## 2020-01-02 NOTE — PLAN OF CARE
Jan8 Hospital Follow Up with Fernando Jimenez MD   Wednesday Jan 8, 2020 3:20 PM   Arrive at check-in approximately 15 minutes before your scheduled appointment time. Bring all outside medical records and imaging, along with a list of your current medications and insurance card.  2nd Floor - From [address], take the [facility] exit ([address]). Enter the facility from the [address]  [facility]   [address]  [place] [address]   [phone]    Patient received a CPAP from [facility] prior to discharge     01/02/20 1439   Final Note   Assessment Type Final Discharge Note   Anticipated Discharge Disposition Home   Hospital Follow Up  Appt(s) scheduled? Yes   Right Care Referral Info   Post Acute Recommendation No Care

## 2020-01-02 NOTE — PROGRESS NOTES
[facility]  Cardiology  Progress Note    Patient Name: Grant Rodriguez  MRN: [id number]  Admission Date: 12/30/2019  Hospital Length of Stay: 3 days  Code Status: Full Code   Attending Physician: García Pickering MD   Primary Care Physician: Fernando Jimenez MD  Expected Discharge Date: 1/2/2020  Principal Problem:Hypertensive urgency    Subjective:   Brief HPI:  Mr. Rodriguez is a 49 year old male patient whose current medical conditions include prolactinoma (on dostinex), tobacco abuse, JILLIAN, and recently diagnosed HTN who was sent to [facility] ED from primary care clinic yesterday due to uncontrolled HTN. Patient complained of generalized malaise/fatigue along with severe nausea and multiple episodes of emesis, some blood-tinged,  over the past 48 hours. He denied any associated abdominal pain, chest pain, SOB, palpitations, near syncope, or syncope. Initial workup in ED revealed markedly elevated /110 and patient was subsequently placed on cardene drip and admitted for further evaluation and treatment. Cardiology consulted to assist with management. Patient seen and examined today, lying in bed. Still feels poorly. Complains of nausea/GI upset. Remains chest pain free. States he was only recently diagnosed with elevated BP and was given hydralazine to use on prn basis and only took one dose prior to his admission. No prior cardiac history. Chart reviewed. Troponin 0.019>0.035>0.033. EKG reviewed and showed SR with non-specific T wave abnormality, LVH. 2D echo pending.    Of note, patient recently diagnosed with JILLIAN.       Hospital Course:   01/01/2020  BP controlled., still abd pain . Cr up to 2.0 Renal US showed possible right renal artery stenosis    1/2/2020  -patient had uneventful night. abd pain improved. Creatine stable at 1.7. No angina or equivalent. 2D echo showes LVEF 60%. Troponin flat 0.019, 0.035, 0.033. BP improved from admit.         Review of Systems   Constitution: Negative for  diaphoresis, malaise/fatigue, weight gain and weight loss.   HENT: Negative for congestion and nosebleeds.    Cardiovascular: Negative for chest pain, claudication, cyanosis, dyspnea on exertion, irregular heartbeat, leg swelling, near-syncope, orthopnea, palpitations, paroxysmal nocturnal dyspnea and syncope.   Respiratory: Negative for cough, hemoptysis, shortness of breath, sleep disturbances due to breathing, snoring, sputum production and wheezing.    Hematologic/Lymphatic: Negative for bleeding problem. Does not bruise/bleed easily.   Skin: Negative for rash.   Musculoskeletal: Negative for arthritis, back pain, falls, joint pain, muscle cramps and muscle weakness.   Gastrointestinal: Negative for abdominal pain, constipation, diarrhea, heartburn, hematemesis, hematochezia, melena, nausea and vomiting.   Genitourinary: Negative for dysuria, hematuria and nocturia.   Neurological: Negative for excessive daytime sleepiness, dizziness, headaches, light-headedness, loss of balance, numbness, vertigo and weakness.     Objective:     Vital Signs (Most Recent):  Temp: 99.3 °F (37.4 °C) (01/02/20 0940)  Pulse: 69 (01/02/20 1301)  Resp: 18 (01/02/20 0940)  BP: (!) 102/54 (01/02/20 0940)  SpO2: 96 % (01/02/20 0940) Vital Signs (24h Range):  Temp:  [98.1 °F (36.7 °C)-99.3 °F (37.4 °C)] 99.3 °F (37.4 °C)  Pulse:  [53-87] 69  Resp:  [18] 18  SpO2:  [88 %-98 %] 96 %  BP: (102-164)/(54-86) 102/54     Weight: 109.9 kg (242 lb 4.6 oz)  Body mass index is 35.78 kg/m².     SpO2: 96 %  O2 Device (Oxygen Therapy): room air      Intake/Output Summary (Last 24 hours) at 1/2/2020 1359  Last data filed at 1/2/2020 0400  Gross per 24 hour   Intake 2988 ml   Output --   Net 2988 ml       Lines/Drains/Airways     Peripheral Intravenous Line                 Peripheral IV - Single Lumen 01/02/20 0000 Left Antecubital less than 1 day                Physical Exam   Constitutional: He is oriented to person, place, and time. He appears  well-developed and well-nourished. No distress.   HENT:   Head: Normocephalic and atraumatic.   Eyes: Pupils are equal, round, and reactive to light. Right eye exhibits no discharge. Left eye exhibits no discharge.   Neck: Neck supple. No JVD present.   Cardiovascular: Normal rate, regular rhythm, S1 normal and S2 normal.   Murmur (RUSB SM) heard.  Pulmonary/Chest: Effort normal and breath sounds normal. No respiratory distress. He has no wheezes. He has no rales.   Abdominal: Soft. He exhibits no distension.   Musculoskeletal: He exhibits no edema.   Neurological: He is alert and oriented to person, place, and time.   Skin: Skin is warm and dry. He is not diaphoretic. No erythema.   Psychiatric: He has a normal mood and affect. His behavior is normal. Thought content normal.   Nursing note and vitals reviewed.      Significant Labs:   All pertinent lab results from the last 24 hours have been reviewed. and   Recent Lab Results       01/02/20  0441        Anion Gap 10     Baso # 0.03     Basophil% 0.4     BUN, Bld 21     Calcium 8.7     Chloride 100     CO2 27     Creatinine 1.7     Differential Method Automated     eGFR if  53     eGFR if non  46  Comment:  Calculation used to obtain the estimated glomerular filtration  rate (eGFR) is the CKD-EPI equation.        Eos # 0.1     Eosinophil% 0.7     Glucose 102     Gran # (ANC) 4.9     Gran% 61.7     Hematocrit 49.6     Hemoglobin 16.6     Immature Grans (Abs) 0.01  Comment:  Mild elevation in immature granulocytes is non specific and   can be seen in a variety of conditions including stress response,   acute inflammation, trauma and pregnancy. Correlation with other   laboratory and clinical findings is essential.       Immature Granulocytes 0.1     Lymph # 2.2     Lymph% 27.0     Magnesium 2.4     MCH 30.6     MCHC 33.5     MCV 92     Mono # 0.8     Mono% 10.1     MPV 10.7     nRBC 0     Platelets 177     Potassium 3.4     RBC 5.42      RDW 13.8     Sodium 137     WBC 8.01           Significant Imaging: Echocardiogram:   2D echo with color flow doppler:   Results for orders placed or performed during the hospital encounter of 12/30/19   2D echo with color flow doppler   Result Value Ref Range    QEF 60 55 - 65    Diastolic Dysfunction No     Narrative    Date of Procedure: 12/31/2019        TEST DESCRIPTION   Technical Quality: This is a technically challenging study.     Aorta: The aortic root is normal in size, measuring 3.3 cm at sinotubular junction and 3.4 cm at Sinuses of Valsalva. The proximal ascending aorta is normal in size, measuring 3.3 cm across.     Left Atrium: The left atrial volume index is mildly enlarged, measuring 38.30 cc/m2.     Left Ventricle: The left ventricle is normal in size, with an end-diastolic diameter of 4.6 cm, and an end-systolic diameter of 2.8 cm. LV wall thickness is normal, with the septum measuring 1.7 cm and the posterior wall measuring 1.6 cm across. Relative   wall thickness was increased at 0.70, and the LV mass index was increased at 176.1 g/m2 consistent with concentric left ventricular hypertrophy. There are no regional wall motion abnormalities. Left ventricular systolic function appears normal. Visually   estimated ejection fraction is 60-65%. The LV Doppler derived stroke volume equals 111.0 ccs.     Diastolic indices: E wave velocity 0.8 m/s, E/A ratio 1.1,  msec., E/e' ratio(avg) 7. Diastolic function is normal.     Right Atrium: The right atrium is normal in size, measuring 3.7 cm in length and 3.2 cm in width in the apical view.     Right Ventricle: The right ventricle is normal in size. Global right ventricular systolic function appears normal. Tricuspid annular plane systolic excursion (TAPSE) is 2.0 cm.     Aortic Valve:  Aortic valve is normal in structure with normal leaflet mobility. The mean gradient obtained across the aortic valve is 10 mmHg.     Mitral Valve:  Mitral valve is  normal in structure with normal leaflet mobility. The pressure half time is 73 msec. The calculated mitral valve area is 3.01 cm2.     Tricuspid Valve:  Tricuspid valve is normal in structure with normal leaflet mobility.     Pulmonary Valve:  Pulmonary valve is normal in structure with normal leaflet mobility.     IVC: IVC is normal in size and collapses > 50% with a sniff, suggesting normal right atrial pressure of 3 mmHg.     Intracavitary: There is no evidence of pericardial effusion, intracavity mass, thrombi, or vegetation.         CONCLUSIONS     1 - Mild left atrial enlargement.     2 - Concentric hypertrophy.     3 - No wall motion abnormalities.     4 - Normal left ventricular systolic function (EF 60-65%).     5 - Normal left ventricular diastolic function.     6 - Normal right ventricular systolic function .             This document has been electronically    SIGNED BY: Phong Soriano MD On: 12/31/2019 16:00     Assessment and Plan:       * Hypertensive urgency  BP controlled  -Continue amlodipine, labetalol, hydralazine, HCTZ  -needs renal artery CTA once kidney function improved.  -Counseled on low salt diet  -start IVF at 125 cc/hr    1/2/2020  -BP stable on current medical therapy.  -DC on current therapy   -Will need follow up as OP in Cardiology clinic       VITALY (acute kidney injury)  Renal function stable  OP follow up with Nephrology    Elevated troponin  -Troponin 0.019>0.035>0.033  -Mild elevation likely secondary to demand ischemia from uncontrolled HTN  -Consider ASA 81 mg daily if no further issues with hematemesis  -Continue amlodipine, BB, HCTZ  -Check 2D echo  -MPI stress test as OP once BP is controlled    1/2/2020  -Troponin leak likely secondary to uncontrolled HTN on admit  -Recommend OP stress test to rule out ischemia   -He will follow up in Cardiology clinic in 1-2 weeks  -DC home with amlodipine, BB, HCTZ  And hydralazine for BP control     JILLIAN (obstructive sleep  apnea)  -Contributing to elevated BP  -Nightly CPAP usage    Prolactinoma  -Mgmt as per hospital medicine        VTE Risk Mitigation (From admission, onward)         Ordered     Place sequential compression device  Until discontinued      12/31/19 0913     Place sequential compression device  Until discontinued      12/30/19 1547     Place GERDA hose  Until discontinued      12/30/19 1547     Place sequential compression device  Until discontinued      12/30/19 1547              Chart reviewed. Patient examined by Dr. Soriano and agrees with plan that has been outlined.     Patricia Retana, BURTON  Cardiology  [facility]

## 2020-01-02 NOTE — NURSING
Discharge instructions reviewed with patient and spouse. All questions answered. Catheters removed and intact.Heart monitor removed and returned. Patient to walk out with spouse.

## 2020-01-02 NOTE — SUBJECTIVE & OBJECTIVE
Review of Systems   Constitution: Negative for diaphoresis, malaise/fatigue, weight gain and weight loss.   HENT: Negative for congestion and nosebleeds.    Cardiovascular: Negative for chest pain, claudication, cyanosis, dyspnea on exertion, irregular heartbeat, leg swelling, near-syncope, orthopnea, palpitations, paroxysmal nocturnal dyspnea and syncope.   Respiratory: Negative for cough, hemoptysis, shortness of breath, sleep disturbances due to breathing, snoring, sputum production and wheezing.    Hematologic/Lymphatic: Negative for bleeding problem. Does not bruise/bleed easily.   Skin: Negative for rash.   Musculoskeletal: Negative for arthritis, back pain, falls, joint pain, muscle cramps and muscle weakness.   Gastrointestinal: Negative for abdominal pain, constipation, diarrhea, heartburn, hematemesis, hematochezia, melena, nausea and vomiting.   Genitourinary: Negative for dysuria, hematuria and nocturia.   Neurological: Negative for excessive daytime sleepiness, dizziness, headaches, light-headedness, loss of balance, numbness, vertigo and weakness.     Objective:     Vital Signs (Most Recent):  Temp: 99.3 °F (37.4 °C) (01/02/20 0940)  Pulse: 69 (01/02/20 1301)  Resp: 18 (01/02/20 0940)  BP: (!) 102/54 (01/02/20 0940)  SpO2: 96 % (01/02/20 0940) Vital Signs (24h Range):  Temp:  [98.1 °F (36.7 °C)-99.3 °F (37.4 °C)] 99.3 °F (37.4 °C)  Pulse:  [53-87] 69  Resp:  [18] 18  SpO2:  [88 %-98 %] 96 %  BP: (102-164)/(54-86) 102/54     Weight: 109.9 kg (242 lb 4.6 oz)  Body mass index is 35.78 kg/m².     SpO2: 96 %  O2 Device (Oxygen Therapy): room air      Intake/Output Summary (Last 24 hours) at 1/2/2020 1359  Last data filed at 1/2/2020 0400  Gross per 24 hour   Intake 2988 ml   Output --   Net 2988 ml       Lines/Drains/Airways     Peripheral Intravenous Line                 Peripheral IV - Single Lumen 01/02/20 0000 Left Antecubital less than 1 day                Physical Exam   Constitutional: He is  oriented to person, place, and time. He appears well-developed and well-nourished. No distress.   HENT:   Head: Normocephalic and atraumatic.   Eyes: Pupils are equal, round, and reactive to light. Right eye exhibits no discharge. Left eye exhibits no discharge.   Neck: Neck supple. No JVD present.   Cardiovascular: Normal rate, regular rhythm, S1 normal and S2 normal.   Murmur (RUSB SM) heard.  Pulmonary/Chest: Effort normal and breath sounds normal. No respiratory distress. He has no wheezes. He has no rales.   Abdominal: Soft. He exhibits no distension.   Musculoskeletal: He exhibits no edema.   Neurological: He is alert and oriented to person, place, and time.   Skin: Skin is warm and dry. He is not diaphoretic. No erythema.   Psychiatric: He has a normal mood and affect. His behavior is normal. Thought content normal.   Nursing note and vitals reviewed.      Significant Labs:   All pertinent lab results from the last 24 hours have been reviewed. and   Recent Lab Results       01/02/20  0441        Anion Gap 10     Baso # 0.03     Basophil% 0.4     BUN, Bld 21     Calcium 8.7     Chloride 100     CO2 27     Creatinine 1.7     Differential Method Automated     eGFR if  53     eGFR if non  46  Comment:  Calculation used to obtain the estimated glomerular filtration  rate (eGFR) is the CKD-EPI equation.        Eos # 0.1     Eosinophil% 0.7     Glucose 102     Gran # (ANC) 4.9     Gran% 61.7     Hematocrit 49.6     Hemoglobin 16.6     Immature Grans (Abs) 0.01  Comment:  Mild elevation in immature granulocytes is non specific and   can be seen in a variety of conditions including stress response,   acute inflammation, trauma and pregnancy. Correlation with other   laboratory and clinical findings is essential.       Immature Granulocytes 0.1     Lymph # 2.2     Lymph% 27.0     Magnesium 2.4     MCH 30.6     MCHC 33.5     MCV 92     Mono # 0.8     Mono% 10.1     MPV 10.7     nRBC 0      Platelets 177     Potassium 3.4     RBC 5.42     RDW 13.8     Sodium 137     WBC 8.01           Significant Imaging: Echocardiogram:   2D echo with color flow doppler:   Results for orders placed or performed during the hospital encounter of 12/30/19   2D echo with color flow doppler   Result Value Ref Range    QEF 60 55 - 65    Diastolic Dysfunction No     Narrative    Date of Procedure: 12/31/2019        TEST DESCRIPTION   Technical Quality: This is a technically challenging study.     Aorta: The aortic root is normal in size, measuring 3.3 cm at sinotubular junction and 3.4 cm at Sinuses of Valsalva. The proximal ascending aorta is normal in size, measuring 3.3 cm across.     Left Atrium: The left atrial volume index is mildly enlarged, measuring 38.30 cc/m2.     Left Ventricle: The left ventricle is normal in size, with an end-diastolic diameter of 4.6 cm, and an end-systolic diameter of 2.8 cm. LV wall thickness is normal, with the septum measuring 1.7 cm and the posterior wall measuring 1.6 cm across. Relative   wall thickness was increased at 0.70, and the LV mass index was increased at 176.1 g/m2 consistent with concentric left ventricular hypertrophy. There are no regional wall motion abnormalities. Left ventricular systolic function appears normal. Visually   estimated ejection fraction is 60-65%. The LV Doppler derived stroke volume equals 111.0 ccs.     Diastolic indices: E wave velocity 0.8 m/s, E/A ratio 1.1,  msec., E/e' ratio(avg) 7. Diastolic function is normal.     Right Atrium: The right atrium is normal in size, measuring 3.7 cm in length and 3.2 cm in width in the apical view.     Right Ventricle: The right ventricle is normal in size. Global right ventricular systolic function appears normal. Tricuspid annular plane systolic excursion (TAPSE) is 2.0 cm.     Aortic Valve:  Aortic valve is normal in structure with normal leaflet mobility. The mean gradient obtained across the aortic valve  is 10 mmHg.     Mitral Valve:  Mitral valve is normal in structure with normal leaflet mobility. The pressure half time is 73 msec. The calculated mitral valve area is 3.01 cm2.     Tricuspid Valve:  Tricuspid valve is normal in structure with normal leaflet mobility.     Pulmonary Valve:  Pulmonary valve is normal in structure with normal leaflet mobility.     IVC: IVC is normal in size and collapses > 50% with a sniff, suggesting normal right atrial pressure of 3 mmHg.     Intracavitary: There is no evidence of pericardial effusion, intracavity mass, thrombi, or vegetation.         CONCLUSIONS     1 - Mild left atrial enlargement.     2 - Concentric hypertrophy.     3 - No wall motion abnormalities.     4 - Normal left ventricular systolic function (EF 60-65%).     5 - Normal left ventricular diastolic function.     6 - Normal right ventricular systolic function .             This document has been electronically    SIGNED BY: Phong Soriano MD On: 12/31/2019 16:00

## 2020-01-02 NOTE — TELEPHONE ENCOUNTER
Attempted to contact patient at [phone] with no answer. Left voicemail message for patient to return call.     Re: scheduling hospital follow-up appointment.

## 2020-01-02 NOTE — ASSESSMENT & PLAN NOTE
-Troponin 0.019>0.035>0.033  -Mild elevation likely secondary to demand ischemia from uncontrolled HTN  -Consider ASA 81 mg daily if no further issues with hematemesis  -Continue amlodipine, BB, HCTZ  -Check 2D echo  -MPI stress test as OP once BP is controlled    1/2/2020  -Troponin leak likely secondary to uncontrolled HTN on admit  -Recommend OP stress test to rule out ischemia   -He will follow up in Cardiology clinic in 1-2 weeks  -DC home with amlodipine, BB, HCTZ  And hydralazine for BP control

## 2020-01-02 NOTE — ASSESSMENT & PLAN NOTE
BP controlled  -Continue amlodipine, labetalol, hydralazine, HCTZ  -needs renal artery CTA once kidney function improved.  -Counseled on low salt diet  -start IVF at 125 cc/hr    1/2/2020  -BP stable on current medical therapy.  -DC on current therapy   -Will need follow up as OP in Cardiology clinic

## 2020-01-02 NOTE — PLAN OF CARE
Patient AAOX 4. VSS..  Patient remained afebrile throughout shift.  Patient remained free of falls this shift  Patient 0/10 of pain this shift  Plan of care reviewed.  Patient verbalized understanding.  Patient moving/turning independently  Frequent weight shifting encouraged.  Patient NSR on monitor  Bed low, side rails up x2, wheels locked, call light in reach.  Patient instructed to call for assistance.  Hourly rounding completed.  Will continue to monitor.

## 2020-01-03 LAB
ALDOST SERPL-MCNC: 3.2 NG/DL
ALDOST/RENIN PLAS-RTO: 10.8 RATIO
RENIN PLAS-CCNC: 0.3 NG/ML/HR

## 2020-01-04 LAB
METANEPH FREE SERPL-MCNC: 48 PG/ML
METANEPHS SERPL-MCNC: 293 PG/ML
NORMETANEPH FREE SERPL-MCNC: 245 PG/ML

## 2020-01-06 ENCOUNTER — PATIENT OUTREACH (OUTPATIENT)
Dept: ADMINISTRATIVE | Facility: CLINIC | Age: 50
End: 2020-01-06

## 2020-01-06 PROBLEM — I70.1 RENAL ARTERY STENOSIS: Status: ACTIVE | Noted: 2020-01-06

## 2020-01-07 ENCOUNTER — OFFICE VISIT (OUTPATIENT)
Dept: NEPHROLOGY | Facility: CLINIC | Age: 50
End: 2020-01-07
Payer: COMMERCIAL

## 2020-01-07 VITALS
DIASTOLIC BLOOD PRESSURE: 78 MMHG | HEART RATE: 87 BPM | HEIGHT: 69 IN | SYSTOLIC BLOOD PRESSURE: 118 MMHG | OXYGEN SATURATION: 98 % | BODY MASS INDEX: 37.45 KG/M2 | WEIGHT: 252.88 LBS

## 2020-01-07 DIAGNOSIS — E87.6 HYPOKALEMIA: ICD-10-CM

## 2020-01-07 DIAGNOSIS — I10 ESSENTIAL HYPERTENSION: ICD-10-CM

## 2020-01-07 DIAGNOSIS — N18.30 STAGE 3 CHRONIC KIDNEY DISEASE: Primary | ICD-10-CM

## 2020-01-07 DIAGNOSIS — R80.1 PERSISTENT PROTEINURIA: ICD-10-CM

## 2020-01-07 PROCEDURE — 99204 OFFICE O/P NEW MOD 45 MIN: CPT | Mod: S$GLB,,, | Performed by: INTERNAL MEDICINE

## 2020-01-07 PROCEDURE — 3074F PR MOST RECENT SYSTOLIC BLOOD PRESSURE < 130 MM HG: ICD-10-PCS | Mod: CPTII,S$GLB,, | Performed by: INTERNAL MEDICINE

## 2020-01-07 PROCEDURE — 3008F BODY MASS INDEX DOCD: CPT | Mod: CPTII,S$GLB,, | Performed by: INTERNAL MEDICINE

## 2020-01-07 PROCEDURE — 3078F PR MOST RECENT DIASTOLIC BLOOD PRESSURE < 80 MM HG: ICD-10-PCS | Mod: CPTII,S$GLB,, | Performed by: INTERNAL MEDICINE

## 2020-01-07 PROCEDURE — 3008F PR BODY MASS INDEX (BMI) DOCUMENTED: ICD-10-PCS | Mod: CPTII,S$GLB,, | Performed by: INTERNAL MEDICINE

## 2020-01-07 PROCEDURE — 3078F DIAST BP <80 MM HG: CPT | Mod: CPTII,S$GLB,, | Performed by: INTERNAL MEDICINE

## 2020-01-07 PROCEDURE — 99204 PR OFFICE/OUTPT VISIT, NEW, LEVL IV, 45-59 MIN: ICD-10-PCS | Mod: S$GLB,,, | Performed by: INTERNAL MEDICINE

## 2020-01-07 PROCEDURE — 3074F SYST BP LT 130 MM HG: CPT | Mod: CPTII,S$GLB,, | Performed by: INTERNAL MEDICINE

## 2020-01-07 PROCEDURE — 99999 PR PBB SHADOW E&M-EST. PATIENT-LVL III: CPT | Mod: PBBFAC,,, | Performed by: INTERNAL MEDICINE

## 2020-01-07 PROCEDURE — 99999 PR PBB SHADOW E&M-EST. PATIENT-LVL III: ICD-10-PCS | Mod: PBBFAC,,, | Performed by: INTERNAL MEDICINE

## 2020-01-07 RX ORDER — POTASSIUM CHLORIDE 750 MG/1
20 TABLET, EXTENDED RELEASE ORAL DAILY
Qty: 90 TABLET | Refills: 1 | Status: SHIPPED | OUTPATIENT
Start: 2020-01-07 | End: 2020-04-13

## 2020-01-07 NOTE — LETTER
January 7, 2020      Fernando Jimenez MD  [address]  [place] [address]           [facility]  [address] [place] [id number]  Phone: [phone]  Fax: [phone]          Patient: Grant Rodriguez   MR Number: [id number]   YOB: 1970   Date of Visit: 1/7/2020       Dear Dr. Fernando Jimenez:    Thank you for referring Grant Rodriguez to me for evaluation. Attached you will find relevant portions of my assessment and plan of care.    If you have questions, please do not hesitate to call me. I look forward to following Grnat Rodriguez along with you.    Sincerely,    Colette Haider MD    Enclosure  CC:  No Recipients    If you would like to receive this communication electronically, please contact [email or web address] or [phone] to request more information on [employer] access.    For providers and/or their staff who would like to refer a patient to [facility], please contact us through our one-stop-shop provider referral line, [facility], at [phone].    If you feel you have received this communication in error or would no longer like to receive these types of communications, please e-mail [email or web address]

## 2020-01-07 NOTE — PROGRESS NOTES
"Subjective:       Patient ID: Grant Rodriguez is a 49 y.o. Black or  male who presents for new evaluation of Chronic Kidney Disease    HPI     Patient is a 49-year-old male with longstanding history of prolactinoma.  Also followed by Dr. Mejia in the Urology division for Low testosterone level.  Has history of Renal cyst-simple.  History of  Hypertension Dx in 12/2019 with high BP >200.  Creatinine ranging between 1.2 and 1.4 many years ago.     For January 2020 comes for consultation with rise in creatinine now 1.7 2+ Upro.  CT scan done in December 2019 shows simple cyst in the left kidney. Lost 20 ib in 2 years ; Hx of NS as a child s/p steroids proceed with a kidney biopsy    Review of Systems   Constitutional: Negative.  Negative for activity change, appetite change, chills, diaphoresis, fatigue and fever.   HENT: Negative.  Negative for congestion and trouble swallowing.    Eyes: Negative.    Respiratory: Negative.  Negative for cough, chest tightness, shortness of breath and wheezing.    Cardiovascular: Negative.  Negative for chest pain, palpitations and leg swelling.   Gastrointestinal: Negative.  Negative for abdominal distention, abdominal pain, nausea and vomiting.   Genitourinary: Negative.  Negative for decreased urine volume, difficulty urinating, dysuria, enuresis, flank pain, frequency, hematuria, penile swelling, scrotal swelling and urgency.   Musculoskeletal: Negative.  Negative for arthralgias, back pain, joint swelling and neck pain.   Skin: Negative for rash.   Neurological: Negative.  Negative for tremors, seizures and headaches.   Psychiatric/Behavioral: Negative.  Negative for confusion and sleep disturbance. The patient is not nervous/anxious.    All other systems reviewed and are negative.      Objective:   /78   Pulse 87   Ht 5' 9" (1.753 m)   Wt 114.7 kg (252 lb 13.9 oz)   SpO2 98%   BMI 37.34 kg/m²      Physical Exam   Constitutional: He is oriented to " person, place, and time. He appears well-developed and well-nourished. No distress.   HENT:   Head: Normocephalic.   Eyes: Pupils are equal, round, and reactive to light. EOM are normal.   Neck: Normal range of motion. Neck supple. No JVD present. No thyromegaly present.   Cardiovascular: Normal rate, regular rhythm, S1 normal, S2 normal, normal heart sounds and intact distal pulses. PMI is not displaced. Exam reveals no gallop and no friction rub.   No murmur heard.  Pulmonary/Chest: Effort normal and breath sounds normal. No respiratory distress. He has no wheezes. He has no rales. He exhibits no tenderness.   Abdominal: He exhibits no distension and no mass. There is no hepatosplenomegaly. There is no tenderness. There is no rebound and no CVA tenderness. No hernia.   Musculoskeletal: Normal range of motion. He exhibits no edema or tenderness.   Lymphadenopathy:     He has no cervical adenopathy.   Neurological: He is alert and oriented to person, place, and time. He has normal reflexes. He is not disoriented. He displays normal reflexes. No cranial nerve deficit. He exhibits normal muscle tone. Coordination normal.   Skin: Skin is warm and dry. No rash noted. No erythema.   Psychiatric: He has a normal mood and affect. His behavior is normal. Judgment and thought content normal.   Nursing note and vitals reviewed.        Lab Results   Component Value Date    CREATININE 1.7 (H) 01/02/2020    BUN 21 (H) 01/02/2020     01/02/2020    K 3.4 (L) 01/02/2020     01/02/2020    CO2 27 01/02/2020     Lab Results   Component Value Date    WBC 8.01 01/02/2020    HGB 16.6 01/02/2020    HCT 49.6 01/02/2020    MCV 92 01/02/2020     01/02/2020     Lab Results   Component Value Date    CALCIUM 8.7 01/02/2020    PHOS 4.4 12/31/2019         Assessment:    )    1. Stage 3 chronic kidney disease    2. Hypokalemia    3. Essential hypertension    4. Persistent proteinuria        Plan:         worsening creatinine  with new onset of proteinuria with a history of nephrotic syndrome as a child status post steroids.  Proceed with a diagnostic kidney biopsy at this time.  Blood pressure is well controlled.  Potassium chloride is low due to diuretics will prescribe potassium chloride for now continue with the current medications until follow-up in 2-3 weeks

## 2020-01-08 NOTE — PROGRESS NOTES
Subjective:   Patient ID:  Grant Rodriguez is a 49 y.o. male who presents for evaluation of Hospital Follow Up      HPI     Grant Rodriguez is a 49 year old male who presents to clinic for hospital follow up. He was recently admitted to [facility] due to uncontrolled HTN issues with nausea, emesis over the previous 48 hours. BP in ED was 213/110 and was started on Cardene gtt for BP control. He was discharged home after Bp control achieved.     He has quit drinking etoh since hospital stay. He is trying to get back in the gym on a regular basis. He continues to smoke about 1 ppd and he endorses daily marijuana use.     His current medical conditions include HTN, Prolactinoma, IGT, Obesity, CKD Stage III, Low testosterone, proteinuria. He returns today and states he is doing ok.     Denies chest pain or anginal equivalents. No shortness of breath, LR or palpitations. Denies orthopnea, PND or abdominal bloating. Reports regular walking without any issues lately. NO leg swelling or claudications. No recent falls, syncope or near syncopal events. Reports compliance with medications and dietary restrictions. NO CNS complaints to suggest TIA or CVA today. No signs of abnormal bleeding.       Past Medical History:   Diagnosis Date    Hypertensive urgency 12/30/2019    Hypogonadism, male     IGT (impaired glucose tolerance)     Obesity     Prolactinoma        History reviewed. No pertinent surgical history.    Social History     Tobacco Use    Smoking status: Current Every Day Smoker     Packs/day: 1.00     Years: 20.00     Pack years: 20.00   Substance Use Topics    Alcohol use: No     Frequency: 2-4 times a month     Drinks per session: 1 or 2     Binge frequency: Less than monthly    Drug use: No       Family History   Problem Relation Age of Onset    Diabetes Father     Hypertension Father     Thyroid disease Neg Hx     Calcium disorder Neg Hx      Wt Readings from Last 3 Encounters:   01/09/20 115.7 kg (255  lb 1.2 oz)   01/07/20 114.7 kg (252 lb 13.9 oz)   01/02/20 109.9 kg (242 lb 4.6 oz)     Temp Readings from Last 3 Encounters:   01/02/20 99.3 °F (37.4 °C) (Oral)   12/30/19 98.1 °F (36.7 °C) (Tympanic)   12/02/19 96.9 °F (36.1 °C) (Tympanic)     BP Readings from Last 3 Encounters:   01/09/20 112/78   01/07/20 118/78   01/02/20 (!) 102/54     Pulse Readings from Last 3 Encounters:   01/09/20 86   01/07/20 87   01/02/20 69     Current Outpatient Medications on File Prior to Visit   Medication Sig Dispense Refill    amLODIPine (NORVASC) 10 MG tablet Take 1 tablet (10 mg total) by mouth once daily. 30 tablet 11    cabergoline (DOSTINEX) 0.5 mg tablet Take 2 Tablets by mouth on Monday and Friday, and 1 tablet on wednesday 48 tablet 11    hydrALAZINE (APRESOLINE) 25 MG tablet Take 1 tablet (25 mg total) by mouth 3 (three) times daily. 90 tablet 11    hydroCHLOROthiazide (HYDRODIURIL) 25 MG tablet Take 1 tablet (25 mg total) by mouth once daily. 30 tablet 11    potassium chloride SA (K-DUR,KLOR-CON) 10 MEQ tablet Take 2 tablets (20 mEq total) by mouth once daily. 90 tablet 1    testosterone cypionate (DEPOTESTOTERONE CYPIONATE) 200 mg/mL injection INJECT 1 ML INTO THE MUSCLE EVERY 21 DAYS 10 mL 1    sildenafil (REVATIO) 20 mg Tab Take 1-5 tablets as needed on an empty stomach with no alcohol an hour before desiring an erection. 50 tablet 11     No current facility-administered medications on file prior to visit.        Review of Systems   Constitution: Negative for malaise/fatigue.   HENT: Negative for hearing loss and hoarse voice.    Eyes: Negative for blurred vision and visual disturbance.   Cardiovascular: Negative for chest pain, claudication, dyspnea on exertion, irregular heartbeat, leg swelling, near-syncope, orthopnea, palpitations, paroxysmal nocturnal dyspnea and syncope.   Respiratory: Positive for snoring. Negative for cough, hemoptysis, shortness of breath, sleep disturbances due to breathing and  "wheezing.         +dickson on cpap   Endocrine: Negative for cold intolerance and heat intolerance.   Hematologic/Lymphatic: Does not bruise/bleed easily.   Skin: Negative for color change, dry skin and nail changes.   Musculoskeletal: Positive for arthritis. Negative for back pain, joint pain and myalgias.   Gastrointestinal: Negative for bloating, abdominal pain, constipation, nausea and vomiting.   Genitourinary: Negative for dysuria, flank pain, hematuria and hesitancy.   Neurological: Negative for headaches, light-headedness, loss of balance, numbness, paresthesias and weakness.   Psychiatric/Behavioral: Negative for altered mental status.   Allergic/Immunologic: Negative for environmental allergies.     /78 (BP Location: Right arm, Patient Position: Sitting)   Pulse 86   Ht 5' 9" (1.753 m)   Wt 115.7 kg (255 lb 1.2 oz)   SpO2 99%   BMI 37.67 kg/m²     Objective:   Physical Exam   Constitutional: He is oriented to person, place, and time. He appears well-developed and well-nourished. No distress.   HENT:   Head: Normocephalic and atraumatic.   Eyes: Pupils are equal, round, and reactive to light.   Neck: Normal range of motion and full passive range of motion without pain. Neck supple. No JVD present. No tracheal deviation present. No thyromegaly present.   Cardiovascular: Normal rate, regular rhythm, S1 normal, S2 normal and intact distal pulses. PMI is not displaced. Exam reveals no distant heart sounds.   No murmur heard.  Pulses:       Radial pulses are 2+ on the right side, and 2+ on the left side.        Dorsalis pedis pulses are 2+ on the right side, and 2+ on the left side.   CHEST PAIN FREE   Pulmonary/Chest: Effort normal and breath sounds normal. No accessory muscle usage. No respiratory distress. He has no decreased breath sounds. He has no wheezes. He has no rales.   Abdominal: Soft. Bowel sounds are normal. He exhibits no distension. There is no tenderness.   Musculoskeletal: Normal range " of motion. He exhibits no edema.        Right ankle: He exhibits no swelling.        Left ankle: He exhibits no swelling.   Neurological: He is alert and oriented to person, place, and time.   Skin: Skin is warm and dry. He is not diaphoretic. No cyanosis. Nails show no clubbing.   Psychiatric: He has a normal mood and affect. His speech is normal and behavior is normal. Judgment and thought content normal. Cognition and memory are normal.   Nursing note and vitals reviewed.      Lab Results   Component Value Date    CHOL 173 12/02/2019    CHOL 150 05/29/2018    CHOL 170 02/02/2015     Lab Results   Component Value Date    HDL 55 12/02/2019    HDL 42 05/29/2018    HDL 56 02/02/2015     Lab Results   Component Value Date    LDLCALC 97.2 12/02/2019    LDLCALC 92.6 05/29/2018    LDLCALC 102.2 02/02/2015     Lab Results   Component Value Date    TRIG 104 12/02/2019    TRIG 77 05/29/2018    TRIG 59 02/02/2015     Lab Results   Component Value Date    CHOLHDL 31.8 12/02/2019    CHOLHDL 28.0 05/29/2018    CHOLHDL 32.9 02/02/2015       Chemistry        Component Value Date/Time     01/02/2020 0441    K 3.4 (L) 01/02/2020 0441     01/02/2020 0441    CO2 27 01/02/2020 0441    BUN 21 (H) 01/02/2020 0441    CREATININE 1.7 (H) 01/02/2020 0441     01/02/2020 0441        Component Value Date/Time    CALCIUM 8.7 01/02/2020 0441    ALKPHOS 71 12/30/2019 1329    AST 26 12/30/2019 1329    ALT 24 12/30/2019 1329    BILITOT 1.0 12/30/2019 1329    ESTGFRAFRICA 53 (A) 01/02/2020 0441    EGFRNONAA 46 (A) 01/02/2020 0441          Lab Results   Component Value Date    TSH 1.528 12/30/2019     Lab Results   Component Value Date    INR 1.1 12/30/2019     Lab Results   Component Value Date    WBC 8.01 01/02/2020    HGB 16.6 01/02/2020    HCT 49.6 01/02/2020    MCV 92 01/02/2020     01/02/2020      Assessment:      1. Hypertensive urgency    2. Elevated troponin    3. Renal artery stenosis    4. Stage 3 chronic kidney  disease    5. IGT (impaired glucose tolerance)    6. JILLIAN (obstructive sleep apnea)      Patient presents to clinic for hospital follow up and is doing well today.  BP well controlled today  NO chest pain or SOB today  Plan:     Continue same CV meds   Continue nightly CPAP  F/U with Nephrology for ongoing CKD  Dash diet, 2 gm sodium restriction  1500 ml fluid restriction  Encourage regular physical exercise  Encourage weight loss  RTC in 6 months or sooner if needed    RICHARD Crisostomo  [facility]

## 2020-01-09 ENCOUNTER — OFFICE VISIT (OUTPATIENT)
Dept: CARDIOLOGY | Facility: CLINIC | Age: 50
End: 2020-01-09
Payer: COMMERCIAL

## 2020-01-09 VITALS
OXYGEN SATURATION: 99 % | DIASTOLIC BLOOD PRESSURE: 78 MMHG | BODY MASS INDEX: 37.78 KG/M2 | HEART RATE: 86 BPM | WEIGHT: 255.06 LBS | SYSTOLIC BLOOD PRESSURE: 112 MMHG | HEIGHT: 69 IN

## 2020-01-09 DIAGNOSIS — I16.0 HYPERTENSIVE URGENCY: Primary | ICD-10-CM

## 2020-01-09 DIAGNOSIS — G47.33 OSA (OBSTRUCTIVE SLEEP APNEA): ICD-10-CM

## 2020-01-09 DIAGNOSIS — R79.89 ELEVATED TROPONIN: ICD-10-CM

## 2020-01-09 DIAGNOSIS — R73.02 IGT (IMPAIRED GLUCOSE TOLERANCE): ICD-10-CM

## 2020-01-09 DIAGNOSIS — N18.30 STAGE 3 CHRONIC KIDNEY DISEASE: ICD-10-CM

## 2020-01-09 DIAGNOSIS — I70.1 RENAL ARTERY STENOSIS: ICD-10-CM

## 2020-01-09 PROCEDURE — 3078F PR MOST RECENT DIASTOLIC BLOOD PRESSURE < 80 MM HG: ICD-10-PCS | Mod: CPTII,S$GLB,, | Performed by: NURSE PRACTITIONER

## 2020-01-09 PROCEDURE — 3074F PR MOST RECENT SYSTOLIC BLOOD PRESSURE < 130 MM HG: ICD-10-PCS | Mod: CPTII,S$GLB,, | Performed by: NURSE PRACTITIONER

## 2020-01-09 PROCEDURE — 3074F SYST BP LT 130 MM HG: CPT | Mod: CPTII,S$GLB,, | Performed by: NURSE PRACTITIONER

## 2020-01-09 PROCEDURE — 3008F PR BODY MASS INDEX (BMI) DOCUMENTED: ICD-10-PCS | Mod: CPTII,S$GLB,, | Performed by: NURSE PRACTITIONER

## 2020-01-09 PROCEDURE — 99214 OFFICE O/P EST MOD 30 MIN: CPT | Mod: S$GLB,,, | Performed by: NURSE PRACTITIONER

## 2020-01-09 PROCEDURE — 99214 PR OFFICE/OUTPT VISIT, EST, LEVL IV, 30-39 MIN: ICD-10-PCS | Mod: S$GLB,,, | Performed by: NURSE PRACTITIONER

## 2020-01-09 PROCEDURE — 3008F BODY MASS INDEX DOCD: CPT | Mod: CPTII,S$GLB,, | Performed by: NURSE PRACTITIONER

## 2020-01-09 PROCEDURE — 99999 PR PBB SHADOW E&M-EST. PATIENT-LVL III: CPT | Mod: PBBFAC,,, | Performed by: NURSE PRACTITIONER

## 2020-01-09 PROCEDURE — 3078F DIAST BP <80 MM HG: CPT | Mod: CPTII,S$GLB,, | Performed by: NURSE PRACTITIONER

## 2020-01-09 PROCEDURE — 99999 PR PBB SHADOW E&M-EST. PATIENT-LVL III: ICD-10-PCS | Mod: PBBFAC,,, | Performed by: NURSE PRACTITIONER

## 2020-01-16 ENCOUNTER — TELEPHONE (OUTPATIENT)
Dept: RADIOLOGY | Facility: HOSPITAL | Age: 50
End: 2020-01-16

## 2020-01-16 DIAGNOSIS — D35.3 BENIGN NEOPLASM OF PITUITARY GLAND AND CRANIOPHARYNGEAL DUCT (POUCH): ICD-10-CM

## 2020-01-16 DIAGNOSIS — D35.2 BENIGN NEOPLASM OF PITUITARY GLAND AND CRANIOPHARYNGEAL DUCT (POUCH): ICD-10-CM

## 2020-01-16 RX ORDER — CABERGOLINE 0.5 MG/1
TABLET ORAL
Qty: 48 TABLET | Refills: 11 | Status: SHIPPED | OUTPATIENT
Start: 2020-01-16 | End: 2020-02-10 | Stop reason: SDUPTHER

## 2020-01-17 ENCOUNTER — HOSPITAL ENCOUNTER (OUTPATIENT)
Dept: RADIOLOGY | Facility: HOSPITAL | Age: 50
Discharge: HOME | End: 2020-01-17
Attending: INTERNAL MEDICINE
Payer: COMMERCIAL

## 2020-01-17 VITALS
OXYGEN SATURATION: 100 % | WEIGHT: 260 LBS | HEART RATE: 94 BPM | SYSTOLIC BLOOD PRESSURE: 148 MMHG | DIASTOLIC BLOOD PRESSURE: 72 MMHG | RESPIRATION RATE: 16 BRPM | BODY MASS INDEX: 38.51 KG/M2 | HEIGHT: 69 IN

## 2020-01-17 DIAGNOSIS — N18.30 STAGE 3 CHRONIC KIDNEY DISEASE: ICD-10-CM

## 2020-01-17 DIAGNOSIS — R80.1 PERSISTENT PROTEINURIA: ICD-10-CM

## 2020-01-17 PROCEDURE — 77012 CT SCAN FOR NEEDLE BIOPSY: CPT | Mod: TC

## 2020-01-17 PROCEDURE — 63600175 PHARM REV CODE 636 W HCPCS: Performed by: RADIOLOGY

## 2020-01-17 RX ORDER — FENTANYL CITRATE 50 UG/ML
INJECTION, SOLUTION INTRAMUSCULAR; INTRAVENOUS CODE/TRAUMA/SEDATION MEDICATION
Status: COMPLETED | OUTPATIENT
Start: 2020-01-17 | End: 2020-01-17

## 2020-01-17 RX ORDER — MIDAZOLAM HYDROCHLORIDE 1 MG/ML
INJECTION INTRAMUSCULAR; INTRAVENOUS CODE/TRAUMA/SEDATION MEDICATION
Status: COMPLETED | OUTPATIENT
Start: 2020-01-17 | End: 2020-01-17

## 2020-01-17 RX ADMIN — MIDAZOLAM HYDROCHLORIDE 1 MG: 1 INJECTION, SOLUTION INTRAMUSCULAR; INTRAVENOUS at 08:01

## 2020-01-17 RX ADMIN — FENTANYL CITRATE 50 MCG: 50 INJECTION, SOLUTION INTRAMUSCULAR; INTRAVENOUS at 08:01

## 2020-01-17 NOTE — PLAN OF CARE
Pt d/c home in stable condition via wheelchair with mother.  Verbalized understanding of d/c instructions, handout given.  Pt voiced no complaints at this time. Pt voided clear yellow urine.

## 2020-01-17 NOTE — SEDATION DOCUMENTATION
Patient placed on CT table prone with bilateral arms above head.  CM in place.  VSS.  Patient verbalizes understanding of procedure

## 2020-01-17 NOTE — DISCHARGE SUMMARY
Pre Op Diagnosis: CKD     Post Op Diagnosis: same     Procedure:  Renal core biopsy     Procedure performed by: Pau CALLOWAY, Yolanda MADRIGAL     Written Informed Consent Obtained: Yes     Specimen Removed:  yes     Estimated Blood Loss:  minimal     Findings: Local anesthesia and moderate sedation were used.     The patient tolerated the procedure well and there were no complications.      Sterile technique was performed in the flank, lidocaine was used as a local anesthetic.  Multiple samples taken from renal cortex.  Pt tolerated the procedure well without immediate complications.  Please see radiologist report for details. F/u with PCP and/or ordering physician.

## 2020-01-17 NOTE — DISCHARGE INSTRUCTIONS
Discharge Instructions for Kidney Biopsy  You had a procedure called a kidney biopsy. Your healthcare provider used a special needle to remove a small piece of tissue from your kidney to examine it for signs of damage and disease. A kidney biopsy is ordered after other tests have shown that there may be a problem with your kidney. Kidney biopsies are also performed when kidney disease is suspected and to rule out cancer.  Home care  · Rest for 24 hours to 48 hours. Get up only to use the bathroom.  · Dont drive for 24 hours to 48 hours after the procedure.  · Dont shower for 24 hours after the biopsy. If you wish, you may wash yourself with a sponge or washcloth. When you are able to shower, dont scrub the site. Gently wash the area and pat it dry.  · Remove the bandage covering the biopsy site 24 hours to 48 hours after the procedure.  · Dont lift anything heavier than 10 pounds for 3 days to 4 days after the procedure.  · Ask your healthcare provider when you can return to work. Be sure to tell your healthcare provider if your job involves heavy lifting.  · If you normally take blood thinner medicines (anticoagulants or antiplatelet medicines) and you stopped taking them a few days before your procedure, ask your healthcare provider when to start taking them again.  When to seek medical care  Call your healthcare provider right away if you have any of the following:  · Blood in your urine  · Exhaustion or extreme weakness  · Dizziness or lightheadedness  · Sudden or increased shortness of breath  · Sudden chest pain  · Fever of 100.4°F (38°C) or higher, or chills  · Increasing redness, tenderness, or swelling at the biopsy site  · Opening of or drainage or bleeding from the biopsy site  · Increasing pain, with or without activity   Date Last Reviewed: 2/1/2017  © 1769-8023 [employer]. [address] [place] [address], [place] [address]. All rights reserved. This information is not intended as a  substitute for professional medical care. Always follow your healthcare professional's instructions.        Recovery After Procedural Sedation (Adult)  You have been given medicine by vein to make you sleep during your surgery. This may have included both a pain medicine and sleeping medicine. Most of the effects have worn off. But you may still have some drowsiness for the next 6 to 8 hours.  Home care  Follow these guidelines when you get home:  · For the next 8 hours, you should be watched by a responsible adult. This person should make sure your condition is not getting worse.  · Don't drink any alcohol for the next 24 hours.  · Don't drive, operate dangerous machinery, or make important business or personal decisions during the next 24 hours.  Note: Your healthcare provider may tell you not to take any medicine by mouth for pain or sleep in the next 4 hours. These medicines may react with the medicines you were given in the hospital. This could cause a much stronger response than usual.  Follow-up care  Follow up with your healthcare provider if you are not alert and back to your usual level of activity within 12 hours.  When to seek medical advice  Call your healthcare provider right away if any of these occur:  · Drowsiness gets worse  · Weakness or dizziness gets worse  · Repeated vomiting  · You can't be awakened   Date Last Reviewed: 10/18/2016  © 0111-3448 [employer]. [address] [place] [address], [place] [address]. All rights reserved. This information is not intended as a substitute for professional medical care. Always follow your healthcare professional's instructions.

## 2020-01-22 ENCOUNTER — LAB VISIT (OUTPATIENT)
Dept: LAB | Facility: HOSPITAL | Age: 50
End: 2020-01-22
Attending: INTERNAL MEDICINE
Payer: COMMERCIAL

## 2020-01-22 DIAGNOSIS — R80.1 PERSISTENT PROTEINURIA: ICD-10-CM

## 2020-01-22 DIAGNOSIS — N18.30 STAGE 3 CHRONIC KIDNEY DISEASE: ICD-10-CM

## 2020-01-22 LAB
ALBUMIN SERPL BCP-MCNC: 3.5 G/DL (ref 3.5–5.2)
ANION GAP SERPL CALC-SCNC: 8 MMOL/L (ref 8–16)
BASOPHILS # BLD AUTO: 0.05 K/UL (ref 0–0.2)
BASOPHILS NFR BLD: 0.7 % (ref 0–1.9)
BILIRUB UR QL STRIP: NEGATIVE
BUN SERPL-MCNC: 18 MG/DL (ref 6–20)
CALCIUM SERPL-MCNC: 9.6 MG/DL (ref 8.7–10.5)
CHLORIDE SERPL-SCNC: 103 MMOL/L (ref 95–110)
CLARITY UR: CLEAR
CO2 SERPL-SCNC: 30 MMOL/L (ref 23–29)
COLOR UR: NORMAL
CREAT SERPL-MCNC: 1.4 MG/DL (ref 0.5–1.4)
CREAT UR-MCNC: 131 MG/DL (ref 23–375)
DIFFERENTIAL METHOD: ABNORMAL
EOSINOPHIL # BLD AUTO: 0.6 K/UL (ref 0–0.5)
EOSINOPHIL NFR BLD: 7.5 % (ref 0–8)
ERYTHROCYTE [DISTWIDTH] IN BLOOD BY AUTOMATED COUNT: 13 % (ref 11.5–14.5)
EST. GFR  (AFRICAN AMERICAN): >60 ML/MIN/1.73 M^2
EST. GFR  (NON AFRICAN AMERICAN): 58.2 ML/MIN/1.73 M^2
GLUCOSE SERPL-MCNC: 95 MG/DL (ref 70–110)
GLUCOSE UR QL STRIP: NEGATIVE
HCT VFR BLD AUTO: 49.4 % (ref 40–54)
HGB BLD-MCNC: 15.8 G/DL (ref 14–18)
HGB UR QL STRIP: NEGATIVE
IMM GRANULOCYTES # BLD AUTO: 0.01 K/UL (ref 0–0.04)
IMM GRANULOCYTES NFR BLD AUTO: 0.1 % (ref 0–0.5)
KETONES UR QL STRIP: NEGATIVE
LEUKOCYTE ESTERASE UR QL STRIP: NEGATIVE
LYMPHOCYTES # BLD AUTO: 1.6 K/UL (ref 1–4.8)
LYMPHOCYTES NFR BLD: 21.3 % (ref 18–48)
MCH RBC QN AUTO: 30.3 PG (ref 27–31)
MCHC RBC AUTO-ENTMCNC: 32 G/DL (ref 32–36)
MCV RBC AUTO: 95 FL (ref 82–98)
MONOCYTES # BLD AUTO: 0.4 K/UL (ref 0.3–1)
MONOCYTES NFR BLD: 5.8 % (ref 4–15)
NEUTROPHILS # BLD AUTO: 4.9 K/UL (ref 1.8–7.7)
NEUTROPHILS NFR BLD: 64.6 % (ref 38–73)
NITRITE UR QL STRIP: NEGATIVE
NRBC BLD-RTO: 0 /100 WBC
PH UR STRIP: 6 [PH] (ref 5–8)
PHOSPHATE SERPL-MCNC: 3.2 MG/DL (ref 2.7–4.5)
PLATELET # BLD AUTO: 269 K/UL (ref 150–350)
PMV BLD AUTO: 10.8 FL (ref 9.2–12.9)
POTASSIUM SERPL-SCNC: 4.1 MMOL/L (ref 3.5–5.1)
PROT UR QL STRIP: NEGATIVE
PROT UR-MCNC: <7 MG/DL (ref 0–15)
PROT/CREAT UR: NORMAL MG/G{CREAT} (ref 0–0.2)
RBC # BLD AUTO: 5.22 M/UL (ref 4.6–6.2)
SODIUM SERPL-SCNC: 141 MMOL/L (ref 136–145)
SP GR UR STRIP: 1.02 (ref 1–1.03)
URN SPEC COLLECT METH UR: NORMAL
WBC # BLD AUTO: 7.57 K/UL (ref 3.9–12.7)

## 2020-01-22 PROCEDURE — 36415 COLL VENOUS BLD VENIPUNCTURE: CPT

## 2020-01-22 PROCEDURE — 85025 COMPLETE CBC W/AUTO DIFF WBC: CPT

## 2020-01-22 PROCEDURE — 81003 URINALYSIS AUTO W/O SCOPE: CPT

## 2020-01-22 PROCEDURE — 82610 CYSTATIN C: CPT

## 2020-01-22 PROCEDURE — 80069 RENAL FUNCTION PANEL: CPT

## 2020-01-22 PROCEDURE — 84156 ASSAY OF PROTEIN URINE: CPT

## 2020-01-23 LAB
CYSTATIN C SERPL-MCNC: 1.11 MG/L (ref 0.72–1.32)
GFR/BSA.PRED SERPLBLD CYS-BASED-ARV: 70 ML/MIN/BSA

## 2020-01-24 ENCOUNTER — OFFICE VISIT (OUTPATIENT)
Dept: NEPHROLOGY | Facility: CLINIC | Age: 50
End: 2020-01-24
Payer: COMMERCIAL

## 2020-01-24 VITALS
HEART RATE: 86 BPM | BODY MASS INDEX: 37.82 KG/M2 | HEIGHT: 69 IN | WEIGHT: 255.31 LBS | DIASTOLIC BLOOD PRESSURE: 80 MMHG | SYSTOLIC BLOOD PRESSURE: 114 MMHG

## 2020-01-24 DIAGNOSIS — E87.6 HYPOKALEMIA: ICD-10-CM

## 2020-01-24 DIAGNOSIS — I10 ESSENTIAL HYPERTENSION: ICD-10-CM

## 2020-01-24 DIAGNOSIS — R80.1 PERSISTENT PROTEINURIA: ICD-10-CM

## 2020-01-24 DIAGNOSIS — N18.2 STAGE 2 CHRONIC KIDNEY DISEASE: Primary | ICD-10-CM

## 2020-01-24 PROCEDURE — 3008F BODY MASS INDEX DOCD: CPT | Mod: CPTII,S$GLB,, | Performed by: INTERNAL MEDICINE

## 2020-01-24 PROCEDURE — 3074F PR MOST RECENT SYSTOLIC BLOOD PRESSURE < 130 MM HG: ICD-10-PCS | Mod: CPTII,S$GLB,, | Performed by: INTERNAL MEDICINE

## 2020-01-24 PROCEDURE — 3079F PR MOST RECENT DIASTOLIC BLOOD PRESSURE 80-89 MM HG: ICD-10-PCS | Mod: CPTII,S$GLB,, | Performed by: INTERNAL MEDICINE

## 2020-01-24 PROCEDURE — 3074F SYST BP LT 130 MM HG: CPT | Mod: CPTII,S$GLB,, | Performed by: INTERNAL MEDICINE

## 2020-01-24 PROCEDURE — 3008F PR BODY MASS INDEX (BMI) DOCUMENTED: ICD-10-PCS | Mod: CPTII,S$GLB,, | Performed by: INTERNAL MEDICINE

## 2020-01-24 PROCEDURE — 3079F DIAST BP 80-89 MM HG: CPT | Mod: CPTII,S$GLB,, | Performed by: INTERNAL MEDICINE

## 2020-01-24 PROCEDURE — 99214 PR OFFICE/OUTPT VISIT, EST, LEVL IV, 30-39 MIN: ICD-10-PCS | Mod: S$GLB,,, | Performed by: INTERNAL MEDICINE

## 2020-01-24 PROCEDURE — 99214 OFFICE O/P EST MOD 30 MIN: CPT | Mod: S$GLB,,, | Performed by: INTERNAL MEDICINE

## 2020-01-24 PROCEDURE — 99999 PR PBB SHADOW E&M-EST. PATIENT-LVL III: ICD-10-PCS | Mod: PBBFAC,,, | Performed by: INTERNAL MEDICINE

## 2020-01-24 PROCEDURE — 99999 PR PBB SHADOW E&M-EST. PATIENT-LVL III: CPT | Mod: PBBFAC,,, | Performed by: INTERNAL MEDICINE

## 2020-02-05 ENCOUNTER — LAB VISIT (OUTPATIENT)
Dept: LAB | Facility: HOSPITAL | Age: 50
End: 2020-02-05
Attending: UROLOGY
Payer: COMMERCIAL

## 2020-02-05 DIAGNOSIS — E22.1 HYPERPROLACTINEMIA: ICD-10-CM

## 2020-02-05 DIAGNOSIS — E29.1 HYPOGONADISM IN MALE: ICD-10-CM

## 2020-02-05 LAB
ALBUMIN SERPL BCP-MCNC: 3.6 G/DL (ref 3.5–5.2)
ALP SERPL-CCNC: 69 U/L (ref 55–135)
ALT SERPL W/O P-5'-P-CCNC: 19 U/L (ref 10–44)
AST SERPL-CCNC: 18 U/L (ref 10–40)
BILIRUB DIRECT SERPL-MCNC: 0.1 MG/DL (ref 0.1–0.3)
BILIRUB SERPL-MCNC: 0.2 MG/DL (ref 0.1–1)
HCT VFR BLD AUTO: 46.1 % (ref 40–54)
PROLACTIN SERPL IA-MCNC: 1.8 NG/ML (ref 3.5–19.4)
PROT SERPL-MCNC: 6.9 G/DL (ref 6–8.4)

## 2020-02-05 PROCEDURE — 85014 HEMATOCRIT: CPT

## 2020-02-05 PROCEDURE — 84146 ASSAY OF PROLACTIN: CPT

## 2020-02-05 PROCEDURE — 36415 COLL VENOUS BLD VENIPUNCTURE: CPT

## 2020-02-05 PROCEDURE — 80076 HEPATIC FUNCTION PANEL: CPT

## 2020-02-10 ENCOUNTER — OFFICE VISIT (OUTPATIENT)
Dept: UROLOGY | Facility: CLINIC | Age: 50
End: 2020-02-10
Payer: COMMERCIAL

## 2020-02-10 VITALS
WEIGHT: 256.31 LBS | DIASTOLIC BLOOD PRESSURE: 90 MMHG | SYSTOLIC BLOOD PRESSURE: 130 MMHG | BODY MASS INDEX: 37.85 KG/M2

## 2020-02-10 DIAGNOSIS — I10 HYPERTENSION, UNSPECIFIED TYPE: ICD-10-CM

## 2020-02-10 DIAGNOSIS — D35.2 PROLACTINOMA: Primary | ICD-10-CM

## 2020-02-10 DIAGNOSIS — N52.9 ERECTILE DYSFUNCTION, UNSPECIFIED ERECTILE DYSFUNCTION TYPE: ICD-10-CM

## 2020-02-10 DIAGNOSIS — D35.3 BENIGN NEOPLASM OF PITUITARY GLAND AND CRANIOPHARYNGEAL DUCT (POUCH): ICD-10-CM

## 2020-02-10 DIAGNOSIS — Z12.5 PROSTATE CANCER SCREENING: ICD-10-CM

## 2020-02-10 DIAGNOSIS — E29.1 HYPOGONADISM IN MALE: ICD-10-CM

## 2020-02-10 DIAGNOSIS — D35.2 BENIGN NEOPLASM OF PITUITARY GLAND AND CRANIOPHARYNGEAL DUCT (POUCH): ICD-10-CM

## 2020-02-10 PROCEDURE — 81002 URINALYSIS NONAUTO W/O SCOPE: CPT | Mod: S$GLB,,, | Performed by: UROLOGY

## 2020-02-10 PROCEDURE — 3008F PR BODY MASS INDEX (BMI) DOCUMENTED: ICD-10-PCS | Mod: CPTII,S$GLB,, | Performed by: UROLOGY

## 2020-02-10 PROCEDURE — 81002 POCT URINE DIPSTICK WITHOUT MICROSCOPE: ICD-10-PCS | Mod: S$GLB,,, | Performed by: UROLOGY

## 2020-02-10 PROCEDURE — 3075F SYST BP GE 130 - 139MM HG: CPT | Mod: CPTII,S$GLB,, | Performed by: UROLOGY

## 2020-02-10 PROCEDURE — 99214 OFFICE O/P EST MOD 30 MIN: CPT | Mod: 25,S$GLB,, | Performed by: UROLOGY

## 2020-02-10 PROCEDURE — 3075F PR MOST RECENT SYSTOLIC BLOOD PRESS GE 130-139MM HG: ICD-10-PCS | Mod: CPTII,S$GLB,, | Performed by: UROLOGY

## 2020-02-10 PROCEDURE — 3008F BODY MASS INDEX DOCD: CPT | Mod: CPTII,S$GLB,, | Performed by: UROLOGY

## 2020-02-10 PROCEDURE — 99999 PR PBB SHADOW E&M-EST. PATIENT-LVL III: CPT | Mod: PBBFAC,,, | Performed by: UROLOGY

## 2020-02-10 PROCEDURE — 3080F DIAST BP >= 90 MM HG: CPT | Mod: CPTII,S$GLB,, | Performed by: UROLOGY

## 2020-02-10 PROCEDURE — 99999 PR PBB SHADOW E&M-EST. PATIENT-LVL III: ICD-10-PCS | Mod: PBBFAC,,, | Performed by: UROLOGY

## 2020-02-10 PROCEDURE — 3080F PR MOST RECENT DIASTOLIC BLOOD PRESSURE >= 90 MM HG: ICD-10-PCS | Mod: CPTII,S$GLB,, | Performed by: UROLOGY

## 2020-02-10 PROCEDURE — 99214 PR OFFICE/OUTPT VISIT, EST, LEVL IV, 30-39 MIN: ICD-10-PCS | Mod: 25,S$GLB,, | Performed by: UROLOGY

## 2020-02-10 RX ORDER — TESTOSTERONE CYPIONATE 200 MG/ML
INJECTION, SOLUTION INTRAMUSCULAR
Qty: 10 ML | Refills: 1 | Status: SHIPPED | OUTPATIENT
Start: 2020-02-10 | End: 2020-09-01 | Stop reason: SDUPTHER

## 2020-02-10 RX ORDER — CABERGOLINE 0.5 MG/1
TABLET ORAL
Qty: 48 TABLET | Refills: 11 | Status: SHIPPED | OUTPATIENT
Start: 2020-02-10 | End: 2021-02-19 | Stop reason: SDUPTHER

## 2020-02-10 RX ORDER — SILDENAFIL CITRATE 20 MG/1
TABLET ORAL
Qty: 50 TABLET | Refills: 11 | Status: SHIPPED | OUTPATIENT
Start: 2020-02-10 | End: 2021-03-29 | Stop reason: SDUPTHER

## 2020-02-10 NOTE — PROGRESS NOTES
"Chief Complaint: Nocturia    HPI:   2/10/20: Been good no except some malignant HTN recently.  Constipation still a problem is to see GI.  8/5/19: No problems feeling fine.  Labs approp, cabergoline going well.  Miralax helps.  Still having constipation once a week.  Not needing flomax.  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  Labs not done.  Miralax working.  Taking cabergoline.  Reviewed history in detail.  6/12/18: Prolactin approp, T taken monthly.  Constipation managed with better activity.  Voiding okay on flomax.  8/21/17: Labs not back yet.  No new problems.  Does have some LUTS when he gets constipated; reviewed in detail rx for miralax.  2/16/17: Prolactin normal on cabergoline, T is low till.  Voiding fine no new problems.    8/3/16: No abd/pelvic pain and no exac/rel factors.  No hematuria.  No urolithiasis.  No urinary bother.  No  history.  Normal sexual function.  Recent prolactin very high; free T mildly low.  Referred by Dr. Jimenez. Had stopped cabergoline sometime 11/15 but has recently restarted it.  Was off flomax and having nocturia x8 after 3-4 in the morning; trouble getting all the way empty lots of double voiding. Has restarted flomax and now the nocturia has improved.  Has not been on T in a year and a half and hasn't missed it.  Drinks cokes some days.  Constipation is a problem.  10/12: Shraddha: "Grant Rodriguez is a 42 y.o. male with a history of prolactinoma.  He is currently on cabergoline for this.  He is also on TRT via IM injections.  He is here to discuss possible testopel.  He also has some LUTS.  He has nocturia x 3, decreased FOS, + straining.  He denies urgency.  He would like to try an alpha blocker. He denies ED.  -> was given flomax; did not return for testopel.    Allergies:  Patient has no known allergies.    Medications:  has a current medication list which includes the following prescription(s): amlodipine, cabergoline, hydralazine, hydrochlorothiazide, potassium chloride sa, " sildenafil, and testosterone cypionate.    Review of Systems:  General: No fever, chills, fatigability, or weight loss.  Skin: No rashes, itching, or changes in color or texture of skin.  Chest: Denies LR, cyanosis, wheezing, cough, and sputum production.  Abdomen: Appetite fine. No weight loss. Denies diarrhea, abdominal pain, hematemesis, or blood in stool.  Musculoskeletal: No joint stiffness or swelling. Denies back pain.  : As above.  All other review of systems negative.    PMH:   has a past medical history of Hypertensive urgency (12/30/2019), Hypogonadism, male, IGT (impaired glucose tolerance), Obesity, and Prolactinoma.    PSH:   has no past surgical history on file.    FamHx: family history includes Diabetes in his father; Hypertension in his father.    SocHx:  reports that he has been smoking. He has a 20.00 pack-year smoking history. He does not have any smokeless tobacco history on file. He reports that he does not drink alcohol or use drugs.      Physical Exam:  Vitals:    02/10/20 1606   BP: (!) 130/90     General: A&Ox3, no apparent distress, no deformities  Neck: No masses, normal thyroid  Lungs: normal inspiration, no use of accessory muscles  Heart: normal pulse, no arrhythmias  Abdomen: Soft, NT, ND  Skin: The skin is warm and dry. No jaundice.  Ext: No c/c/e.  :   6/12/18: Test desc alethea, no abnormalities of epididymus. Penis normal, with normal penile and scrotal skin. Meatus normal. Normal rectal tone, no hemorrhoids. Prost 30 gm no nodules or masses appreciated. SV not palpable. Perineum and anus normal.    Labs/Studies:   Urinalysis performed in clinic, summary: UA normal  Bladder Scan performed in office:     8/3/16: PVR 15 ml.  PSA    7/16: 0.5  Prolactin     1/20: 1.8    Impression/Plan:   Same plan:   1. PSA was low.  Hct/LFT/Prolactin/PSA in 6 mo.  2. Cabergoline should not be stopped.  Elevated prolactin shifts FSH/LH and T will improve on this regimen.    3. T 200mg q3wks, RTC 6  mo with labs.  4. HTN: stable and low today on new meds  5. Constipation: miralax helps but going to GI soon  6. ED: sildenafil 20's

## 2020-02-17 ENCOUNTER — TELEPHONE (OUTPATIENT)
Dept: URGENT CARE | Facility: CLINIC | Age: 50
End: 2020-02-17

## 2020-02-24 LAB — FINAL PATHOLOGIC DIAGNOSIS: NORMAL

## 2020-03-30 ENCOUNTER — PATIENT MESSAGE (OUTPATIENT)
Dept: GASTROENTEROLOGY | Facility: CLINIC | Age: 50
End: 2020-03-30

## 2020-04-13 RX ORDER — POTASSIUM CHLORIDE 750 MG/1
20 TABLET, EXTENDED RELEASE ORAL DAILY
Qty: 90 TABLET | Refills: 1 | Status: SHIPPED | OUTPATIENT
Start: 2020-04-13 | End: 2020-07-24 | Stop reason: SDUPTHER

## 2020-04-16 ENCOUNTER — PATIENT MESSAGE (OUTPATIENT)
Dept: INTERNAL MEDICINE | Facility: CLINIC | Age: 50
End: 2020-04-16

## 2020-04-16 RX ORDER — SILVER SULFADIAZINE 10 G/1000G
CREAM TOPICAL 2 TIMES DAILY
Qty: 85 G | Refills: 1 | Status: SHIPPED | OUTPATIENT
Start: 2020-04-16 | End: 2022-01-24

## 2020-05-12 ENCOUNTER — LAB VISIT (OUTPATIENT)
Dept: LAB | Facility: HOSPITAL | Age: 50
End: 2020-05-12
Attending: INTERNAL MEDICINE
Payer: MEDICAID

## 2020-05-12 DIAGNOSIS — I10 ESSENTIAL HYPERTENSION: ICD-10-CM

## 2020-05-12 DIAGNOSIS — E87.6 HYPOKALEMIA: ICD-10-CM

## 2020-05-12 DIAGNOSIS — N18.2 STAGE 2 CHRONIC KIDNEY DISEASE: ICD-10-CM

## 2020-05-12 DIAGNOSIS — R80.1 PERSISTENT PROTEINURIA: ICD-10-CM

## 2020-05-12 LAB
ALBUMIN SERPL BCP-MCNC: 3.6 G/DL (ref 3.5–5.2)
ANION GAP SERPL CALC-SCNC: 8 MMOL/L (ref 8–16)
BASOPHILS # BLD AUTO: 0.05 K/UL (ref 0–0.2)
BASOPHILS NFR BLD: 0.8 % (ref 0–1.9)
BUN SERPL-MCNC: 21 MG/DL (ref 6–20)
CALCIUM SERPL-MCNC: 9.1 MG/DL (ref 8.7–10.5)
CHLORIDE SERPL-SCNC: 102 MMOL/L (ref 95–110)
CO2 SERPL-SCNC: 26 MMOL/L (ref 23–29)
CREAT SERPL-MCNC: 1.8 MG/DL (ref 0.5–1.4)
DIFFERENTIAL METHOD: NORMAL
EOSINOPHIL # BLD AUTO: 0.3 K/UL (ref 0–0.5)
EOSINOPHIL NFR BLD: 4.2 % (ref 0–8)
ERYTHROCYTE [DISTWIDTH] IN BLOOD BY AUTOMATED COUNT: 12.9 % (ref 11.5–14.5)
EST. GFR  (AFRICAN AMERICAN): 49 ML/MIN/1.73 M^2
EST. GFR  (NON AFRICAN AMERICAN): 43 ML/MIN/1.73 M^2
GLUCOSE SERPL-MCNC: 107 MG/DL (ref 70–110)
HCT VFR BLD AUTO: 45 % (ref 40–54)
HGB BLD-MCNC: 14.9 G/DL (ref 14–18)
IMM GRANULOCYTES # BLD AUTO: 0.01 K/UL (ref 0–0.04)
IMM GRANULOCYTES NFR BLD AUTO: 0.2 % (ref 0–0.5)
LYMPHOCYTES # BLD AUTO: 1.9 K/UL (ref 1–4.8)
LYMPHOCYTES NFR BLD: 29.8 % (ref 18–48)
MCH RBC QN AUTO: 30.5 PG (ref 27–31)
MCHC RBC AUTO-ENTMCNC: 33.1 G/DL (ref 32–36)
MCV RBC AUTO: 92 FL (ref 82–98)
MONOCYTES # BLD AUTO: 0.4 K/UL (ref 0.3–1)
MONOCYTES NFR BLD: 6.6 % (ref 4–15)
NEUTROPHILS # BLD AUTO: 3.7 K/UL (ref 1.8–7.7)
NEUTROPHILS NFR BLD: 58.4 % (ref 38–73)
NRBC BLD-RTO: 0 /100 WBC
PHOSPHATE SERPL-MCNC: 2.8 MG/DL (ref 2.7–4.5)
PLATELET # BLD AUTO: 263 K/UL (ref 150–350)
PMV BLD AUTO: 10.6 FL (ref 9.2–12.9)
POTASSIUM SERPL-SCNC: 4.1 MMOL/L (ref 3.5–5.1)
PTH-INTACT SERPL-MCNC: 71 PG/ML (ref 9–77)
RBC # BLD AUTO: 4.88 M/UL (ref 4.6–6.2)
SODIUM SERPL-SCNC: 136 MMOL/L (ref 136–145)
WBC # BLD AUTO: 6.24 K/UL (ref 3.9–12.7)

## 2020-05-12 PROCEDURE — 36415 COLL VENOUS BLD VENIPUNCTURE: CPT

## 2020-05-12 PROCEDURE — 82610 CYSTATIN C: CPT

## 2020-05-12 PROCEDURE — 85025 COMPLETE CBC W/AUTO DIFF WBC: CPT

## 2020-05-12 PROCEDURE — 80069 RENAL FUNCTION PANEL: CPT

## 2020-05-12 PROCEDURE — 83970 ASSAY OF PARATHORMONE: CPT

## 2020-05-13 ENCOUNTER — PATIENT OUTREACH (OUTPATIENT)
Dept: ADMINISTRATIVE | Facility: OTHER | Age: 50
End: 2020-05-13

## 2020-05-13 DIAGNOSIS — Z12.11 ENCOUNTER FOR FECAL IMMUNOCHEMICAL TEST SCREENING: Primary | ICD-10-CM

## 2020-05-14 ENCOUNTER — OFFICE VISIT (OUTPATIENT)
Dept: NEPHROLOGY | Facility: CLINIC | Age: 50
End: 2020-05-14
Payer: MEDICAID

## 2020-05-14 VITALS
SYSTOLIC BLOOD PRESSURE: 114 MMHG | BODY MASS INDEX: 37.42 KG/M2 | HEART RATE: 70 BPM | WEIGHT: 252.63 LBS | DIASTOLIC BLOOD PRESSURE: 62 MMHG | HEIGHT: 69 IN

## 2020-05-14 DIAGNOSIS — I10 ESSENTIAL HYPERTENSION: ICD-10-CM

## 2020-05-14 DIAGNOSIS — N18.2 STAGE 2 CHRONIC KIDNEY DISEASE: Primary | ICD-10-CM

## 2020-05-14 DIAGNOSIS — E87.6 HYPOKALEMIA: ICD-10-CM

## 2020-05-14 DIAGNOSIS — R80.1 PERSISTENT PROTEINURIA: ICD-10-CM

## 2020-05-14 LAB
CYSTATIN C SERPL-MCNC: 1.12 MG/L (ref 0.72–1.32)
GFR/BSA.PRED SERPLBLD CYS-BASED-ARV: 69 ML/MIN/BSA

## 2020-05-14 PROCEDURE — 99999 PR PBB SHADOW E&M-EST. PATIENT-LVL III: ICD-10-PCS | Mod: PBBFAC,,, | Performed by: INTERNAL MEDICINE

## 2020-05-14 PROCEDURE — 99214 OFFICE O/P EST MOD 30 MIN: CPT | Mod: S$PBB,,, | Performed by: INTERNAL MEDICINE

## 2020-05-14 PROCEDURE — 99213 OFFICE O/P EST LOW 20 MIN: CPT | Mod: PBBFAC | Performed by: INTERNAL MEDICINE

## 2020-05-14 PROCEDURE — 99999 PR PBB SHADOW E&M-EST. PATIENT-LVL III: CPT | Mod: PBBFAC,,, | Performed by: INTERNAL MEDICINE

## 2020-05-14 PROCEDURE — 99214 PR OFFICE/OUTPT VISIT, EST, LEVL IV, 30-39 MIN: ICD-10-PCS | Mod: S$PBB,,, | Performed by: INTERNAL MEDICINE

## 2020-05-14 NOTE — PROGRESS NOTES
Subjective:       Patient ID: Grant Rodriguez is a 50 y.o. Black or  male who presents for  Follow-up evaluation of Chronic Kidney Disease    HPI     Patient is a 50-year-old male with longstanding history of prolactinoma.  Also followed by Dr. Mejia in the Urology division for Low testosterone level.  Has history of Renal cyst-simple.  History of  Hypertension Dx in 12/2019 with high BP >200.  Creatinine ranging between 1.2 and 1.4 many years ago.     For January 2020 comes for consultation with rise in creatinine now 1.7 2+ Upro.  CT scan done in December 2019 shows simple cyst in the left kidney. Lost 20 ib in 2 years ; Hx of NS as a child s/p steroids proceed with a kidney biopsy     kidney biopsy shows hypertensive nephropathy only no autoimmune disorder and no evidence of minimal change disease.  Some basement membrane thickening probably from obesity. GFR 70% no heavy proteinuria    Review of Systems   Constitutional: Negative.  Negative for activity change, appetite change, chills, diaphoresis, fatigue and fever.   HENT: Negative.  Negative for congestion and trouble swallowing.    Eyes: Negative.    Respiratory: Negative.  Negative for cough, chest tightness, shortness of breath and wheezing.    Cardiovascular: Negative.  Negative for chest pain, palpitations and leg swelling.   Gastrointestinal: Negative.  Negative for abdominal distention, abdominal pain, nausea and vomiting.   Genitourinary: Negative.  Negative for decreased urine volume, difficulty urinating, dysuria, enuresis, flank pain, frequency, hematuria, penile swelling, scrotal swelling and urgency.   Musculoskeletal: Negative.  Negative for arthralgias, back pain, joint swelling and neck pain.   Skin: Negative for rash.   Neurological: Negative.  Negative for tremors, seizures and headaches.   Psychiatric/Behavioral: Negative.  Negative for confusion and sleep disturbance. The patient is not nervous/anxious.    All other  "systems reviewed and are negative.      Objective:   /62   Pulse 70   Ht 5' 9" (1.753 m)   Wt 114.6 kg (252 lb 10.4 oz)   BMI 37.31 kg/m²      Physical Exam   Constitutional: He is oriented to person, place, and time. He appears well-developed and well-nourished. No distress.   HENT:   Head: Normocephalic.   Eyes: Pupils are equal, round, and reactive to light. EOM are normal.   Neck: Normal range of motion. Neck supple. No JVD present. No thyromegaly present.   Cardiovascular: Normal rate, regular rhythm, S1 normal, S2 normal, normal heart sounds and intact distal pulses. PMI is not displaced. Exam reveals no gallop and no friction rub.   No murmur heard.  Pulmonary/Chest: Effort normal and breath sounds normal. No respiratory distress. He has no wheezes. He has no rales. He exhibits no tenderness.   Abdominal: He exhibits no distension and no mass. There is no hepatosplenomegaly. There is no tenderness. There is no rebound and no CVA tenderness. No hernia.   Musculoskeletal: Normal range of motion. He exhibits no edema or tenderness.   Lymphadenopathy:     He has no cervical adenopathy.   Neurological: He is alert and oriented to person, place, and time. He has normal reflexes. He is not disoriented. He displays normal reflexes. No cranial nerve deficit. He exhibits normal muscle tone. Coordination normal.   Skin: Skin is warm and dry. No rash noted. No erythema.   Psychiatric: He has a normal mood and affect. His behavior is normal. Judgment and thought content normal.   Nursing note and vitals reviewed.        Lab Results   Component Value Date    CREATININE 1.8 (H) 05/12/2020    BUN 21 (H) 05/12/2020     05/12/2020    K 4.1 05/12/2020     05/12/2020    CO2 26 05/12/2020     Lab Results   Component Value Date    WBC 6.24 05/12/2020    HGB 14.9 05/12/2020    HCT 45.0 05/12/2020    MCV 92 05/12/2020     05/12/2020     Lab Results   Component Value Date    PTH 71.0 05/12/2020    CALCIUM " 9.1 05/12/2020    PHOS 2.8 05/12/2020         Assessment:    )    1. Stage 2 chronic kidney disease    2. Hypokalemia    3. Essential hypertension    4. Persistent proteinuria        Plan:          Hypertensive nephropathy with basement membrane thickening probably from morbid obesity.  Need active lifestyle changes along with exercise diet control and aggressive blood pressure control.     +mireya feedback

## 2020-07-17 ENCOUNTER — PATIENT OUTREACH (OUTPATIENT)
Dept: ADMINISTRATIVE | Facility: OTHER | Age: 50
End: 2020-07-17

## 2020-07-24 RX ORDER — POTASSIUM CHLORIDE 750 MG/1
20 TABLET, EXTENDED RELEASE ORAL DAILY
Qty: 90 TABLET | Refills: 1 | Status: SHIPPED | OUTPATIENT
Start: 2020-07-24 | End: 2020-10-30 | Stop reason: SDUPTHER

## 2020-07-31 ENCOUNTER — PATIENT OUTREACH (OUTPATIENT)
Dept: ADMINISTRATIVE | Facility: HOSPITAL | Age: 50
End: 2020-07-31

## 2020-08-05 ENCOUNTER — LAB VISIT (OUTPATIENT)
Dept: LAB | Facility: HOSPITAL | Age: 50
End: 2020-08-05
Attending: UROLOGY
Payer: MEDICAID

## 2020-08-05 DIAGNOSIS — D35.2 BENIGN NEOPLASM OF PITUITARY GLAND AND CRANIOPHARYNGEAL DUCT (POUCH): ICD-10-CM

## 2020-08-05 DIAGNOSIS — D35.3 BENIGN NEOPLASM OF PITUITARY GLAND AND CRANIOPHARYNGEAL DUCT (POUCH): ICD-10-CM

## 2020-08-05 DIAGNOSIS — N52.9 ERECTILE DYSFUNCTION, UNSPECIFIED ERECTILE DYSFUNCTION TYPE: ICD-10-CM

## 2020-08-05 DIAGNOSIS — D35.2 PROLACTINOMA: ICD-10-CM

## 2020-08-05 DIAGNOSIS — E29.1 HYPOGONADISM IN MALE: ICD-10-CM

## 2020-08-05 DIAGNOSIS — Z12.5 PROSTATE CANCER SCREENING: ICD-10-CM

## 2020-08-05 DIAGNOSIS — I10 HYPERTENSION, UNSPECIFIED TYPE: ICD-10-CM

## 2020-08-05 LAB
ALBUMIN SERPL BCP-MCNC: 3.6 G/DL (ref 3.5–5.2)
ALP SERPL-CCNC: 69 U/L (ref 55–135)
ALT SERPL W/O P-5'-P-CCNC: 21 U/L (ref 10–44)
AST SERPL-CCNC: 17 U/L (ref 10–40)
BILIRUB DIRECT SERPL-MCNC: 0.3 MG/DL (ref 0.1–0.3)
BILIRUB SERPL-MCNC: 0.5 MG/DL (ref 0.1–1)
COMPLEXED PSA SERPL-MCNC: 0.95 NG/ML (ref 0–4)
HCT VFR BLD AUTO: 46.9 % (ref 40–54)
PROLACTIN SERPL IA-MCNC: 1.1 NG/ML (ref 3.5–19.4)
PROT SERPL-MCNC: 7.2 G/DL (ref 6–8.4)

## 2020-08-05 PROCEDURE — 84146 ASSAY OF PROLACTIN: CPT

## 2020-08-05 PROCEDURE — 80076 HEPATIC FUNCTION PANEL: CPT

## 2020-08-05 PROCEDURE — 84153 ASSAY OF PSA TOTAL: CPT

## 2020-08-05 PROCEDURE — 85014 HEMATOCRIT: CPT

## 2020-08-05 PROCEDURE — 36415 COLL VENOUS BLD VENIPUNCTURE: CPT

## 2020-08-08 ENCOUNTER — PATIENT OUTREACH (OUTPATIENT)
Dept: ADMINISTRATIVE | Facility: OTHER | Age: 50
End: 2020-08-08

## 2020-08-09 NOTE — PROGRESS NOTES
Chart reviewed.   Immunizations: Triggered Imm Registry     Orders placed: n/a  Upcoming appts to satisfy LISA topics: n/a

## 2020-09-01 DIAGNOSIS — D35.3 BENIGN NEOPLASM OF PITUITARY GLAND AND CRANIOPHARYNGEAL DUCT (POUCH): ICD-10-CM

## 2020-09-01 DIAGNOSIS — I10 HYPERTENSION, UNSPECIFIED TYPE: ICD-10-CM

## 2020-09-01 DIAGNOSIS — D35.2 BENIGN NEOPLASM OF PITUITARY GLAND AND CRANIOPHARYNGEAL DUCT (POUCH): ICD-10-CM

## 2020-09-01 DIAGNOSIS — N52.9 ERECTILE DYSFUNCTION, UNSPECIFIED ERECTILE DYSFUNCTION TYPE: ICD-10-CM

## 2020-09-01 DIAGNOSIS — Z12.5 PROSTATE CANCER SCREENING: ICD-10-CM

## 2020-09-01 DIAGNOSIS — D35.2 PROLACTINOMA: ICD-10-CM

## 2020-09-01 DIAGNOSIS — E29.1 HYPOGONADISM IN MALE: ICD-10-CM

## 2020-09-01 RX ORDER — TESTOSTERONE CYPIONATE 200 MG/ML
INJECTION, SOLUTION INTRAMUSCULAR
Qty: 10 ML | Refills: 1 | Status: SHIPPED | OUTPATIENT
Start: 2020-09-01 | End: 2021-03-29

## 2020-09-21 ENCOUNTER — PATIENT MESSAGE (OUTPATIENT)
Dept: PULMONOLOGY | Facility: CLINIC | Age: 50
End: 2020-09-21

## 2020-10-26 ENCOUNTER — LAB VISIT (OUTPATIENT)
Dept: LAB | Facility: HOSPITAL | Age: 50
End: 2020-10-26
Attending: INTERNAL MEDICINE
Payer: MEDICAID

## 2020-10-26 DIAGNOSIS — I10 ESSENTIAL HYPERTENSION: ICD-10-CM

## 2020-10-26 DIAGNOSIS — R80.1 PERSISTENT PROTEINURIA: ICD-10-CM

## 2020-10-26 DIAGNOSIS — E87.6 HYPOKALEMIA: ICD-10-CM

## 2020-10-26 DIAGNOSIS — N18.2 STAGE 2 CHRONIC KIDNEY DISEASE: ICD-10-CM

## 2020-10-26 LAB
ALBUMIN SERPL BCP-MCNC: 3.2 G/DL (ref 3.5–5.2)
ANION GAP SERPL CALC-SCNC: 8 MMOL/L (ref 8–16)
BUN SERPL-MCNC: 13 MG/DL (ref 6–20)
CALCIUM SERPL-MCNC: 8.8 MG/DL (ref 8.7–10.5)
CHLORIDE SERPL-SCNC: 104 MMOL/L (ref 95–110)
CO2 SERPL-SCNC: 28 MMOL/L (ref 23–29)
CREAT SERPL-MCNC: 1.6 MG/DL (ref 0.5–1.4)
EST. GFR  (AFRICAN AMERICAN): 57 ML/MIN/1.73 M^2
EST. GFR  (NON AFRICAN AMERICAN): 49 ML/MIN/1.73 M^2
GLUCOSE SERPL-MCNC: 98 MG/DL (ref 70–110)
PHOSPHATE SERPL-MCNC: 3.1 MG/DL (ref 2.7–4.5)
POTASSIUM SERPL-SCNC: 3.9 MMOL/L (ref 3.5–5.1)
SODIUM SERPL-SCNC: 140 MMOL/L (ref 136–145)

## 2020-10-26 PROCEDURE — 82610 CYSTATIN C: CPT

## 2020-10-26 PROCEDURE — 80069 RENAL FUNCTION PANEL: CPT

## 2020-10-26 PROCEDURE — 36415 COLL VENOUS BLD VENIPUNCTURE: CPT

## 2020-10-30 ENCOUNTER — PATIENT OUTREACH (OUTPATIENT)
Dept: ADMINISTRATIVE | Facility: OTHER | Age: 50
End: 2020-10-30

## 2020-10-30 ENCOUNTER — OFFICE VISIT (OUTPATIENT)
Dept: NEPHROLOGY | Facility: CLINIC | Age: 50
End: 2020-10-30
Payer: MEDICAID

## 2020-10-30 VITALS
HEART RATE: 70 BPM | HEIGHT: 69 IN | WEIGHT: 265.88 LBS | BODY MASS INDEX: 39.38 KG/M2 | DIASTOLIC BLOOD PRESSURE: 60 MMHG | SYSTOLIC BLOOD PRESSURE: 130 MMHG

## 2020-10-30 DIAGNOSIS — M25.512 ACUTE PAIN OF LEFT SHOULDER: ICD-10-CM

## 2020-10-30 DIAGNOSIS — E66.9 CLASS 2 OBESITY WITH BODY MASS INDEX (BMI) OF 36.0 TO 36.9 IN ADULT, UNSPECIFIED OBESITY TYPE, UNSPECIFIED WHETHER SERIOUS COMORBIDITY PRESENT: ICD-10-CM

## 2020-10-30 DIAGNOSIS — N18.2 STAGE 2 CHRONIC KIDNEY DISEASE: Primary | ICD-10-CM

## 2020-10-30 DIAGNOSIS — E87.6 HYPOKALEMIA: ICD-10-CM

## 2020-10-30 DIAGNOSIS — I10 ESSENTIAL HYPERTENSION: ICD-10-CM

## 2020-10-30 PROCEDURE — 99999 PR PBB SHADOW E&M-EST. PATIENT-LVL IV: ICD-10-PCS | Mod: PBBFAC,,, | Performed by: INTERNAL MEDICINE

## 2020-10-30 PROCEDURE — 99999 PR PBB SHADOW E&M-EST. PATIENT-LVL IV: CPT | Mod: PBBFAC,,, | Performed by: INTERNAL MEDICINE

## 2020-10-30 PROCEDURE — 99214 PR OFFICE/OUTPT VISIT, EST, LEVL IV, 30-39 MIN: ICD-10-PCS | Mod: S$PBB,,, | Performed by: INTERNAL MEDICINE

## 2020-10-30 PROCEDURE — 99214 OFFICE O/P EST MOD 30 MIN: CPT | Mod: S$PBB,,, | Performed by: INTERNAL MEDICINE

## 2020-10-30 PROCEDURE — 99214 OFFICE O/P EST MOD 30 MIN: CPT | Mod: PBBFAC | Performed by: INTERNAL MEDICINE

## 2020-10-30 RX ORDER — POTASSIUM CHLORIDE 750 MG/1
20 TABLET, EXTENDED RELEASE ORAL DAILY
Qty: 90 TABLET | Refills: 1 | Status: SHIPPED | OUTPATIENT
Start: 2020-10-30 | End: 2021-02-23 | Stop reason: SDUPTHER

## 2020-10-30 RX ORDER — METHYLPREDNISOLONE 4 MG/1
TABLET ORAL
Qty: 1 PACKAGE | Refills: 0 | Status: SHIPPED | OUTPATIENT
Start: 2020-10-30 | End: 2020-11-20

## 2020-10-30 NOTE — PROGRESS NOTES
Subjective:       Patient ID: Grant Rodriguez is a 50 y.o. Black or  male who presents for  Follow-up evaluation of Chronic Kidney Disease    HPI     Patient is a 50-year-old male with longstanding history of prolactinoma.  Also followed by Dr. Mejia in the Urology division for Low testosterone level.  Has history of Renal cyst-simple.  History of  Hypertension Dx in 12/2019 with high BP >200.  Creatinine ranging between 1.2 and 1.4 many years ago.     For January 2020 comes for consultation with rise in creatinine now 1.7 2+ Upro.  CT scan done in December 2019 shows simple cyst in the left kidney. Lost 20 ib in 2 years ; Hx of NS as a child s/p steroids proceed with a kidney biopsy     kidney biopsy shows hypertensive nephropathy only no autoimmune disorder and no evidence of minimal change disease.  Some basement membrane thickening probably from obesity. GFR 70% no heavy proteinuria    October 2020 creatinine stable at 1.6 no heavy proteinuria.  GFR 70% and stable excellent prognosis.  Weight is 265 weight gain of 13 lb    Review of Systems   Constitutional: Negative.  Negative for activity change, appetite change, chills, diaphoresis, fatigue and fever.   HENT: Negative.  Negative for congestion and trouble swallowing.    Eyes: Negative.    Respiratory: Negative.  Negative for cough, chest tightness, shortness of breath and wheezing.    Cardiovascular: Negative.  Negative for chest pain, palpitations and leg swelling.   Gastrointestinal: Negative.  Negative for abdominal distention, abdominal pain, nausea and vomiting.   Genitourinary: Negative.  Negative for decreased urine volume, difficulty urinating, dysuria, enuresis, flank pain, frequency, hematuria, penile swelling, scrotal swelling and urgency.   Musculoskeletal: Negative.  Negative for arthralgias, back pain, joint swelling and neck pain.   Skin: Negative for rash.   Neurological: Negative.  Negative for tremors, seizures and  "headaches.   Psychiatric/Behavioral: Negative.  Negative for confusion and sleep disturbance. The patient is not nervous/anxious.    All other systems reviewed and are negative.      Objective:   /60   Pulse 70   Ht 5' 9" (1.753 m)   Wt 120.6 kg (265 lb 14 oz)   BMI 39.26 kg/m²      Physical Exam  Vitals signs and nursing note reviewed.   Constitutional:       General: He is not in acute distress.     Appearance: He is well-developed.   HENT:      Head: Normocephalic.   Eyes:      Pupils: Pupils are equal, round, and reactive to light.   Neck:      Musculoskeletal: Normal range of motion and neck supple.      Thyroid: No thyromegaly.      Vascular: No JVD.   Cardiovascular:      Rate and Rhythm: Normal rate and regular rhythm.      Chest Wall: PMI is not displaced.      Heart sounds: Normal heart sounds, S1 normal and S2 normal. No murmur. No friction rub. No gallop.    Pulmonary:      Effort: Pulmonary effort is normal. No respiratory distress.      Breath sounds: Normal breath sounds. No wheezing or rales.   Chest:      Chest wall: No tenderness.   Abdominal:      General: There is no distension.      Palpations: There is no mass.      Tenderness: There is no abdominal tenderness. There is no rebound.      Hernia: No hernia is present.   Musculoskeletal:      Left shoulder: He exhibits decreased range of motion and tenderness.   Lymphadenopathy:      Cervical: No cervical adenopathy.   Skin:     General: Skin is warm and dry.      Findings: No erythema or rash.   Neurological:      Mental Status: He is alert and oriented to person, place, and time. He is not disoriented.      Cranial Nerves: No cranial nerve deficit.      Motor: No abnormal muscle tone.      Coordination: Coordination normal.      Deep Tendon Reflexes: Reflexes are normal and symmetric. Reflexes normal.   Psychiatric:         Behavior: Behavior normal.         Thought Content: Thought content normal.         Judgment: Judgment normal. "           Lab Results   Component Value Date    CREATININE 1.6 (H) 10/26/2020    BUN 13 10/26/2020     10/26/2020    K 3.9 10/26/2020     10/26/2020    CO2 28 10/26/2020     Lab Results   Component Value Date    WBC 6.24 05/12/2020    HGB 14.9 05/12/2020    HCT 46.9 08/05/2020    MCV 92 05/12/2020     05/12/2020     Lab Results   Component Value Date    PTH 71.0 05/12/2020    CALCIUM 8.8 10/26/2020    PHOS 3.1 10/26/2020         Assessment:    )    1. Stage 2 chronic kidney disease    2. Hypokalemia    3. Essential hypertension        Plan:          1.  Chronic kidney disease stage 2: Hypertensive nephropathy with basement membrane thickening probably from morbid obesity. creatinine stable at 1.6 no heavy proteinuria.  GFR 70% and stable excellent prognosis.Avoid nonsteroidals; no Advil or Motrin; okay to take Tylenol for pain; if needs stronger pain medications please let us know      2.  Hypokalemia much better    3.  Essential hypertension:Need active lifestyle changes along with exercise diet control and aggressive blood pressure control. +mireya feedback     4.  Obesity dietary modification and lifestyle modifications discussed in detail positive feedback given to the patient.      5. Left shoulder pain : exercise d/w and PT consult warm compresses. Steroid medrol pack     Follow-up 1 year

## 2020-10-30 NOTE — PATIENT INSTRUCTIONS
1.  Chronic kidney disease stage 2: Hypertensive nephropathy with basement membrane thickening probably from morbid obesity. creatinine stable at 1.6 no heavy proteinuria.  GFR 70% and stable excellent prognosis.Avoid nonsteroidals; no Advil or Motrin; okay to take Tylenol for pain; if needs stronger pain medications please let us know      2.  Hypokalemia much better    3.  Essential hypertension:Need active lifestyle changes along with exercise diet control and aggressive blood pressure control. +mireya feedback     4.  Obesity dietary modification and lifestyle modifications discussed in detail positive feedback given to the patient.      5. Left shoulder pain : exercise d/w and PT consult warm compresses. Steroid medrol pack     Follow-up 1 year

## 2020-11-03 ENCOUNTER — CLINICAL SUPPORT (OUTPATIENT)
Dept: REHABILITATION | Facility: HOSPITAL | Age: 50
End: 2020-11-03
Attending: INTERNAL MEDICINE
Payer: MEDICAID

## 2020-11-03 DIAGNOSIS — M25.612 DECREASED RANGE OF MOTION OF LEFT SHOULDER: Primary | ICD-10-CM

## 2020-11-03 DIAGNOSIS — M25.512 ACUTE PAIN OF LEFT SHOULDER: ICD-10-CM

## 2020-11-03 PROCEDURE — 97162 PT EVAL MOD COMPLEX 30 MIN: CPT

## 2020-11-03 NOTE — PLAN OF CARE
[facility]  Physical Therapy Initial Evaluation    Name: Grant Rodriguez  Clinic Number: [id number]    Therapy Diagnosis:   Encounter Diagnoses   Name Primary?    Acute pain of left shoulder     Decreased range of motion of left shoulder      Physician: Colette Haider MD    Physician Orders: PT Eval and Treat  Medical Diagnosis from Referral: M25.512 Acute Shoulder Pain  Evaluation Date: 11/3/2020  Authorization Period Expiration: 12/3/2020  Plan of Care Expiration: 1/2/2021  Visit # / Visits authorized: 1/ 1    Precautions: Standard and HTN urgency    Time In: 1022  Time Out: 1105  Total Billable Time: 45 minutes    SUBJECTIVE   Date of onset: ~2 weeks ago, 10/20/2020    History of current condition - Grant is a 50 y.o. male whom reports complaints of left shoulder pain.  It started bothering him after he returned to exercising from a hiatus during covid-19.     MAI: unknown, possibly return to exercise  Physician Instructions (per patient): get into PT and do not any upper body and focus on legs and cardio.   Other concerns: n/a     Medical History:   Past Medical History:   Diagnosis Date    Hypertensive urgency 12/30/2019    Hypogonadism, male     IGT (impaired glucose tolerance)     Obesity     Prolactinoma        Surgical History:   Grant Rodriguez  has no past surgical history on file.    Medications:   Grant has a current medication list which includes the following prescription(s): amlodipine, cabergoline, hydralazine, hydrochlorothiazide, methylprednisolone, potassium chloride sa, sildenafil, silver sulfadiazine 1%, and testosterone cypionate.    Allergies:   Review of patient's allergies indicates:  No Known Allergies     Imaging: none     Prior Therapy: Yes, many years ago for his back (2012)  Social History: Pt lives alone: hasn't been able to do any heavy lifting or chores.  Gym/Home Equipment: [employer]- fully equipped.    Occupation:   Prior Level of  Function:  Independent with all ADLs/IADLs  Current Level of Function: 70% of function compared to PLOF.     Pain:  Current 0/10, worst 10/10, best 0/10   Location: Left Shoulder- anterior lateral shoulder  Description: Sharp, dull, achy  Aggravating factors: overworking back at the gym, driving with arm in a prolonged position  Relieving factors: steroids.     Dominant Extremity: Right    Pts goals: Pt reported goals are to get back to the upper body weights - specifically free weights and dumbbells.    OBJECTIVE   (x = not tested due to pain and/or inability to obtain test position)    RANGE OF MOTION:    Cervical Right   (spine)  11/3/2020 Left     11/3/2020 Pain/Dysfunction with Movement Goal   Cervical Flexion (60) WFL --- no pain or discomfort    Cervical Extension (90) WFL --- no pain or discomfort    Cervical Side Bending (45) WFL WFL no pain or discomfort    Cervical Rotation (75) WFL WFL no pain or discomfort      Shoulder AROM/PROM Right  11/3/2020 Left  11/3/2020 Pain/Dysfunction with Movement Goal   Shoulder Flexion (180) 165 152 Painful left 170   Shoulder ER (90) 95 65 Painful Left 90   Shoulder ER Fx (C7) C8 Occiput Painful left C7   Shoulder IR (T8) Spine T10 L1 Painful left T10       STRENGTH:    U/E MMT Right  11/3/2020 Left  11/3/2020 Pain/Dysfunction with Movement Goal   Shoulder Flexion 5/5 4-/5  5/5 B   Shoulder Abduction 5/5 4/5  5/5 B   Shoulder IR 5/5 4+/5  5/5 B   Shoulder ER   5/5 4-/5  5/5 B   Elbow Flexion  5/5 5/5  5/5 B   Elbow Extension 5/5 5/5  5/5 B       MUSCLE LENGTH:     Muscle Tested  Right  11/3/2020 Left   11/3/2020 Goal   Upper Trapezius  decreased decreased Normal B    Levator Scapulae  decreased decreased Normal B   Sternocleidomastoid decreased decreased Normal B   Scalenes  decreased decreased Normal B    Pectoralis Minor  decreased decreased Normal B   Pectoralis Major decreased decreased Normal B     JOINT MOBILITY:     Joint Motion Tested Right  (spine)  11/3/2020  Left   11/3/2020 Goal   GHJ Posterior Glide Normal Hypomobile Normal B   GHJ Lateral Glide Normal Hypomobile Normal B   GHJ Anterior Glide Hypermobile Normal Normal B       SPECIAL TESTS:     Right  (spine)  11/3/2020 Left   11/3/2020 Goal   Jade Aris Negative Positive Negative B    Neers Negative Positive Negative B    Empty Can Negative Positiive Negative B    Full Can Negative Positive Negative B        Sensation:  Sensation is intact to light touch    Palpation: Increased tenderness to touch along: anterior shoulder, AC joint, GHJ Rim  Posture:  Pt presents with postural abnormalities which include: forward head and rounded shoulders     FUNCTION:     CMS Impairment/Limitation/Restriction for FOTO Shoulder Survey    Therapist reviewed FOTO scores for Grant Rodriguez on 11/3/2020.   FOTO documents entered into [address] - see Media section.    Limitation Score: 27%         TREATMENT     EVALUATION ONLY    Grant received the following manual therapy techniques: Joint mobilizations, Myofacial release and Soft tissue Mobilization were applied to the: left for (NO CHARGE), including:    Manual Intervention 11/3/2020    Soft Tissue Massage     Joint Mobility X Grade (I-III); GHJ oscillations/AP/Lat/SupInf     Grade (I-III); Scapular Thoracic Med/Lat/Sup/Inf/UR/DR   PROM X Supine: Flexion/Abduction/IR/ER     Side-lying: Scapular ADD, Shoulder Flexion     Elbow Flexion/extension             x = exercises performed today    Grant received therapeutic exercises to develop strength, endurance, ROM, flexibility and posture for (NO CHARGE) minutes including:     Ther Exercise  11/3/2020    Warm Up: none     Shoulder Flexion- wall slide X 10s holds x5   Pectoral Stretch- doorway X 10s holds x5   Scapular Retractions X 10s holds x5                       x = exercises performed today    Home Exercises and Patient Education Provided    Education/Self-Care provided: (NO CHARGE)   Patient educated on the impairments noted above  and the effects of physical therapy intervention to improve overall condition and QOL.   · Exercise regiment: discontinue all upper body exercises at the gym, to be substituted with HEP for the time being      Written Home Exercises Provided: Yes  Exercises were reviewed and Grant was able to demonstrate them prior to the end of the session.  Grant demonstrated good  understanding of the education provided.     See EMR under Patient Instructions for exercises provided 11/3/2020.    ASSESSMENT     Grant is a 50 y.o. male referred to outpatient Physical Therapy with a medical diagnosis of acute shoulder pain of the left shoulder.  Physical exam supports decreased shoulder girdle ROM, decreased scapular and shoulder strength, Glenohumeral joint hypomobility, impaired posture, and impaired functional mobility. The above impairments will be addressed through manual therapy techniques, therapeutic exercises, functional training, and modalities as necessary. Patient was treated and educated on exercises for home program, progression of therapy, and benefits of therapy to achieve full functional mobility.     Pt prognosis is Good.   Pt will benefit from skilled outpatient Physical Therapy to address the deficits stated above and in the chart below, provide pt/family education, and to maximize pt's level of independence.     Plan of care discussed with patient: Yes  Pt's spiritual, cultural and educational needs considered and patient is agreeable to the plan of care and goals as stated below:     Anticipated Barriers for therapy: sedentary lifestyle    Medical Necessity is demonstrated by the following  History  Co-morbidities and personal factors that may impact the plan of care Co-morbidities:   CKD stage 3 and HTN    Personal Factors:   no deficits     moderate   Examination  Body Structures and Functions, activity limitations and participation restrictions that may impact the plan of care Body Regions:   upper  "extremities    Body Systems:    gross symmetry  ROM  strength  motor control  motor learning    Participation Restrictions:   See above in "Current Level of Function"     Activity limitations:   Learning and applying knowledge  no deficits    General Tasks and Commands  no deficits    Communication  no deficits    Mobility  lifting and carrying objects  driving (bike, car, motorcycle)    Self care  no deficits    Domestic Life  no deficits    Interactions/Relationships  no deficits    Life Areas  no deficits    Community and Social Life  community life  recreation and leisure         moderate   Clinical Presentation stable and uncomplicated low   Decision Making/ Complexity Score: low       GOALS:    Short Term Goals:  4 weeks  Progress   1. Pain: Pt will demonstrate improved pain by reports of less than or equal to 5/10 worst pain on the verbal rating scale in order to progress toward maximal functional ability and improve QOL.    2. Function: Patient will demonstrate improved function as indicated by a functional limitation score of less than or equal to 25 out of 100 on FOTO.    3. Mobility: Patient will improve AROM to 50% of stated goals, listed in objective measures above, in order to progress towards independence with functional activities.     4. Strength: Patient will improve strength to 50% of stated goals, listed in objective measures above, in order to progress towards independence with functional activities.     5. HEP: Patient will demonstrate independence with HEP in order to progress toward functional independence.    6.       Long Term Goals:  8 weeks  Progress   1. Pain: Pt will demonstrate improved pain by reports of less than or equal to 2/10 worst pain on the verbal rating scale in order to progress toward maximal functional ability and improve QOL.      2. Function: Patient will demonstrate improved function as indicated by a functional limitation score of less than or equal to <20 out of 100 " on FOTO.    3. Mobility: Patient will improve AROM to stated goals, listed in objective measures above, in order to return to maximal functional potential and improve quality of life.    4. Strength: Patient will improve strength to stated goals, listed in objective measures above, in order to improve functional independence and quality of life.    5. Gait: Patient will demonstrate normalized gait mechanics with minimal compensation in order to return to PLOF.    6. Patient will return to normal ADL's, IADL's, community involvement, recreational activities, and work-related activities with less than or equal to 2/10 pain and maximal function.     7.         PLAN   Plan of care Certification: 11/3/2020 to 1/2/2021.    Outpatient Physical Therapy 1 times weekly for 8 weeks to include any combination of the following interventions: virtual visits, dry needling, modalities, electrical stimulation (IFC, Pre-Mod, Attended with Functional Dry Needling), Gait Training, Manual Therapy, Moist Heat/ Ice, Neuromuscular Re-ed, Patient Education, Self Care, Therapeutic Activites and Therapeutic Exercise     Thank you for this referral.    These services are reasonable and necessary for the conditions set forth above while under my care.    Trudi Toribio, PT, DPT

## 2020-12-11 ENCOUNTER — PATIENT MESSAGE (OUTPATIENT)
Dept: OTHER | Facility: OTHER | Age: 50
End: 2020-12-11

## 2020-12-23 ENCOUNTER — CLINICAL SUPPORT (OUTPATIENT)
Dept: INTERNAL MEDICINE | Facility: CLINIC | Age: 50
End: 2020-12-23
Payer: MEDICAID

## 2020-12-23 DIAGNOSIS — E66.9 CLASS 2 OBESITY WITH BODY MASS INDEX (BMI) OF 39.0 TO 39.9 IN ADULT, UNSPECIFIED OBESITY TYPE, UNSPECIFIED WHETHER SERIOUS COMORBIDITY PRESENT: ICD-10-CM

## 2020-12-23 PROCEDURE — 97802 MEDICAL NUTRITION INDIV IN: CPT | Mod: 95,,, | Performed by: DIETITIAN, REGISTERED

## 2020-12-23 PROCEDURE — 97802 PR MED NUTR THER, 1ST, INDIV, EA 15 MIN: ICD-10-PCS | Mod: 95,,, | Performed by: DIETITIAN, REGISTERED

## 2020-12-23 NOTE — PROGRESS NOTES
"The patient location is: his home  The chief complaint leading to consultation is: nutrition counseling for weight management    Visit type: audiovisual    Face to Face time with patient: 50 minutes  80 minutes of total time spent on the encounter, which includes face to face time and non-face to face time preparing to see the patient (eg, review of tests), Obtaining and/or reviewing separately obtained history, Documenting clinical information in the electronic or other health record, Independently interpreting results (not separately reported) and communicating results to the patient/family/caregiver, or Care coordination (not separately reported).         Each patient to whom he or she provides medical services by telemedicine is:  (1) informed of the relationship between the physician and patient and the respective role of any other health care provider with respect to management of the patient; and (2) notified that he or she may decline to receive medical services by telemedicine and may withdraw from such care at any time.    Notes:   Nutrition Assessment  Client name:  Grant Rodriguez  :  1970  Age:  50 y.o.  Gender:  male    Client states:  Pleasant gentleman present for virtual nutrition appointment for weight management. Note: Video cut out half way through, so switched to audio only after that point. Patient referred by Colette Haider MD. Reports wanting to lose weight. Reports biggest issue is most likely alcohol (consumes alcohol 3-4x/week with 3-4 drinks each of those days, flavored whiskey). Food and exercise history provided below. Prior to COVID-19 patient was exercising, but hasn't since restrictions started. Plans to start exercising again this coming Monday. Patient with goal to weigh 200 lbs.    Anthropometrics (10-)  Height:  5' 9"     Weight:  265 lbs  BMI:  39.1  % Body Fat:  n/a    Clinical Signs/Symptoms  N/V/D:  none  Appetite:  good       Past Medical History: "   Diagnosis Date    Hypertensive urgency 12/30/2019    Hypogonadism, male     IGT (impaired glucose tolerance)     Obesity     Prolactinoma        No past surgical history on file.    Medications    has a current medication list which includes the following prescription(s): amlodipine, cabergoline, hydralazine, hydrochlorothiazide, potassium chloride sa, sildenafil, silver sulfadiazine 1%, and testosterone cypionate.    Vitamins, Minerals, and/or Supplements:  none     Food/Medication Interactions:  Reviewed     Food Allergies or Intolerances:  NKFA     Social History    Marital status:  Single  Employment:  Place of employment not disucssed    Social History     Tobacco Use    Smoking status: Current Every Day Smoker     Packs/day: 1.00     Years: 20.00     Pack years: 20.00   Substance Use Topics    Alcohol use: No     Frequency: 2-4 times a month     Drinks per session: 1 or 2     Binge frequency: Less than monthly        Lab Reports (12-2-2019)  Total Cholesterol:  173    Triglycerides:  104  HDL:  55  LDL:  97.2   Glucose:  89  HbA1c:  n/a  BP Readings from Last 1 Encounters:   10/30/20 130/60       Food History  Typically consumes 3-4 meals daily. Eats sandwiches frequently, sometimes for various meals throughout the day.  Dining out/fast food: 1x/week (recently cut back)  Likes variety of vegetables, consumed daily.  Likes some fruits, not consumed daily.  Meals enjoyed: sandwich, pizza (once every 2 week), pan kaufman fish, beans, shrimp and sausage creole, baked pork chops  *Fluid intake:  1 gingerale daily, water    Exercise History:  None currently; plans to resume previous routine: 45-60 minutes cardio 3x/week    Cultural/Spiritual/Personal Preferences:  None identified    Support System:  none noted    State of Change:  Preparation    Barriers to Change:  none    Diagnosis    Other: Obesity related to excess energy intake as evidenced by BMI 39.    Intervention    RMR (Method:  Audrey Castillo):   2053 kcal  Activity Factor:  1.3    BRITTNEY:  2668 - 500 = 2168 kcal    Goals:  1.  Aim for 5% weight loss over the course of 3 months to work towards goal of 200 lbs.  2.  Improve portion sizes to model Plate Method.  3. Cardio for 45-60 minutes 3x/week      Nutrition Education  Reviewed: Plate Method, Healthy Snack List, Meal Planning Guide. Discussed ways to improve current meals patient enjoys to better model proper portions as outlined in Plate Method. Discussed sugar/calorie content of beverage choices. Encouraged patient to follow through with exercise routine mentioned. Discussed adding variety to exercise routine. Set goals with patient. Calculated daily calories for patient to follow if he wishes to track intake using My Fitness Pal. Provided contact information and emailed handouts. Encouraged patient to contact me if he feels the need for a follow-up appointment.    Patient verbalized understanding of nutrition education and recommendations received.    Handouts Provided (emailed)  Meal Planning Guide  Restaurant Guide  Eat Fit Shopping List  Fast Food Guide  Satisfying Salads  Plate Method  Healthy Snack List    Monitoring/Evaluation    Monitor the following:  Weight  BMI  Caloric intake      Follow Up Plan:  No follow-up has been scheduled. Patient will call to schedule one if needed.

## 2021-01-01 NOTE — SUBJECTIVE & OBJECTIVE
Past Medical History:   Diagnosis Date    Hypogonadism, male     IGT (impaired glucose tolerance)     Obesity     Prolactinoma        History reviewed. No pertinent surgical history.    Review of patient's allergies indicates:  No Known Allergies    Family History     Problem Relation (Age of Onset)    Diabetes Father    Hypertension Father        Tobacco Use    Smoking status: Current Every Day Smoker     Packs/day: 1.00     Years: 20.00     Pack years: 20.00   Substance and Sexual Activity    Alcohol use: No     Frequency: 2-4 times a month     Drinks per session: 1 or 2     Binge frequency: Less than monthly    Drug use: No    Sexual activity: Yes     Partners: Female         Review of Systems   All other systems reviewed and are negative.    Objective:     Vital Signs (Most Recent):  Temp: 99.2 °F (37.3 °C) (12/31/19 0705)  Pulse: 77 (12/31/19 0715)  Resp: 15 (12/31/19 0715)  BP: (!) 158/78 (12/31/19 0715)  SpO2: 98 % (12/31/19 0715) Vital Signs (24h Range):  Temp:  [98.1 °F (36.7 °C)-99.2 °F (37.3 °C)] 99.2 °F (37.3 °C)  Pulse:  [] 77  Resp:  [12-27] 15  SpO2:  [95 %-100 %] 98 %  BP: (127-222)/() 158/78     Weight: 112 kg (246 lb 14.6 oz)  Body mass index is 36.46 kg/m².      Intake/Output Summary (Last 24 hours) at 12/31/2019 1040  Last data filed at 12/31/2019 0500  Gross per 24 hour   Intake --   Output 325 ml   Net -325 ml       Physical Exam   Constitutional: He is oriented to person, place, and time. He appears well-developed and well-nourished.   HENT:   Head: Normocephalic and atraumatic.   Nose: Nose normal.   Mouth/Throat: Oropharynx is clear and moist.   Eyes: Pupils are equal, round, and reactive to light. EOM are normal.   Neck: Normal range of motion. Neck supple.   Cardiovascular: Normal rate, regular rhythm and normal heart sounds.   Pulmonary/Chest: Effort normal and breath sounds normal. No stridor. No respiratory distress. He has no wheezes.   Abdominal: Soft. Bowel sounds  are normal.   Musculoskeletal: Normal range of motion. He exhibits no edema.   Neurological: He is alert and oriented to person, place, and time. No cranial nerve deficit.   Skin: Skin is warm and dry. Capillary refill takes 2 to 3 seconds.   Psychiatric: He has a normal mood and affect.   Nursing note and vitals reviewed.      Vents:  Oxygen Concentration (%): 21 (12/31/19 0315)    Lines/Drains/Airways     Peripheral Intravenous Line                 Peripheral IV - Single Lumen 12/30/19 1220 20 G Right Antecubital less than 1 day         Peripheral IV - Single Lumen 12/30/19 1753 20 G Left Hand less than 1 day                Significant Labs:    CBC/Anemia Profile:  Recent Labs   Lab 12/30/19  1220 12/30/19  1453 12/30/19  1754 12/30/19  2359 12/31/19  0347   WBC 14.64*  --   --   --  13.17*   HGB 18.1*  --  17.8 17.3 17.1  17.1   HCT 53.7  --   --   --  50.8     --   --   --  191   MCV 91  --   --   --  90   RDW 14.0  --   --   --  14.4   OCCULTBLOOD  --  Positive*  --   --   --         Chemistries:  Recent Labs   Lab 12/30/19  1329 12/31/19  0347    137   K 4.0 3.4*   CL 94* 98   CO2 29 25   BUN 16 24*   CREATININE 1.5* 1.7*   CALCIUM 9.6 8.8   ALBUMIN 4.1  --    PROT 8.1  --    BILITOT 1.0  --    ALKPHOS 71  --    ALT 24  --    AST 26  --    MG  --  2.1   PHOS  --  4.4       A1C: No results for input(s): HGBA1C in the last 48 hours.  ABGs: No results for input(s): PH, PCO2, HCO3, POCSATURATED, BE in the last 48 hours.  Cardiac Markers: No results for input(s): CKMB, TROPONINT, MYOGLOBIN in the last 48 hours.  Lactic Acid: No results for input(s): LACTATE in the last 48 hours.  POCT Glucose: No results for input(s): POCTGLUCOSE in the last 48 hours.  Troponin:   Recent Labs   Lab 12/30/19  1329 12/30/19  1905 12/31/19  0113   TROPONINI 0.019 0.035* 0.033*     All pertinent labs within the past 24 hours have been reviewed.    Significant Imaging:   I have reviewed and interpreted all pertinent  imaging results/findings within the past 24 hours.     Sleep study    PHYSICIAN INTERPRETATION AND COMMENTS: Findings are consistent with moderate, positional obstructive  sleep apnea (JILLIAN). AHI was 20.0/hr with 4.8 hours sleep. SpO2 tayo was 87.8%. Treatment indicated with CPAP. Please  refer to sleep disorders clinic  for prompt evaluation and management. AutoPAP 5-20 cmwp with mask of choice.  CLINICAL HISTORY: 49 year old male presented with: Snoring and fragmented sleep. 16 inch neck, BMI of 38, an  [place] sleepiness score of 6, no co-morbidities and symptoms of nocturnal snoring and witnessed apneas. Based on the  clinical history, the patient has a high pre-test probability of having severe JILLIAN.  SLEEP STUDY FINDINGS: Patient underwent a one night Home Sleep Test and by behavioral criteria, slept for  approximately 4.8 hours, with a sleep latency of 3 minutes and a sleep efficiency of 72.7%. Moderate sleep disordered  breathing (AHI=20) is noted based on a 4% hypopnea desaturation criteria, predominantly in the supine position (22  events/hour). The patient slept supine 88.7% of the night based on valid recording time of 4.8 hours and is 5.5 times as likely to  have apneas/hypopneas when supine. When considering more subtle measures of sleep disordered breathing, the overall  respiratory disturbance index is also moderate (RDI=38) based on a 1% hypopnea desaturation criteria with confirmation by  surrogate arousal indicators. The apneas/hypopneas are accompanied by minimal oxygen desaturation (percent time below 90%  SpO2: 0.3%, Min SpO2: 87.8%). The average desaturation across all sleep disordered breathing events is 2.9%. Snoring  occurs for 31.9% (30 dB) of the study, 3.7% is very loud. The mean pulse rate is 54 BPM.  TREATMENT CONSIDERATIONS: Consider nasal continuous positive airway pressure (CPAP/AutoPAP) as the initial  treatment choice based on the AHI severity and co-morbidities. A  mandibular advancement splint (MAS) or referral to an  ENT surgeon for modification to the airway should be considered to reduce the potential contribution of JILLIAN on existing  diseases if the patient prefers an alternative therapy or the CPAP trial is unsuccessful. A Mandibular Advancement Splint  (MAS) will likely provide treatment benefit independent of JILLIAN severity. The patient should avoid sleeping supine given  non-supine AHI is in the normal range.  DISEASE MANAGEMENT CONSIDERATIONS: None.   [id number]

## 2021-01-20 ENCOUNTER — OFFICE VISIT (OUTPATIENT)
Dept: INTERNAL MEDICINE | Facility: CLINIC | Age: 51
End: 2021-01-20
Payer: COMMERCIAL

## 2021-01-20 ENCOUNTER — PATIENT MESSAGE (OUTPATIENT)
Dept: INTERNAL MEDICINE | Facility: CLINIC | Age: 51
End: 2021-01-20

## 2021-01-20 ENCOUNTER — HOSPITAL ENCOUNTER (OUTPATIENT)
Dept: RADIOLOGY | Facility: HOSPITAL | Age: 51
Discharge: HOME | End: 2021-01-20
Attending: NURSE PRACTITIONER
Payer: COMMERCIAL

## 2021-01-20 VITALS
BODY MASS INDEX: 38.95 KG/M2 | RESPIRATION RATE: 16 BRPM | WEIGHT: 263 LBS | HEIGHT: 69 IN | DIASTOLIC BLOOD PRESSURE: 60 MMHG | TEMPERATURE: 98 F | HEART RATE: 78 BPM | OXYGEN SATURATION: 97 % | SYSTOLIC BLOOD PRESSURE: 96 MMHG

## 2021-01-20 DIAGNOSIS — R73.02 IGT (IMPAIRED GLUCOSE TOLERANCE): ICD-10-CM

## 2021-01-20 DIAGNOSIS — N63.10 BREAST MASS, RIGHT: ICD-10-CM

## 2021-01-20 DIAGNOSIS — R20.0 NUMBNESS AND TINGLING IN BOTH HANDS: ICD-10-CM

## 2021-01-20 DIAGNOSIS — I70.1 RENAL ARTERY STENOSIS: ICD-10-CM

## 2021-01-20 DIAGNOSIS — Z00.00 ANNUAL PHYSICAL EXAM: Primary | ICD-10-CM

## 2021-01-20 DIAGNOSIS — N40.0 BENIGN PROSTATIC HYPERPLASIA WITHOUT LOWER URINARY TRACT SYMPTOMS: ICD-10-CM

## 2021-01-20 DIAGNOSIS — G47.33 OSA (OBSTRUCTIVE SLEEP APNEA): ICD-10-CM

## 2021-01-20 DIAGNOSIS — N18.30 STAGE 3 CHRONIC KIDNEY DISEASE, UNSPECIFIED WHETHER STAGE 3A OR 3B CKD: ICD-10-CM

## 2021-01-20 DIAGNOSIS — R20.2 NUMBNESS AND TINGLING IN BOTH HANDS: ICD-10-CM

## 2021-01-20 DIAGNOSIS — E29.1 MALE HYPOGONADISM: ICD-10-CM

## 2021-01-20 PROBLEM — M25.512 ACUTE PAIN OF LEFT SHOULDER: Status: RESOLVED | Noted: 2020-11-03 | Resolved: 2021-01-20

## 2021-01-20 PROBLEM — N17.9 AKI (ACUTE KIDNEY INJURY): Status: RESOLVED | Noted: 2020-01-01 | Resolved: 2021-01-20

## 2021-01-20 PROCEDURE — 71046 X-RAY EXAM CHEST 2 VIEWS: CPT | Mod: TC

## 2021-01-20 PROCEDURE — 99396 PR PREVENTIVE VISIT,EST,40-64: ICD-10-PCS | Mod: S$GLB,,, | Performed by: NURSE PRACTITIONER

## 2021-01-20 PROCEDURE — 99396 PREV VISIT EST AGE 40-64: CPT | Mod: S$GLB,,, | Performed by: NURSE PRACTITIONER

## 2021-01-20 PROCEDURE — 3074F SYST BP LT 130 MM HG: CPT | Mod: CPTII,S$GLB,, | Performed by: NURSE PRACTITIONER

## 2021-01-20 PROCEDURE — 71046 X-RAY EXAM CHEST 2 VIEWS: CPT | Mod: 26,,, | Performed by: RADIOLOGY

## 2021-01-20 PROCEDURE — 3078F DIAST BP <80 MM HG: CPT | Mod: CPTII,S$GLB,, | Performed by: NURSE PRACTITIONER

## 2021-01-20 PROCEDURE — 3078F PR MOST RECENT DIASTOLIC BLOOD PRESSURE < 80 MM HG: ICD-10-PCS | Mod: CPTII,S$GLB,, | Performed by: NURSE PRACTITIONER

## 2021-01-20 PROCEDURE — 3008F BODY MASS INDEX DOCD: CPT | Mod: CPTII,S$GLB,, | Performed by: NURSE PRACTITIONER

## 2021-01-20 PROCEDURE — 3074F PR MOST RECENT SYSTOLIC BLOOD PRESSURE < 130 MM HG: ICD-10-PCS | Mod: CPTII,S$GLB,, | Performed by: NURSE PRACTITIONER

## 2021-01-20 PROCEDURE — 99999 PR PBB SHADOW E&M-EST. PATIENT-LVL V: ICD-10-PCS | Mod: PBBFAC,,, | Performed by: NURSE PRACTITIONER

## 2021-01-20 PROCEDURE — 3008F PR BODY MASS INDEX (BMI) DOCUMENTED: ICD-10-PCS | Mod: CPTII,S$GLB,, | Performed by: NURSE PRACTITIONER

## 2021-01-20 PROCEDURE — 71046 XR CHEST PA AND LATERAL: ICD-10-PCS | Mod: 26,,, | Performed by: RADIOLOGY

## 2021-01-20 PROCEDURE — 99999 PR PBB SHADOW E&M-EST. PATIENT-LVL V: CPT | Mod: PBBFAC,,, | Performed by: NURSE PRACTITIONER

## 2021-01-20 RX ORDER — IBUPROFEN 800 MG/1
TABLET ORAL
COMMUNITY
Start: 2021-01-18 | End: 2022-01-24

## 2021-01-20 RX ORDER — MUPIROCIN 20 MG/G
OINTMENT TOPICAL
COMMUNITY
Start: 2021-01-18 | End: 2022-01-24

## 2021-01-20 RX ORDER — SULFAMETHOXAZOLE AND TRIMETHOPRIM 800; 160 MG/1; MG/1
TABLET ORAL
COMMUNITY
Start: 2021-01-18 | End: 2022-01-24

## 2021-01-21 ENCOUNTER — TELEPHONE (OUTPATIENT)
Dept: PHYSICAL MEDICINE AND REHAB | Facility: CLINIC | Age: 51
End: 2021-01-21

## 2021-01-21 ENCOUNTER — PATIENT MESSAGE (OUTPATIENT)
Dept: INTERNAL MEDICINE | Facility: CLINIC | Age: 51
End: 2021-01-21

## 2021-01-21 ENCOUNTER — TELEPHONE (OUTPATIENT)
Dept: INTERNAL MEDICINE | Facility: CLINIC | Age: 51
End: 2021-01-21

## 2021-01-21 ENCOUNTER — LAB VISIT (OUTPATIENT)
Dept: LAB | Facility: HOSPITAL | Age: 51
End: 2021-01-21
Attending: NURSE PRACTITIONER
Payer: COMMERCIAL

## 2021-01-21 ENCOUNTER — PATIENT MESSAGE (OUTPATIENT)
Dept: PHYSICAL MEDICINE AND REHAB | Facility: CLINIC | Age: 51
End: 2021-01-21

## 2021-01-21 DIAGNOSIS — R73.02 IGT (IMPAIRED GLUCOSE TOLERANCE): ICD-10-CM

## 2021-01-21 DIAGNOSIS — Z00.00 ANNUAL PHYSICAL EXAM: ICD-10-CM

## 2021-01-21 LAB
ALBUMIN SERPL BCP-MCNC: 3.4 G/DL (ref 3.5–5.2)
ALP SERPL-CCNC: 68 U/L (ref 55–135)
ALT SERPL W/O P-5'-P-CCNC: 15 U/L (ref 10–44)
ANION GAP SERPL CALC-SCNC: 8 MMOL/L (ref 8–16)
AST SERPL-CCNC: 14 U/L (ref 10–40)
BASOPHILS # BLD AUTO: 0.05 K/UL (ref 0–0.2)
BASOPHILS NFR BLD: 0.8 % (ref 0–1.9)
BILIRUB SERPL-MCNC: 0.2 MG/DL (ref 0.1–1)
BUN SERPL-MCNC: 19 MG/DL (ref 6–20)
CALCIUM SERPL-MCNC: 8.6 MG/DL (ref 8.7–10.5)
CHLORIDE SERPL-SCNC: 106 MMOL/L (ref 95–110)
CHOLEST SERPL-MCNC: 155 MG/DL (ref 120–199)
CHOLEST/HDLC SERPL: 3.3 {RATIO} (ref 2–5)
CO2 SERPL-SCNC: 23 MMOL/L (ref 23–29)
CREAT SERPL-MCNC: 1.6 MG/DL (ref 0.5–1.4)
DIFFERENTIAL METHOD: ABNORMAL
EOSINOPHIL # BLD AUTO: 0.4 K/UL (ref 0–0.5)
EOSINOPHIL NFR BLD: 6.1 % (ref 0–8)
ERYTHROCYTE [DISTWIDTH] IN BLOOD BY AUTOMATED COUNT: 13 % (ref 11.5–14.5)
EST. GFR  (AFRICAN AMERICAN): 56.8 ML/MIN/1.73 M^2
EST. GFR  (NON AFRICAN AMERICAN): 49.2 ML/MIN/1.73 M^2
ESTIMATED AVG GLUCOSE: 111 MG/DL (ref 68–131)
GLUCOSE SERPL-MCNC: 100 MG/DL (ref 70–110)
HBA1C MFR BLD HPLC: 5.5 % (ref 4–5.6)
HCT VFR BLD AUTO: 45.8 % (ref 40–54)
HDLC SERPL-MCNC: 47 MG/DL (ref 40–75)
HDLC SERPL: 30.3 % (ref 20–50)
HGB BLD-MCNC: 15.4 G/DL (ref 14–18)
IMM GRANULOCYTES # BLD AUTO: 0.01 K/UL (ref 0–0.04)
IMM GRANULOCYTES NFR BLD AUTO: 0.2 % (ref 0–0.5)
LDLC SERPL CALC-MCNC: 88.8 MG/DL (ref 63–159)
LYMPHOCYTES # BLD AUTO: 1.5 K/UL (ref 1–4.8)
LYMPHOCYTES NFR BLD: 23.2 % (ref 18–48)
MCH RBC QN AUTO: 31.6 PG (ref 27–31)
MCHC RBC AUTO-ENTMCNC: 33.6 G/DL (ref 32–36)
MCV RBC AUTO: 94 FL (ref 82–98)
MONOCYTES # BLD AUTO: 0.4 K/UL (ref 0.3–1)
MONOCYTES NFR BLD: 6.5 % (ref 4–15)
NEUTROPHILS # BLD AUTO: 4.1 K/UL (ref 1.8–7.7)
NEUTROPHILS NFR BLD: 63.2 % (ref 38–73)
NONHDLC SERPL-MCNC: 108 MG/DL
NRBC BLD-RTO: 0 /100 WBC
PLATELET # BLD AUTO: 225 K/UL (ref 150–350)
PMV BLD AUTO: 10.7 FL (ref 9.2–12.9)
POTASSIUM SERPL-SCNC: 4.1 MMOL/L (ref 3.5–5.1)
PROT SERPL-MCNC: 6.6 G/DL (ref 6–8.4)
RBC # BLD AUTO: 4.87 M/UL (ref 4.6–6.2)
SODIUM SERPL-SCNC: 137 MMOL/L (ref 136–145)
TRIGL SERPL-MCNC: 96 MG/DL (ref 30–150)
TSH SERPL DL<=0.005 MIU/L-ACNC: 2.21 UIU/ML (ref 0.4–4)
WBC # BLD AUTO: 6.42 K/UL (ref 3.9–12.7)

## 2021-01-21 PROCEDURE — 84146 ASSAY OF PROLACTIN: CPT

## 2021-01-21 PROCEDURE — 80053 COMPREHEN METABOLIC PANEL: CPT

## 2021-01-21 PROCEDURE — 85025 COMPLETE CBC W/AUTO DIFF WBC: CPT

## 2021-01-21 PROCEDURE — 80061 LIPID PANEL: CPT

## 2021-01-21 PROCEDURE — 84443 ASSAY THYROID STIM HORMONE: CPT

## 2021-01-21 PROCEDURE — 36415 COLL VENOUS BLD VENIPUNCTURE: CPT

## 2021-01-21 PROCEDURE — 83036 HEMOGLOBIN GLYCOSYLATED A1C: CPT

## 2021-01-22 LAB — PROLACTIN SERPL IA-MCNC: 0.8 NG/ML (ref 3.5–19.4)

## 2021-01-25 ENCOUNTER — TELEPHONE (OUTPATIENT)
Dept: INTERNAL MEDICINE | Facility: CLINIC | Age: 51
End: 2021-01-25

## 2021-01-27 ENCOUNTER — HOSPITAL ENCOUNTER (OUTPATIENT)
Dept: RADIOLOGY | Facility: HOSPITAL | Age: 51
Discharge: HOME | End: 2021-01-27
Attending: NURSE PRACTITIONER
Payer: COMMERCIAL

## 2021-01-27 DIAGNOSIS — N63.10 BREAST MASS, RIGHT: ICD-10-CM

## 2021-01-27 PROCEDURE — 76642 ULTRASOUND BREAST LIMITED: CPT | Mod: TC,RT

## 2021-01-27 PROCEDURE — 77062 MAMMO DIGITAL DIAGNOSTIC BILAT WITH TOMO: ICD-10-PCS | Mod: 26,,, | Performed by: RADIOLOGY

## 2021-01-27 PROCEDURE — 77066 DX MAMMO INCL CAD BI: CPT | Mod: 26,,, | Performed by: RADIOLOGY

## 2021-01-27 PROCEDURE — 77062 BREAST TOMOSYNTHESIS BI: CPT | Mod: TC

## 2021-01-27 PROCEDURE — 77062 BREAST TOMOSYNTHESIS BI: CPT | Mod: 26,,, | Performed by: RADIOLOGY

## 2021-01-27 PROCEDURE — 76642 US BREAST RIGHT LIMITED: ICD-10-PCS | Mod: 26,RT,, | Performed by: RADIOLOGY

## 2021-01-27 PROCEDURE — 76642 ULTRASOUND BREAST LIMITED: CPT | Mod: 26,RT,, | Performed by: RADIOLOGY

## 2021-01-27 PROCEDURE — 77066 MAMMO DIGITAL DIAGNOSTIC BILAT WITH TOMO: ICD-10-PCS | Mod: 26,,, | Performed by: RADIOLOGY

## 2021-01-28 DIAGNOSIS — N60.09 CYST OF BREAST, UNSPECIFIED LATERALITY: Primary | ICD-10-CM

## 2021-01-28 DIAGNOSIS — N64.1 FAT NECROSIS (SEGMENTAL) OF BREAST: ICD-10-CM

## 2021-01-28 RX ORDER — AMLODIPINE BESYLATE 10 MG/1
10 TABLET ORAL DAILY
Qty: 30 TABLET | Refills: 11 | Status: SHIPPED | OUTPATIENT
Start: 2021-01-28 | End: 2022-02-05 | Stop reason: SDUPTHER

## 2021-01-28 RX ORDER — HYDRALAZINE HYDROCHLORIDE 25 MG/1
25 TABLET, FILM COATED ORAL 3 TIMES DAILY
Qty: 90 TABLET | Refills: 1 | Status: SHIPPED | OUTPATIENT
Start: 2021-01-28 | End: 2021-08-19 | Stop reason: SDUPTHER

## 2021-01-28 RX ORDER — HYDROCHLOROTHIAZIDE 25 MG/1
25 TABLET ORAL DAILY
Qty: 30 TABLET | Refills: 11 | OUTPATIENT
Start: 2021-01-28 | End: 2022-01-28

## 2021-01-29 RX ORDER — HYDROCHLOROTHIAZIDE 25 MG/1
25 TABLET ORAL DAILY
Qty: 30 TABLET | Refills: 5 | Status: SHIPPED | OUTPATIENT
Start: 2021-01-29 | End: 2021-08-19 | Stop reason: SDUPTHER

## 2021-02-01 ENCOUNTER — OFFICE VISIT (OUTPATIENT)
Dept: SURGERY | Facility: CLINIC | Age: 51
End: 2021-02-01
Payer: COMMERCIAL

## 2021-02-01 ENCOUNTER — OFFICE VISIT (OUTPATIENT)
Dept: PODIATRY | Facility: CLINIC | Age: 51
End: 2021-02-01
Payer: COMMERCIAL

## 2021-02-01 ENCOUNTER — PATIENT OUTREACH (OUTPATIENT)
Dept: ADMINISTRATIVE | Facility: OTHER | Age: 51
End: 2021-02-01

## 2021-02-01 VITALS
HEART RATE: 67 BPM | SYSTOLIC BLOOD PRESSURE: 107 MMHG | TEMPERATURE: 99 F | WEIGHT: 262.38 LBS | SYSTOLIC BLOOD PRESSURE: 138 MMHG | WEIGHT: 262.81 LBS | HEART RATE: 71 BPM | HEIGHT: 69 IN | DIASTOLIC BLOOD PRESSURE: 71 MMHG | BODY MASS INDEX: 38.74 KG/M2 | DIASTOLIC BLOOD PRESSURE: 84 MMHG | BODY MASS INDEX: 38.93 KG/M2

## 2021-02-01 DIAGNOSIS — N60.09 CYST OF BREAST, UNSPECIFIED LATERALITY: ICD-10-CM

## 2021-02-01 DIAGNOSIS — N64.1 FAT NECROSIS (SEGMENTAL) OF BREAST: ICD-10-CM

## 2021-02-01 DIAGNOSIS — L60.0 INGROWN TOENAIL OF RIGHT FOOT: Primary | ICD-10-CM

## 2021-02-01 DIAGNOSIS — B35.1 DERMATOPHYTOSIS OF NAIL: ICD-10-CM

## 2021-02-01 PROCEDURE — 3078F DIAST BP <80 MM HG: CPT | Mod: CPTII,S$GLB,, | Performed by: SURGERY

## 2021-02-01 PROCEDURE — 99999 PR PBB SHADOW E&M-EST. PATIENT-LVL IV: ICD-10-PCS | Mod: PBBFAC,,, | Performed by: SURGERY

## 2021-02-01 PROCEDURE — 3075F PR MOST RECENT SYSTOLIC BLOOD PRESS GE 130-139MM HG: ICD-10-PCS | Mod: CPTII,S$GLB,, | Performed by: PODIATRIST

## 2021-02-01 PROCEDURE — 1125F AMNT PAIN NOTED PAIN PRSNT: CPT | Mod: S$GLB,,, | Performed by: PODIATRIST

## 2021-02-01 PROCEDURE — 99999 PR PBB SHADOW E&M-EST. PATIENT-LVL IV: CPT | Mod: PBBFAC,,, | Performed by: SURGERY

## 2021-02-01 PROCEDURE — 99203 PR OFFICE/OUTPT VISIT, NEW, LEVL III, 30-44 MIN: ICD-10-PCS | Mod: S$GLB,,, | Performed by: PODIATRIST

## 2021-02-01 PROCEDURE — 1125F PR PAIN SEVERITY QUANTIFIED, PAIN PRESENT: ICD-10-PCS | Mod: S$GLB,,, | Performed by: PODIATRIST

## 2021-02-01 PROCEDURE — 3078F PR MOST RECENT DIASTOLIC BLOOD PRESSURE < 80 MM HG: ICD-10-PCS | Mod: CPTII,S$GLB,, | Performed by: SURGERY

## 2021-02-01 PROCEDURE — 99999 PR PBB SHADOW E&M-EST. PATIENT-LVL IV: CPT | Mod: PBBFAC,,, | Performed by: PODIATRIST

## 2021-02-01 PROCEDURE — 3074F SYST BP LT 130 MM HG: CPT | Mod: CPTII,S$GLB,, | Performed by: SURGERY

## 2021-02-01 PROCEDURE — 3074F PR MOST RECENT SYSTOLIC BLOOD PRESSURE < 130 MM HG: ICD-10-PCS | Mod: CPTII,S$GLB,, | Performed by: SURGERY

## 2021-02-01 PROCEDURE — 99203 OFFICE O/P NEW LOW 30 MIN: CPT | Mod: S$GLB,,, | Performed by: PODIATRIST

## 2021-02-01 PROCEDURE — 99204 OFFICE O/P NEW MOD 45 MIN: CPT | Mod: S$GLB,,, | Performed by: SURGERY

## 2021-02-01 PROCEDURE — 99999 PR PBB SHADOW E&M-EST. PATIENT-LVL IV: ICD-10-PCS | Mod: PBBFAC,,, | Performed by: PODIATRIST

## 2021-02-01 PROCEDURE — 99204 PR OFFICE/OUTPT VISIT, NEW, LEVL IV, 45-59 MIN: ICD-10-PCS | Mod: S$GLB,,, | Performed by: SURGERY

## 2021-02-01 PROCEDURE — 3079F PR MOST RECENT DIASTOLIC BLOOD PRESSURE 80-89 MM HG: ICD-10-PCS | Mod: CPTII,S$GLB,, | Performed by: PODIATRIST

## 2021-02-01 PROCEDURE — 1126F AMNT PAIN NOTED NONE PRSNT: CPT | Mod: S$GLB,,, | Performed by: SURGERY

## 2021-02-01 PROCEDURE — 3008F PR BODY MASS INDEX (BMI) DOCUMENTED: ICD-10-PCS | Mod: CPTII,S$GLB,, | Performed by: SURGERY

## 2021-02-01 PROCEDURE — 1126F PR PAIN SEVERITY QUANTIFIED, NO PAIN PRESENT: ICD-10-PCS | Mod: S$GLB,,, | Performed by: SURGERY

## 2021-02-01 PROCEDURE — 3079F DIAST BP 80-89 MM HG: CPT | Mod: CPTII,S$GLB,, | Performed by: PODIATRIST

## 2021-02-01 PROCEDURE — 3008F PR BODY MASS INDEX (BMI) DOCUMENTED: ICD-10-PCS | Mod: CPTII,S$GLB,, | Performed by: PODIATRIST

## 2021-02-01 PROCEDURE — 3075F SYST BP GE 130 - 139MM HG: CPT | Mod: CPTII,S$GLB,, | Performed by: PODIATRIST

## 2021-02-01 PROCEDURE — 3008F BODY MASS INDEX DOCD: CPT | Mod: CPTII,S$GLB,, | Performed by: SURGERY

## 2021-02-01 PROCEDURE — 3008F BODY MASS INDEX DOCD: CPT | Mod: CPTII,S$GLB,, | Performed by: PODIATRIST

## 2021-02-04 ENCOUNTER — OFFICE VISIT (OUTPATIENT)
Dept: PHYSICAL MEDICINE AND REHAB | Facility: CLINIC | Age: 51
End: 2021-02-04
Payer: COMMERCIAL

## 2021-02-04 VITALS
DIASTOLIC BLOOD PRESSURE: 74 MMHG | BODY MASS INDEX: 38.8 KG/M2 | SYSTOLIC BLOOD PRESSURE: 121 MMHG | WEIGHT: 262 LBS | RESPIRATION RATE: 13 BRPM | HEART RATE: 69 BPM | HEIGHT: 69 IN

## 2021-02-04 DIAGNOSIS — G56.03 BILATERAL CARPAL TUNNEL SYNDROME: Primary | ICD-10-CM

## 2021-02-04 DIAGNOSIS — Z12.11 SPECIAL SCREENING FOR MALIGNANT NEOPLASM OF COLON: Primary | ICD-10-CM

## 2021-02-04 PROCEDURE — 3074F PR MOST RECENT SYSTOLIC BLOOD PRESSURE < 130 MM HG: ICD-10-PCS | Mod: CPTII,S$GLB,, | Performed by: PHYSICAL MEDICINE & REHABILITATION

## 2021-02-04 PROCEDURE — 95912 PR NERVE CONDUCTION STUDY; 11 -12 STUDIES: ICD-10-PCS | Mod: S$GLB,,, | Performed by: PHYSICAL MEDICINE & REHABILITATION

## 2021-02-04 PROCEDURE — 3078F PR MOST RECENT DIASTOLIC BLOOD PRESSURE < 80 MM HG: ICD-10-PCS | Mod: CPTII,S$GLB,, | Performed by: PHYSICAL MEDICINE & REHABILITATION

## 2021-02-04 PROCEDURE — 99999 PR PBB SHADOW E&M-EST. PATIENT-LVL III: CPT | Mod: PBBFAC,,, | Performed by: PHYSICAL MEDICINE & REHABILITATION

## 2021-02-04 PROCEDURE — 1125F PR PAIN SEVERITY QUANTIFIED, PAIN PRESENT: ICD-10-PCS | Mod: S$GLB,,, | Performed by: PHYSICAL MEDICINE & REHABILITATION

## 2021-02-04 PROCEDURE — 99999 PR PBB SHADOW E&M-EST. PATIENT-LVL III: ICD-10-PCS | Mod: PBBFAC,,, | Performed by: PHYSICAL MEDICINE & REHABILITATION

## 2021-02-04 PROCEDURE — 3008F BODY MASS INDEX DOCD: CPT | Mod: CPTII,S$GLB,, | Performed by: PHYSICAL MEDICINE & REHABILITATION

## 2021-02-04 PROCEDURE — 99204 PR OFFICE/OUTPT VISIT, NEW, LEVL IV, 45-59 MIN: ICD-10-PCS | Mod: 25,S$GLB,, | Performed by: PHYSICAL MEDICINE & REHABILITATION

## 2021-02-04 PROCEDURE — 95912 NRV CNDJ TEST 11-12 STUDIES: CPT | Mod: S$GLB,,, | Performed by: PHYSICAL MEDICINE & REHABILITATION

## 2021-02-04 PROCEDURE — 1125F AMNT PAIN NOTED PAIN PRSNT: CPT | Mod: S$GLB,,, | Performed by: PHYSICAL MEDICINE & REHABILITATION

## 2021-02-04 PROCEDURE — 3074F SYST BP LT 130 MM HG: CPT | Mod: CPTII,S$GLB,, | Performed by: PHYSICAL MEDICINE & REHABILITATION

## 2021-02-04 PROCEDURE — 99204 OFFICE O/P NEW MOD 45 MIN: CPT | Mod: 25,S$GLB,, | Performed by: PHYSICAL MEDICINE & REHABILITATION

## 2021-02-04 PROCEDURE — 3078F DIAST BP <80 MM HG: CPT | Mod: CPTII,S$GLB,, | Performed by: PHYSICAL MEDICINE & REHABILITATION

## 2021-02-04 PROCEDURE — 3008F PR BODY MASS INDEX (BMI) DOCUMENTED: ICD-10-PCS | Mod: CPTII,S$GLB,, | Performed by: PHYSICAL MEDICINE & REHABILITATION

## 2021-02-12 ENCOUNTER — PATIENT MESSAGE (OUTPATIENT)
Dept: PODIATRY | Facility: CLINIC | Age: 51
End: 2021-02-12

## 2021-02-19 ENCOUNTER — OFFICE VISIT (OUTPATIENT)
Dept: PODIATRY | Facility: CLINIC | Age: 51
End: 2021-02-19
Payer: COMMERCIAL

## 2021-02-19 VITALS
HEART RATE: 83 BPM | DIASTOLIC BLOOD PRESSURE: 84 MMHG | HEIGHT: 69 IN | BODY MASS INDEX: 38.8 KG/M2 | SYSTOLIC BLOOD PRESSURE: 131 MMHG | WEIGHT: 261.94 LBS

## 2021-02-19 DIAGNOSIS — B35.1 DERMATOPHYTOSIS OF NAIL: ICD-10-CM

## 2021-02-19 DIAGNOSIS — E29.1 HYPOGONADISM IN MALE: ICD-10-CM

## 2021-02-19 DIAGNOSIS — Z12.5 PROSTATE CANCER SCREENING: ICD-10-CM

## 2021-02-19 DIAGNOSIS — N52.9 ERECTILE DYSFUNCTION, UNSPECIFIED ERECTILE DYSFUNCTION TYPE: ICD-10-CM

## 2021-02-19 DIAGNOSIS — I10 HYPERTENSION, UNSPECIFIED TYPE: ICD-10-CM

## 2021-02-19 DIAGNOSIS — D35.2 PROLACTINOMA: ICD-10-CM

## 2021-02-19 DIAGNOSIS — D35.2 BENIGN NEOPLASM OF PITUITARY GLAND AND CRANIOPHARYNGEAL DUCT (POUCH): ICD-10-CM

## 2021-02-19 DIAGNOSIS — L60.0 INGROWN TOENAIL OF RIGHT FOOT: Primary | ICD-10-CM

## 2021-02-19 DIAGNOSIS — D35.3 BENIGN NEOPLASM OF PITUITARY GLAND AND CRANIOPHARYNGEAL DUCT (POUCH): ICD-10-CM

## 2021-02-19 PROCEDURE — 3075F SYST BP GE 130 - 139MM HG: CPT | Mod: CPTII,S$GLB,, | Performed by: PODIATRIST

## 2021-02-19 PROCEDURE — 11750 PR REMOVAL OF NAIL BED: ICD-10-PCS | Mod: T9,S$GLB,, | Performed by: PODIATRIST

## 2021-02-19 PROCEDURE — 99999 PR PBB SHADOW E&M-EST. PATIENT-LVL III: ICD-10-PCS | Mod: PBBFAC,,, | Performed by: PODIATRIST

## 2021-02-19 PROCEDURE — 99999 PR PBB SHADOW E&M-EST. PATIENT-LVL III: CPT | Mod: PBBFAC,,, | Performed by: PODIATRIST

## 2021-02-19 PROCEDURE — 99214 OFFICE O/P EST MOD 30 MIN: CPT | Mod: 25,S$GLB,, | Performed by: PODIATRIST

## 2021-02-19 PROCEDURE — 3079F DIAST BP 80-89 MM HG: CPT | Mod: CPTII,S$GLB,, | Performed by: PODIATRIST

## 2021-02-19 PROCEDURE — 99214 PR OFFICE/OUTPT VISIT, EST, LEVL IV, 30-39 MIN: ICD-10-PCS | Mod: 25,S$GLB,, | Performed by: PODIATRIST

## 2021-02-19 PROCEDURE — 3075F PR MOST RECENT SYSTOLIC BLOOD PRESS GE 130-139MM HG: ICD-10-PCS | Mod: CPTII,S$GLB,, | Performed by: PODIATRIST

## 2021-02-19 PROCEDURE — 3008F PR BODY MASS INDEX (BMI) DOCUMENTED: ICD-10-PCS | Mod: CPTII,S$GLB,, | Performed by: PODIATRIST

## 2021-02-19 PROCEDURE — 11750 EXCISION NAIL&NAIL MATRIX: CPT | Mod: T9,S$GLB,, | Performed by: PODIATRIST

## 2021-02-19 PROCEDURE — 3079F PR MOST RECENT DIASTOLIC BLOOD PRESSURE 80-89 MM HG: ICD-10-PCS | Mod: CPTII,S$GLB,, | Performed by: PODIATRIST

## 2021-02-19 PROCEDURE — 3008F BODY MASS INDEX DOCD: CPT | Mod: CPTII,S$GLB,, | Performed by: PODIATRIST

## 2021-02-22 RX ORDER — CABERGOLINE 0.5 MG/1
TABLET ORAL
Qty: 48 TABLET | Refills: 11 | Status: SHIPPED | OUTPATIENT
Start: 2021-02-22 | End: 2021-03-29 | Stop reason: SDUPTHER

## 2021-02-23 ENCOUNTER — TELEPHONE (OUTPATIENT)
Dept: PHARMACY | Facility: CLINIC | Age: 51
End: 2021-02-23

## 2021-02-23 RX ORDER — POTASSIUM CHLORIDE 750 MG/1
20 TABLET, EXTENDED RELEASE ORAL DAILY
Qty: 90 TABLET | Refills: 1 | Status: SHIPPED | OUTPATIENT
Start: 2021-02-23 | End: 2021-06-08 | Stop reason: SDUPTHER

## 2021-03-04 ENCOUNTER — OFFICE VISIT (OUTPATIENT)
Dept: PODIATRY | Facility: CLINIC | Age: 51
End: 2021-03-04
Payer: COMMERCIAL

## 2021-03-04 VITALS — HEART RATE: 81 BPM | SYSTOLIC BLOOD PRESSURE: 157 MMHG | DIASTOLIC BLOOD PRESSURE: 85 MMHG

## 2021-03-04 DIAGNOSIS — L60.0 INGROWN TOENAIL OF RIGHT FOOT: Primary | ICD-10-CM

## 2021-03-04 DIAGNOSIS — B35.1 DERMATOPHYTOSIS OF NAIL: ICD-10-CM

## 2021-03-04 PROCEDURE — 99213 OFFICE O/P EST LOW 20 MIN: CPT | Mod: S$GLB,,, | Performed by: PODIATRIST

## 2021-03-04 PROCEDURE — 3077F SYST BP >= 140 MM HG: CPT | Mod: CPTII,S$GLB,, | Performed by: PODIATRIST

## 2021-03-04 PROCEDURE — 3079F DIAST BP 80-89 MM HG: CPT | Mod: CPTII,S$GLB,, | Performed by: PODIATRIST

## 2021-03-04 PROCEDURE — 3077F PR MOST RECENT SYSTOLIC BLOOD PRESSURE >= 140 MM HG: ICD-10-PCS | Mod: CPTII,S$GLB,, | Performed by: PODIATRIST

## 2021-03-04 PROCEDURE — 99999 PR PBB SHADOW E&M-EST. PATIENT-LVL III: ICD-10-PCS | Mod: PBBFAC,,, | Performed by: PODIATRIST

## 2021-03-04 PROCEDURE — 99999 PR PBB SHADOW E&M-EST. PATIENT-LVL III: CPT | Mod: PBBFAC,,, | Performed by: PODIATRIST

## 2021-03-04 PROCEDURE — 3079F PR MOST RECENT DIASTOLIC BLOOD PRESSURE 80-89 MM HG: ICD-10-PCS | Mod: CPTII,S$GLB,, | Performed by: PODIATRIST

## 2021-03-04 PROCEDURE — 99213 PR OFFICE/OUTPT VISIT, EST, LEVL III, 20-29 MIN: ICD-10-PCS | Mod: S$GLB,,, | Performed by: PODIATRIST

## 2021-03-25 DIAGNOSIS — I10 HYPERTENSION, UNSPECIFIED TYPE: ICD-10-CM

## 2021-03-25 DIAGNOSIS — D35.2 PROLACTINOMA: ICD-10-CM

## 2021-03-25 DIAGNOSIS — D35.3 BENIGN NEOPLASM OF PITUITARY GLAND AND CRANIOPHARYNGEAL DUCT (POUCH): ICD-10-CM

## 2021-03-25 DIAGNOSIS — Z12.5 PROSTATE CANCER SCREENING: ICD-10-CM

## 2021-03-25 DIAGNOSIS — E29.1 HYPOGONADISM IN MALE: ICD-10-CM

## 2021-03-25 DIAGNOSIS — D35.2 BENIGN NEOPLASM OF PITUITARY GLAND AND CRANIOPHARYNGEAL DUCT (POUCH): ICD-10-CM

## 2021-03-25 DIAGNOSIS — N52.9 ERECTILE DYSFUNCTION, UNSPECIFIED ERECTILE DYSFUNCTION TYPE: ICD-10-CM

## 2021-03-29 ENCOUNTER — OFFICE VISIT (OUTPATIENT)
Dept: UROLOGY | Facility: CLINIC | Age: 51
End: 2021-03-29
Payer: COMMERCIAL

## 2021-03-29 VITALS
DIASTOLIC BLOOD PRESSURE: 80 MMHG | BODY MASS INDEX: 38.87 KG/M2 | SYSTOLIC BLOOD PRESSURE: 130 MMHG | WEIGHT: 263.25 LBS

## 2021-03-29 DIAGNOSIS — Z12.5 PROSTATE CANCER SCREENING: ICD-10-CM

## 2021-03-29 DIAGNOSIS — D35.2 PROLACTINOMA: Primary | ICD-10-CM

## 2021-03-29 DIAGNOSIS — D35.2 BENIGN NEOPLASM OF PITUITARY GLAND AND CRANIOPHARYNGEAL DUCT (POUCH): ICD-10-CM

## 2021-03-29 DIAGNOSIS — N52.9 ERECTILE DYSFUNCTION, UNSPECIFIED ERECTILE DYSFUNCTION TYPE: ICD-10-CM

## 2021-03-29 DIAGNOSIS — E29.1 HYPOGONADISM IN MALE: ICD-10-CM

## 2021-03-29 DIAGNOSIS — D35.3 BENIGN NEOPLASM OF PITUITARY GLAND AND CRANIOPHARYNGEAL DUCT (POUCH): ICD-10-CM

## 2021-03-29 DIAGNOSIS — I10 HYPERTENSION, UNSPECIFIED TYPE: ICD-10-CM

## 2021-03-29 PROCEDURE — 3008F PR BODY MASS INDEX (BMI) DOCUMENTED: ICD-10-PCS | Mod: CPTII,S$GLB,, | Performed by: UROLOGY

## 2021-03-29 PROCEDURE — 3008F BODY MASS INDEX DOCD: CPT | Mod: CPTII,S$GLB,, | Performed by: UROLOGY

## 2021-03-29 PROCEDURE — 3075F PR MOST RECENT SYSTOLIC BLOOD PRESS GE 130-139MM HG: ICD-10-PCS | Mod: CPTII,S$GLB,, | Performed by: UROLOGY

## 2021-03-29 PROCEDURE — 99214 PR OFFICE/OUTPT VISIT, EST, LEVL IV, 30-39 MIN: ICD-10-PCS | Mod: S$GLB,,, | Performed by: UROLOGY

## 2021-03-29 PROCEDURE — 3079F PR MOST RECENT DIASTOLIC BLOOD PRESSURE 80-89 MM HG: ICD-10-PCS | Mod: CPTII,S$GLB,, | Performed by: UROLOGY

## 2021-03-29 PROCEDURE — 99999 PR PBB SHADOW E&M-EST. PATIENT-LVL II: CPT | Mod: PBBFAC,,, | Performed by: UROLOGY

## 2021-03-29 PROCEDURE — 3079F DIAST BP 80-89 MM HG: CPT | Mod: CPTII,S$GLB,, | Performed by: UROLOGY

## 2021-03-29 PROCEDURE — 3075F SYST BP GE 130 - 139MM HG: CPT | Mod: CPTII,S$GLB,, | Performed by: UROLOGY

## 2021-03-29 PROCEDURE — 99999 PR PBB SHADOW E&M-EST. PATIENT-LVL II: ICD-10-PCS | Mod: PBBFAC,,, | Performed by: UROLOGY

## 2021-03-29 PROCEDURE — 99214 OFFICE O/P EST MOD 30 MIN: CPT | Mod: S$GLB,,, | Performed by: UROLOGY

## 2021-03-29 RX ORDER — SILDENAFIL CITRATE 20 MG/1
TABLET ORAL
Qty: 50 TABLET | Refills: 11 | Status: SHIPPED | OUTPATIENT
Start: 2021-03-29 | End: 2022-07-20 | Stop reason: SDUPTHER

## 2021-03-29 RX ORDER — TESTOSTERONE CYPIONATE 200 MG/ML
INJECTION, SOLUTION INTRAMUSCULAR
Qty: 10 ML | Refills: 1 | OUTPATIENT
Start: 2021-03-29

## 2021-03-29 RX ORDER — TESTOSTERONE CYPIONATE 200 MG/ML
INJECTION, SOLUTION INTRAMUSCULAR
Qty: 10 ML | Refills: 1 | Status: SHIPPED | OUTPATIENT
Start: 2021-03-29 | End: 2022-04-11 | Stop reason: SDUPTHER

## 2021-03-29 RX ORDER — CABERGOLINE 0.5 MG/1
TABLET ORAL
Qty: 48 TABLET | Refills: 11 | Status: SHIPPED | OUTPATIENT
Start: 2021-03-29 | End: 2022-04-11 | Stop reason: SDUPTHER

## 2021-06-08 RX ORDER — POTASSIUM CHLORIDE 750 MG/1
20 TABLET, EXTENDED RELEASE ORAL DAILY
Qty: 90 TABLET | Refills: 1 | Status: SHIPPED | OUTPATIENT
Start: 2021-06-08 | End: 2021-09-17 | Stop reason: SDUPTHER

## 2021-07-10 ENCOUNTER — PATIENT MESSAGE (OUTPATIENT)
Dept: ENDOSCOPY | Facility: HOSPITAL | Age: 51
End: 2021-07-10

## 2021-07-12 RX ORDER — POLYETHYLENE GLYCOL 3350, SODIUM SULFATE ANHYDROUS, SODIUM BICARBONATE, SODIUM CHLORIDE, POTASSIUM CHLORIDE 236; 22.74; 6.74; 5.86; 2.97 G/4L; G/4L; G/4L; G/4L; G/4L
4 POWDER, FOR SOLUTION ORAL ONCE
Qty: 4000 ML | Refills: 0 | Status: SHIPPED | OUTPATIENT
Start: 2021-07-12 | End: 2021-12-08

## 2021-07-19 DIAGNOSIS — R42 VERTIGO: Primary | ICD-10-CM

## 2021-07-19 RX ORDER — SCOLOPAMINE TRANSDERMAL SYSTEM 1 MG/1
1 PATCH, EXTENDED RELEASE TRANSDERMAL
Qty: 2 PATCH | Refills: 0 | Status: SHIPPED | OUTPATIENT
Start: 2021-07-19 | End: 2022-01-24

## 2021-08-19 RX ORDER — HYDRALAZINE HYDROCHLORIDE 25 MG/1
25 TABLET, FILM COATED ORAL 3 TIMES DAILY
Qty: 90 TABLET | Refills: 1 | Status: SHIPPED | OUTPATIENT
Start: 2021-08-19 | End: 2022-02-22 | Stop reason: SDUPTHER

## 2021-08-19 RX ORDER — HYDROCHLOROTHIAZIDE 25 MG/1
25 TABLET ORAL DAILY
Qty: 30 TABLET | Refills: 5 | Status: SHIPPED | OUTPATIENT
Start: 2021-08-19 | End: 2022-03-09 | Stop reason: SDUPTHER

## 2021-09-17 RX ORDER — POTASSIUM CHLORIDE 750 MG/1
20 TABLET, EXTENDED RELEASE ORAL DAILY
Qty: 90 TABLET | Refills: 1 | Status: SHIPPED | OUTPATIENT
Start: 2021-09-17 | End: 2021-12-23 | Stop reason: SDUPTHER

## 2021-09-22 ENCOUNTER — TELEPHONE (OUTPATIENT)
Dept: PREADMISSION TESTING | Facility: HOSPITAL | Age: 51
End: 2021-09-22

## 2021-09-22 DIAGNOSIS — Z01.818 PRE-OP TESTING: ICD-10-CM

## 2021-10-03 ENCOUNTER — PATIENT OUTREACH (OUTPATIENT)
Dept: ADMINISTRATIVE | Facility: OTHER | Age: 51
End: 2021-10-03

## 2021-12-07 ENCOUNTER — TELEPHONE (OUTPATIENT)
Dept: GASTROENTEROLOGY | Facility: CLINIC | Age: 51
End: 2021-12-07
Payer: COMMERCIAL

## 2021-12-08 ENCOUNTER — ANESTHESIA EVENT (OUTPATIENT)
Dept: ENDOSCOPY | Facility: HOSPITAL | Age: 51
End: 2021-12-08
Payer: COMMERCIAL

## 2021-12-08 ENCOUNTER — TELEPHONE (OUTPATIENT)
Dept: PREADMISSION TESTING | Facility: HOSPITAL | Age: 51
End: 2021-12-08
Payer: COMMERCIAL

## 2021-12-09 ENCOUNTER — TELEPHONE (OUTPATIENT)
Dept: INTERNAL MEDICINE | Facility: CLINIC | Age: 51
End: 2021-12-09
Payer: COMMERCIAL

## 2021-12-09 DIAGNOSIS — R73.02 IGT (IMPAIRED GLUCOSE TOLERANCE): Primary | ICD-10-CM

## 2021-12-09 DIAGNOSIS — Z13.6 ENCOUNTER FOR LIPID SCREENING FOR CARDIOVASCULAR DISEASE: ICD-10-CM

## 2021-12-09 DIAGNOSIS — Z12.5 PROSTATE CANCER SCREENING: ICD-10-CM

## 2021-12-09 DIAGNOSIS — Z13.220 ENCOUNTER FOR LIPID SCREENING FOR CARDIOVASCULAR DISEASE: ICD-10-CM

## 2021-12-10 ENCOUNTER — TELEPHONE (OUTPATIENT)
Dept: INTERNAL MEDICINE | Facility: CLINIC | Age: 51
End: 2021-12-10
Payer: COMMERCIAL

## 2021-12-10 ENCOUNTER — HOSPITAL ENCOUNTER (OUTPATIENT)
Facility: HOSPITAL | Age: 51
Discharge: HOME | End: 2021-12-10
Attending: INTERNAL MEDICINE | Admitting: INTERNAL MEDICINE
Payer: COMMERCIAL

## 2021-12-10 ENCOUNTER — ANESTHESIA (OUTPATIENT)
Dept: ENDOSCOPY | Facility: HOSPITAL | Age: 51
End: 2021-12-10
Payer: COMMERCIAL

## 2021-12-10 DIAGNOSIS — I16.0 HYPERTENSIVE URGENCY: ICD-10-CM

## 2021-12-10 DIAGNOSIS — Z12.11 ENCOUNTER FOR SCREENING COLONOSCOPY: Primary | ICD-10-CM

## 2021-12-10 PROCEDURE — D9220A PRA ANESTHESIA: ICD-10-PCS | Mod: 33,ANES,, | Performed by: ANESTHESIOLOGY

## 2021-12-10 PROCEDURE — 88305 TISSUE EXAM BY PATHOLOGIST: CPT | Performed by: STUDENT IN AN ORGANIZED HEALTH CARE EDUCATION/TRAINING PROGRAM

## 2021-12-10 PROCEDURE — D9220A PRA ANESTHESIA: ICD-10-PCS | Mod: 33,CRNA,, | Performed by: NURSE ANESTHETIST, CERTIFIED REGISTERED

## 2021-12-10 PROCEDURE — 45380 PR COLONOSCOPY,BIOPSY: ICD-10-PCS | Mod: 33,,, | Performed by: INTERNAL MEDICINE

## 2021-12-10 PROCEDURE — 63600175 PHARM REV CODE 636 W HCPCS: Performed by: NURSE ANESTHETIST, CERTIFIED REGISTERED

## 2021-12-10 PROCEDURE — 63600175 PHARM REV CODE 636 W HCPCS: Performed by: INTERNAL MEDICINE

## 2021-12-10 PROCEDURE — 37000008 HC ANESTHESIA 1ST 15 MINUTES: Performed by: INTERNAL MEDICINE

## 2021-12-10 PROCEDURE — 37000009 HC ANESTHESIA EA ADD 15 MINS: Performed by: INTERNAL MEDICINE

## 2021-12-10 PROCEDURE — 88305 TISSUE EXAM BY PATHOLOGIST: ICD-10-PCS | Mod: 26,,, | Performed by: STUDENT IN AN ORGANIZED HEALTH CARE EDUCATION/TRAINING PROGRAM

## 2021-12-10 PROCEDURE — D9220A PRA ANESTHESIA: Mod: 33,ANES,, | Performed by: ANESTHESIOLOGY

## 2021-12-10 PROCEDURE — D9220A PRA ANESTHESIA: Mod: 33,CRNA,, | Performed by: NURSE ANESTHETIST, CERTIFIED REGISTERED

## 2021-12-10 PROCEDURE — 25000003 PHARM REV CODE 250: Performed by: NURSE ANESTHETIST, CERTIFIED REGISTERED

## 2021-12-10 PROCEDURE — 45380 COLONOSCOPY AND BIOPSY: CPT | Mod: 33,,, | Performed by: INTERNAL MEDICINE

## 2021-12-10 PROCEDURE — 88305 TISSUE EXAM BY PATHOLOGIST: CPT | Mod: 26,,, | Performed by: STUDENT IN AN ORGANIZED HEALTH CARE EDUCATION/TRAINING PROGRAM

## 2021-12-10 PROCEDURE — 27201012 HC FORCEPS, HOT/COLD, DISP: Performed by: INTERNAL MEDICINE

## 2021-12-10 PROCEDURE — 45380 COLONOSCOPY AND BIOPSY: CPT | Performed by: INTERNAL MEDICINE

## 2021-12-10 RX ORDER — LIDOCAINE HYDROCHLORIDE 20 MG/ML
INJECTION, SOLUTION EPIDURAL; INFILTRATION; INTRACAUDAL; PERINEURAL
Status: DISCONTINUED | OUTPATIENT
Start: 2021-12-10 | End: 2021-12-10

## 2021-12-10 RX ORDER — SODIUM CHLORIDE 0.9 % (FLUSH) 0.9 %
10 SYRINGE (ML) INJECTION
Status: DISCONTINUED | OUTPATIENT
Start: 2021-12-10 | End: 2021-12-10 | Stop reason: HOSPADM

## 2021-12-10 RX ORDER — PROPOFOL 10 MG/ML
VIAL (ML) INTRAVENOUS
Status: DISCONTINUED | OUTPATIENT
Start: 2021-12-10 | End: 2021-12-10

## 2021-12-10 RX ORDER — SODIUM CHLORIDE, SODIUM LACTATE, POTASSIUM CHLORIDE, CALCIUM CHLORIDE 600; 310; 30; 20 MG/100ML; MG/100ML; MG/100ML; MG/100ML
INJECTION, SOLUTION INTRAVENOUS CONTINUOUS
Status: ACTIVE | OUTPATIENT
Start: 2021-12-10

## 2021-12-10 RX ADMIN — PROPOFOL 20 MG: 10 INJECTION, EMULSION INTRAVENOUS at 08:12

## 2021-12-10 RX ADMIN — PROPOFOL 50 MG: 10 INJECTION, EMULSION INTRAVENOUS at 08:12

## 2021-12-10 RX ADMIN — SODIUM CHLORIDE, SODIUM LACTATE, POTASSIUM CHLORIDE, AND CALCIUM CHLORIDE: 600; 310; 30; 20 INJECTION, SOLUTION INTRAVENOUS at 08:12

## 2021-12-10 RX ADMIN — PROPOFOL 100 MG: 10 INJECTION, EMULSION INTRAVENOUS at 08:12

## 2021-12-10 RX ADMIN — PROPOFOL 10 MG: 10 INJECTION, EMULSION INTRAVENOUS at 08:12

## 2021-12-10 RX ADMIN — PROPOFOL 30 MG: 10 INJECTION, EMULSION INTRAVENOUS at 08:12

## 2021-12-10 RX ADMIN — LIDOCAINE HYDROCHLORIDE 30 MG: 20 INJECTION, SOLUTION EPIDURAL; INFILTRATION; INTRACAUDAL; PERINEURAL at 08:12

## 2021-12-14 LAB
FINAL PATHOLOGIC DIAGNOSIS: NORMAL
GROSS: NORMAL
Lab: NORMAL

## 2021-12-21 VITALS
HEIGHT: 69 IN | BODY MASS INDEX: 39.34 KG/M2 | DIASTOLIC BLOOD PRESSURE: 90 MMHG | SYSTOLIC BLOOD PRESSURE: 157 MMHG | RESPIRATION RATE: 18 BRPM | WEIGHT: 265.63 LBS | OXYGEN SATURATION: 97 % | HEART RATE: 66 BPM | TEMPERATURE: 98 F

## 2021-12-23 RX ORDER — POTASSIUM CHLORIDE 750 MG/1
20 TABLET, EXTENDED RELEASE ORAL DAILY
Qty: 180 TABLET | Refills: 3 | Status: SHIPPED | OUTPATIENT
Start: 2021-12-23 | End: 2023-01-11 | Stop reason: SDUPTHER

## 2022-01-19 ENCOUNTER — LAB VISIT (OUTPATIENT)
Dept: LAB | Facility: HOSPITAL | Age: 52
End: 2022-01-19
Attending: PEDIATRICS
Payer: COMMERCIAL

## 2022-01-19 DIAGNOSIS — Z12.5 PROSTATE CANCER SCREENING: ICD-10-CM

## 2022-01-19 DIAGNOSIS — Z13.6 ENCOUNTER FOR LIPID SCREENING FOR CARDIOVASCULAR DISEASE: ICD-10-CM

## 2022-01-19 DIAGNOSIS — R73.02 IGT (IMPAIRED GLUCOSE TOLERANCE): ICD-10-CM

## 2022-01-19 DIAGNOSIS — Z13.220 ENCOUNTER FOR LIPID SCREENING FOR CARDIOVASCULAR DISEASE: ICD-10-CM

## 2022-01-19 LAB
ALBUMIN SERPL BCP-MCNC: 3.7 G/DL (ref 3.5–5.2)
ALP SERPL-CCNC: 75 U/L (ref 55–135)
ALT SERPL W/O P-5'-P-CCNC: 28 U/L (ref 10–44)
ANION GAP SERPL CALC-SCNC: 11 MMOL/L (ref 8–16)
AST SERPL-CCNC: 27 U/L (ref 10–40)
BILIRUB SERPL-MCNC: 0.3 MG/DL (ref 0.1–1)
BUN SERPL-MCNC: 23 MG/DL (ref 6–20)
CALCIUM SERPL-MCNC: 9 MG/DL (ref 8.7–10.5)
CHLORIDE SERPL-SCNC: 104 MMOL/L (ref 95–110)
CHOLEST SERPL-MCNC: 171 MG/DL (ref 120–199)
CHOLEST/HDLC SERPL: 4.2 {RATIO} (ref 2–5)
CO2 SERPL-SCNC: 22 MMOL/L (ref 23–29)
COMPLEXED PSA SERPL-MCNC: 0.98 NG/ML (ref 0–4)
CREAT SERPL-MCNC: 1.3 MG/DL (ref 0.5–1.4)
EST. GFR  (AFRICAN AMERICAN): >60 ML/MIN/1.73 M^2
EST. GFR  (NON AFRICAN AMERICAN): >60 ML/MIN/1.73 M^2
ESTIMATED AVG GLUCOSE: 128 MG/DL (ref 68–131)
GLUCOSE SERPL-MCNC: 117 MG/DL (ref 70–110)
HBA1C MFR BLD: 6.1 % (ref 4–5.6)
HDLC SERPL-MCNC: 41 MG/DL (ref 40–75)
HDLC SERPL: 24 % (ref 20–50)
LDLC SERPL CALC-MCNC: 93.4 MG/DL (ref 63–159)
NONHDLC SERPL-MCNC: 130 MG/DL
POTASSIUM SERPL-SCNC: 4.1 MMOL/L (ref 3.5–5.1)
PROT SERPL-MCNC: 7 G/DL (ref 6–8.4)
SODIUM SERPL-SCNC: 137 MMOL/L (ref 136–145)
TRIGL SERPL-MCNC: 183 MG/DL (ref 30–150)

## 2022-01-19 PROCEDURE — 36415 COLL VENOUS BLD VENIPUNCTURE: CPT | Performed by: PEDIATRICS

## 2022-01-19 PROCEDURE — 83036 HEMOGLOBIN GLYCOSYLATED A1C: CPT | Performed by: PEDIATRICS

## 2022-01-19 PROCEDURE — 80053 COMPREHEN METABOLIC PANEL: CPT | Performed by: PEDIATRICS

## 2022-01-19 PROCEDURE — 84153 ASSAY OF PSA TOTAL: CPT | Performed by: PEDIATRICS

## 2022-01-19 PROCEDURE — 80061 LIPID PANEL: CPT | Performed by: PEDIATRICS

## 2022-01-20 ENCOUNTER — PATIENT MESSAGE (OUTPATIENT)
Dept: ADMINISTRATIVE | Facility: OTHER | Age: 52
End: 2022-01-20
Payer: COMMERCIAL

## 2022-01-24 ENCOUNTER — OFFICE VISIT (OUTPATIENT)
Dept: INTERNAL MEDICINE | Facility: CLINIC | Age: 52
End: 2022-01-24
Payer: COMMERCIAL

## 2022-01-24 ENCOUNTER — E-CONSULT (OUTPATIENT)
Dept: ENDOCRINOLOGY | Facility: CLINIC | Age: 52
End: 2022-01-24
Payer: COMMERCIAL

## 2022-01-24 VITALS
SYSTOLIC BLOOD PRESSURE: 116 MMHG | BODY MASS INDEX: 39.74 KG/M2 | TEMPERATURE: 98 F | WEIGHT: 268.31 LBS | HEIGHT: 69 IN | DIASTOLIC BLOOD PRESSURE: 82 MMHG | OXYGEN SATURATION: 97 % | HEART RATE: 99 BPM

## 2022-01-24 DIAGNOSIS — E29.1 MALE HYPOGONADISM: ICD-10-CM

## 2022-01-24 DIAGNOSIS — R73.02 IGT (IMPAIRED GLUCOSE TOLERANCE): ICD-10-CM

## 2022-01-24 DIAGNOSIS — G47.33 OSA (OBSTRUCTIVE SLEEP APNEA): ICD-10-CM

## 2022-01-24 DIAGNOSIS — I10 PRIMARY HYPERTENSION: Primary | ICD-10-CM

## 2022-01-24 DIAGNOSIS — Z12.5 PROSTATE CANCER SCREENING: ICD-10-CM

## 2022-01-24 DIAGNOSIS — D35.2 PROLACTINOMA: ICD-10-CM

## 2022-01-24 DIAGNOSIS — N40.0 BENIGN PROSTATIC HYPERPLASIA WITHOUT LOWER URINARY TRACT SYMPTOMS: Primary | ICD-10-CM

## 2022-01-24 DIAGNOSIS — I70.1 RENAL ARTERY STENOSIS: ICD-10-CM

## 2022-01-24 DIAGNOSIS — I10 PRIMARY HYPERTENSION: ICD-10-CM

## 2022-01-24 DIAGNOSIS — N18.30 STAGE 3 CHRONIC KIDNEY DISEASE, UNSPECIFIED WHETHER STAGE 3A OR 3B CKD: ICD-10-CM

## 2022-01-24 DIAGNOSIS — E66.9 CLASS 2 OBESITY WITH BODY MASS INDEX (BMI) OF 36.0 TO 36.9 IN ADULT, UNSPECIFIED OBESITY TYPE, UNSPECIFIED WHETHER SERIOUS COMORBIDITY PRESENT: ICD-10-CM

## 2022-01-24 PROCEDURE — 1160F RVW MEDS BY RX/DR IN RCRD: CPT | Mod: CPTII,S$GLB,, | Performed by: PEDIATRICS

## 2022-01-24 PROCEDURE — 99999 PR PBB SHADOW E&M-EST. PATIENT-LVL IV: ICD-10-PCS | Mod: PBBFAC,,, | Performed by: PEDIATRICS

## 2022-01-24 PROCEDURE — 3044F HG A1C LEVEL LT 7.0%: CPT | Mod: CPTII,S$GLB,, | Performed by: PEDIATRICS

## 2022-01-24 PROCEDURE — 99214 PR OFFICE/OUTPT VISIT, EST, LEVL IV, 30-39 MIN: ICD-10-PCS | Mod: S$GLB,,, | Performed by: PEDIATRICS

## 2022-01-24 PROCEDURE — 99999 PR PBB SHADOW E&M-EST. PATIENT-LVL IV: CPT | Mod: PBBFAC,,, | Performed by: PEDIATRICS

## 2022-01-24 PROCEDURE — 99214 OFFICE O/P EST MOD 30 MIN: CPT | Mod: S$GLB,,, | Performed by: PEDIATRICS

## 2022-01-24 PROCEDURE — 3079F DIAST BP 80-89 MM HG: CPT | Mod: CPTII,S$GLB,, | Performed by: PEDIATRICS

## 2022-01-24 PROCEDURE — 1159F MED LIST DOCD IN RCRD: CPT | Mod: CPTII,S$GLB,, | Performed by: PEDIATRICS

## 2022-01-24 PROCEDURE — 3008F PR BODY MASS INDEX (BMI) DOCUMENTED: ICD-10-PCS | Mod: CPTII,S$GLB,, | Performed by: PEDIATRICS

## 2022-01-24 PROCEDURE — 3074F SYST BP LT 130 MM HG: CPT | Mod: CPTII,S$GLB,, | Performed by: PEDIATRICS

## 2022-01-24 PROCEDURE — 3074F PR MOST RECENT SYSTOLIC BLOOD PRESSURE < 130 MM HG: ICD-10-PCS | Mod: CPTII,S$GLB,, | Performed by: PEDIATRICS

## 2022-01-24 PROCEDURE — 1159F PR MEDICATION LIST DOCUMENTED IN MEDICAL RECORD: ICD-10-PCS | Mod: CPTII,S$GLB,, | Performed by: PEDIATRICS

## 2022-01-24 PROCEDURE — 3079F PR MOST RECENT DIASTOLIC BLOOD PRESSURE 80-89 MM HG: ICD-10-PCS | Mod: CPTII,S$GLB,, | Performed by: PEDIATRICS

## 2022-01-24 PROCEDURE — 99451 PR INTERPROF, PHONE/INTERNET/EHR, CONSULT, >= 5MINS: ICD-10-PCS | Mod: S$GLB,,, | Performed by: INTERNAL MEDICINE

## 2022-01-24 PROCEDURE — 3044F PR MOST RECENT HEMOGLOBIN A1C LEVEL <7.0%: ICD-10-PCS | Mod: CPTII,S$GLB,, | Performed by: PEDIATRICS

## 2022-01-24 PROCEDURE — 3008F BODY MASS INDEX DOCD: CPT | Mod: CPTII,S$GLB,, | Performed by: PEDIATRICS

## 2022-01-24 PROCEDURE — 99451 NTRPROF PH1/NTRNET/EHR 5/>: CPT | Mod: S$GLB,,, | Performed by: INTERNAL MEDICINE

## 2022-01-24 PROCEDURE — 1160F PR REVIEW ALL MEDS BY PRESCRIBER/CLIN PHARMACIST DOCUMENTED: ICD-10-PCS | Mod: CPTII,S$GLB,, | Performed by: PEDIATRICS

## 2022-01-24 NOTE — PROGRESS NOTES
[facility]  Response for E-Consult     Patient Name: Grant Rodriguez  MRN: [id number]  Primary Care Provider: CHRISTINE Banks Jr, MD   Requesting Provider: NARENDRA Banks Jr., MD      Findings:  Patient last seen by me in 2013 for micro prolactinoma and hypogonadotropic hypogonadism.  At that time he was on cabergoline and intramuscular testosterone replacement therapy      In reviewing medication list it appears as if he is on cabergoline 0.5 mg 2 pills on Monday and Friday and 1 pill on Wednesday and 200 mg of testosterone every 3 weeks.    Last prolactin level was low at 0.8 from 01/21/2021    Last pituitary MRI was 2016 which showed no pituitary lesions.    Assessment and plan:   micro prolactinoma with hypogonadotropic hypogonadism on cabergoline in testosterone replacement therapy.  Since last pituitary imaging did not show any pituitary lesions and the prolactin level was low would try decreasing the dose of cabergoline by a half a pill a week every 4 weeks and following prolactin levels.  If prolactin levels stay normal with decreasing and stopping therapy, then no need to follow-up with endocrine.  If prolactin levels do become elevated with decreasing medication we would be happy to see him back to her discuss options.   Of course, we are happy to see him in clinic to do initial cabergoline reductions if you would prefer that.    I did not speak to the requesting provider verbally about this.     Total time of Consultation: 15 minute    Percentage of time spent on verbal/written discussion: 100%     Thank you for your consult.     MD John Mckinney - [facility]

## 2022-01-24 NOTE — PROGRESS NOTES
Subjective:       Patient ID: Grant Rodriguez is a 51 y.o. male.    Chief Complaint: Annual Exam    Grant Rodriguez is a 51 y.o. male who presents to clinic for a his annual    1) HTN: no at home BP checks. Not following D&E. asymptomatic  2) IGT: last glucose stable. asymptomatic  3) Obesity: not following D&E  4) Hypogonad/prolactinoma: sees urology, taking testosterone  5) CKD: sees renal  6) JILLIAN: noncompliant with CPAP  7) renal artery stenosis: followed by nephrology in past    LABS REVIEWED AND DISCUSSED WITH PATIENT: CMP, PSA, lipid panel, A1C    Review of Systems   Constitutional: Negative for activity change, appetite change, chills, diaphoresis, fatigue, fever and unexpected weight change.   HENT: Negative for nasal congestion, ear pain, mouth sores, nosebleeds, postnasal drip, rhinorrhea, sneezing and sore throat.    Eyes: Negative for photophobia, pain, discharge, redness and visual disturbance.   Respiratory: Negative for cough, chest tightness, shortness of breath, wheezing and stridor.    Cardiovascular: Negative for chest pain, palpitations and leg swelling.   Gastrointestinal: Negative for abdominal distention, blood in stool, constipation, diarrhea, nausea and vomiting.   Genitourinary: Negative for decreased urine volume, difficulty urinating, dysuria, flank pain, frequency, genital sores, hematuria and urgency.   Musculoskeletal: Negative for arthralgias, back pain, joint swelling, neck pain and neck stiffness.   Integumentary:  Negative for color change, pallor, rash and wound.   Neurological: Negative for dizziness, syncope, speech difficulty, weakness, light-headedness and headaches.   Hematological: Negative for adenopathy. Does not bruise/bleed easily.   Psychiatric/Behavioral: Negative for confusion, decreased concentration, dysphoric mood, hallucinations, sleep disturbance and suicidal ideas. The patient is not nervous/anxious.    All other systems reviewed and are negative.         Objective:      Physical Exam  Vitals and nursing note reviewed.   Constitutional:       General: He is not in acute distress.     Appearance: He is well-developed.   Neck:      Thyroid: No thyromegaly.      Vascular: No JVD.   Cardiovascular:      Rate and Rhythm: Normal rate and regular rhythm.      Heart sounds: Normal heart sounds. No murmur heard.      Pulmonary:      Effort: Pulmonary effort is normal. No respiratory distress.      Breath sounds: Normal breath sounds. No wheezing or rales.   Abdominal:      General: There is no distension.      Palpations: Abdomen is soft. There is no mass.      Tenderness: There is no abdominal tenderness. There is no guarding.   Musculoskeletal:      Right lower leg: No edema.      Left lower leg: No edema.   Lymphadenopathy:      Cervical: No cervical adenopathy.   Skin:     Capillary Refill: Capillary refill takes less than 2 seconds.      Findings: No rash.   Neurological:      General: No focal deficit present.      Mental Status: He is alert and oriented to person, place, and time.      Cranial Nerves: No cranial nerve deficit.      Coordination: Coordination normal.   Psychiatric:         Mood and Affect: Mood normal.         Behavior: Behavior normal.         Thought Content: Thought content normal.         Judgment: Judgment normal.         Assessment:       Problem List Items Addressed This Visit     Prolactinoma    Relevant Orders    TSH    CBC Auto Differential    Prolactin    IGT (impaired glucose tolerance)    Relevant Orders    Hemoglobin A1C    Comprehensive Metabolic Panel    Obesity    Male hypogonadism    BPH (benign prostatic hyperplasia) - Primary    JILLIAN (obstructive sleep apnea)    Renal artery stenosis    Stage 3 chronic kidney disease    Relevant Orders    Comprehensive Metabolic Panel    Primary hypertension      Other Visit Diagnoses     Prostate cancer screening        Relevant Orders    PSA, Screening          Plan:     Benign prostatic  hyperplasia without lower urinary tract symptoms    Primary hypertension    IGT (impaired glucose tolerance)  -     Hemoglobin A1C; Future; Expected date: 01/24/2022  -     Comprehensive Metabolic Panel; Future; Expected date: 01/24/2022    Male hypogonadism    Class 2 obesity with body mass index (BMI) of 36.0 to 36.9 in adult, unspecified obesity type, unspecified whether serious comorbidity present    JILLIAN (obstructive sleep apnea)    Renal artery stenosis    Stage 3 chronic kidney disease, unspecified whether stage 3a or 3b CKD  -     Comprehensive Metabolic Panel; Future; Expected date: 01/24/2022    Prolactinoma  -     TSH; Future; Expected date: 01/24/2022  -     CBC Auto Differential; Future; Expected date: 01/24/2022  -     Prolactin; Future; Expected date: 01/24/2022    Prostate cancer screening  -     PSA, Screening; Future; Expected date: 01/24/2022    I reviewed with patient. He had been seeing urology for depoT and urology was monitoring prolactin since endo not here. That urologist has left, I will telemed endo consult to review chart and make follow up recommendations for me to continue. O/W maintain meds and f/u q 6 months. D&E, weight loss for preDM.  Scribe Attestation:   I, Pavel Figueredo, am scribing for, and in the presence of, Dr. Ed Banks Jr. I performed the above scribed service and the documentation accurately describes the services I performed. I attest to the accuracy of the note.    I, Dr. Ed Banks Jr, reviewed documentation as scribed above. I personally performed the services described in this documentation.  I agree that the record reflects my personal performance and is accurate and complete. Ed Banks Jr., MD.  01/24/2022

## 2022-01-25 ENCOUNTER — TELEPHONE (OUTPATIENT)
Dept: INTERNAL MEDICINE | Facility: CLINIC | Age: 52
End: 2022-01-25
Payer: COMMERCIAL

## 2022-01-25 ENCOUNTER — TELEMEDICINE (OUTPATIENT)
Dept: INTERNAL MEDICINE | Facility: CLINIC | Age: 52
End: 2022-01-25
Payer: COMMERCIAL

## 2022-01-25 DIAGNOSIS — E22.1 HYPERPROLACTINEMIA: Primary | ICD-10-CM

## 2022-01-25 NOTE — PROGRESS NOTES
I spoke with Mr Jennifer, he has decided to drop to 1.5 mg pills on Monday, 1 pill Wednesday, 2 pills Friday for 1 month. Then I will get prolactin level with telemed f/u after. If prolactin level good, then drop carbagoline 1/2 pill/week for another month repeating process each month until he is successfully of or prolactin rises too high and then consult endo formally. Staff to set up prolactin level in 1 week with telemed following week.

## 2022-01-31 ENCOUNTER — PATIENT MESSAGE (OUTPATIENT)
Dept: INTERNAL MEDICINE | Facility: CLINIC | Age: 52
End: 2022-01-31

## 2022-02-02 ENCOUNTER — LAB VISIT (OUTPATIENT)
Dept: LAB | Facility: HOSPITAL | Age: 52
End: 2022-02-02
Attending: PEDIATRICS
Payer: COMMERCIAL

## 2022-02-02 DIAGNOSIS — E22.1 HYPERPROLACTINEMIA: ICD-10-CM

## 2022-02-02 LAB — PROLACTIN SERPL IA-MCNC: <0.8 NG/ML (ref 3.5–19.4)

## 2022-02-02 PROCEDURE — 36415 COLL VENOUS BLD VENIPUNCTURE: CPT | Performed by: PEDIATRICS

## 2022-02-02 PROCEDURE — 84146 ASSAY OF PROLACTIN: CPT | Performed by: PEDIATRICS

## 2022-02-07 RX ORDER — AMLODIPINE BESYLATE 10 MG/1
10 TABLET ORAL DAILY
Qty: 30 TABLET | Refills: 11 | Status: SHIPPED | OUTPATIENT
Start: 2022-02-07 | End: 2023-02-10 | Stop reason: SDUPTHER

## 2022-02-22 RX ORDER — HYDRALAZINE HYDROCHLORIDE 25 MG/1
25 TABLET, FILM COATED ORAL 3 TIMES DAILY
Qty: 90 TABLET | Refills: 1 | Status: SHIPPED | OUTPATIENT
Start: 2022-02-22 | End: 2022-09-12 | Stop reason: SDUPTHER

## 2022-03-09 RX ORDER — HYDROCHLOROTHIAZIDE 25 MG/1
25 TABLET ORAL DAILY
Qty: 30 TABLET | Refills: 5 | Status: SHIPPED | OUTPATIENT
Start: 2022-03-09 | End: 2022-10-03 | Stop reason: SDUPTHER

## 2022-04-11 ENCOUNTER — PATIENT MESSAGE (OUTPATIENT)
Dept: INTERNAL MEDICINE | Facility: CLINIC | Age: 52
End: 2022-04-11
Payer: COMMERCIAL

## 2022-04-11 DIAGNOSIS — E29.1 HYPOGONADISM IN MALE: ICD-10-CM

## 2022-04-11 DIAGNOSIS — N52.9 ERECTILE DYSFUNCTION, UNSPECIFIED ERECTILE DYSFUNCTION TYPE: ICD-10-CM

## 2022-04-11 DIAGNOSIS — Z12.5 PROSTATE CANCER SCREENING: ICD-10-CM

## 2022-04-11 DIAGNOSIS — D35.3 BENIGN NEOPLASM OF PITUITARY GLAND AND CRANIOPHARYNGEAL DUCT (POUCH): ICD-10-CM

## 2022-04-11 DIAGNOSIS — D35.2 PROLACTINOMA: ICD-10-CM

## 2022-04-11 DIAGNOSIS — I10 HYPERTENSION, UNSPECIFIED TYPE: ICD-10-CM

## 2022-04-11 DIAGNOSIS — D35.2 BENIGN NEOPLASM OF PITUITARY GLAND AND CRANIOPHARYNGEAL DUCT (POUCH): ICD-10-CM

## 2022-04-11 RX ORDER — CABERGOLINE 0.5 MG/1
TABLET ORAL
Qty: 48 TABLET | Refills: 11 | OUTPATIENT
Start: 2022-04-11

## 2022-04-11 NOTE — TELEPHONE ENCOUNTER
S/w pt's wife Charline in clinic requesting refills to be sent to Dr. Banks. Advised wife would send refill requests and let wife/pt know when we receive Dr's response. Wife lisa.    GUICHO 01/24/2022  NV 07/25/2022

## 2022-04-12 RX ORDER — CABERGOLINE 0.5 MG/1
TABLET ORAL
Qty: 48 TABLET | Refills: 11 | Status: SHIPPED | OUTPATIENT
Start: 2022-04-12 | End: 2023-09-11

## 2022-04-12 RX ORDER — TESTOSTERONE CYPIONATE 200 MG/ML
200 INJECTION, SOLUTION INTRAMUSCULAR
Qty: 10 ML | Refills: 5 | Status: SHIPPED | OUTPATIENT
Start: 2022-04-12 | End: 2022-12-12

## 2022-05-09 ENCOUNTER — PATIENT MESSAGE (OUTPATIENT)
Dept: SMOKING CESSATION | Facility: CLINIC | Age: 52
End: 2022-05-09
Payer: COMMERCIAL

## 2022-07-18 ENCOUNTER — LAB VISIT (OUTPATIENT)
Dept: LAB | Facility: HOSPITAL | Age: 52
End: 2022-07-18
Attending: PEDIATRICS
Payer: COMMERCIAL

## 2022-07-18 DIAGNOSIS — Z12.5 PROSTATE CANCER SCREENING: ICD-10-CM

## 2022-07-18 DIAGNOSIS — R73.02 IGT (IMPAIRED GLUCOSE TOLERANCE): ICD-10-CM

## 2022-07-18 DIAGNOSIS — N18.30 STAGE 3 CHRONIC KIDNEY DISEASE, UNSPECIFIED WHETHER STAGE 3A OR 3B CKD: ICD-10-CM

## 2022-07-18 DIAGNOSIS — D35.2 PROLACTINOMA: ICD-10-CM

## 2022-07-18 LAB
ALBUMIN SERPL BCP-MCNC: 3.5 G/DL (ref 3.5–5.2)
ALP SERPL-CCNC: 65 U/L (ref 55–135)
ALT SERPL W/O P-5'-P-CCNC: 23 U/L (ref 10–44)
ANION GAP SERPL CALC-SCNC: 12 MMOL/L (ref 8–16)
AST SERPL-CCNC: 16 U/L (ref 10–40)
BASOPHILS # BLD AUTO: 0.05 K/UL (ref 0–0.2)
BASOPHILS NFR BLD: 0.5 % (ref 0–1.9)
BILIRUB SERPL-MCNC: 0.4 MG/DL (ref 0.1–1)
BUN SERPL-MCNC: 17 MG/DL (ref 6–20)
CALCIUM SERPL-MCNC: 9.5 MG/DL (ref 8.7–10.5)
CHLORIDE SERPL-SCNC: 100 MMOL/L (ref 95–110)
CO2 SERPL-SCNC: 24 MMOL/L (ref 23–29)
COMPLEXED PSA SERPL-MCNC: 1.3 NG/ML (ref 0–4)
CREAT SERPL-MCNC: 1.5 MG/DL (ref 0.5–1.4)
DIFFERENTIAL METHOD: NORMAL
EOSINOPHIL # BLD AUTO: 0.5 K/UL (ref 0–0.5)
EOSINOPHIL NFR BLD: 4.8 % (ref 0–8)
ERYTHROCYTE [DISTWIDTH] IN BLOOD BY AUTOMATED COUNT: 14.4 % (ref 11.5–14.5)
EST. GFR  (AFRICAN AMERICAN): >60 ML/MIN/1.73 M^2
EST. GFR  (NON AFRICAN AMERICAN): 52.4 ML/MIN/1.73 M^2
ESTIMATED AVG GLUCOSE: 128 MG/DL (ref 68–131)
GLUCOSE SERPL-MCNC: 119 MG/DL (ref 70–110)
HBA1C MFR BLD: 6.1 % (ref 4–5.6)
HCT VFR BLD AUTO: 45.9 % (ref 40–54)
HGB BLD-MCNC: 15.4 G/DL (ref 14–18)
IMM GRANULOCYTES # BLD AUTO: 0.04 K/UL (ref 0–0.04)
IMM GRANULOCYTES NFR BLD AUTO: 0.4 % (ref 0–0.5)
LYMPHOCYTES # BLD AUTO: 2.5 K/UL (ref 1–4.8)
LYMPHOCYTES NFR BLD: 25.3 % (ref 18–48)
MCH RBC QN AUTO: 29.5 PG (ref 27–31)
MCHC RBC AUTO-ENTMCNC: 33.6 G/DL (ref 32–36)
MCV RBC AUTO: 88 FL (ref 82–98)
MONOCYTES # BLD AUTO: 0.7 K/UL (ref 0.3–1)
MONOCYTES NFR BLD: 6.6 % (ref 4–15)
NEUTROPHILS # BLD AUTO: 6.1 K/UL (ref 1.8–7.7)
NEUTROPHILS NFR BLD: 62.4 % (ref 38–73)
NRBC BLD-RTO: 0 /100 WBC
PLATELET # BLD AUTO: 254 K/UL (ref 150–450)
PMV BLD AUTO: 10.5 FL (ref 9.2–12.9)
POTASSIUM SERPL-SCNC: 3.4 MMOL/L (ref 3.5–5.1)
PROLACTIN SERPL IA-MCNC: 1 NG/ML (ref 3.5–19.4)
PROT SERPL-MCNC: 7.1 G/DL (ref 6–8.4)
RBC # BLD AUTO: 5.22 M/UL (ref 4.6–6.2)
SODIUM SERPL-SCNC: 136 MMOL/L (ref 136–145)
TSH SERPL DL<=0.005 MIU/L-ACNC: 3.57 UIU/ML (ref 0.4–4)
WBC # BLD AUTO: 9.79 K/UL (ref 3.9–12.7)

## 2022-07-18 PROCEDURE — 85025 COMPLETE CBC W/AUTO DIFF WBC: CPT | Performed by: PEDIATRICS

## 2022-07-18 PROCEDURE — 36415 COLL VENOUS BLD VENIPUNCTURE: CPT | Performed by: PEDIATRICS

## 2022-07-18 PROCEDURE — 83036 HEMOGLOBIN GLYCOSYLATED A1C: CPT | Performed by: PEDIATRICS

## 2022-07-18 PROCEDURE — 80053 COMPREHEN METABOLIC PANEL: CPT | Performed by: PEDIATRICS

## 2022-07-18 PROCEDURE — 84443 ASSAY THYROID STIM HORMONE: CPT | Performed by: PEDIATRICS

## 2022-07-18 PROCEDURE — 84146 ASSAY OF PROLACTIN: CPT | Performed by: PEDIATRICS

## 2022-07-18 PROCEDURE — 84153 ASSAY OF PSA TOTAL: CPT | Performed by: PEDIATRICS

## 2022-07-19 ENCOUNTER — TELEPHONE (OUTPATIENT)
Dept: UROLOGY | Facility: CLINIC | Age: 52
End: 2022-07-19
Payer: COMMERCIAL

## 2022-07-19 NOTE — TELEPHONE ENCOUNTER
Message left for patient Appt was made for pt appt letter mailed out and appt on portal.       ----- Message from Joseline Gillespie sent at 7/19/2022  3:31 PM CDT -----  Contact: Patient  Type:  Patient Returning Call    Who Called:Grant Rodriguez  Who Left Message for Patient:Dorothy  Does the patient know what this is regarding?:appointment  Would the patient rather a call back or a response via [facility]? Call back  Best Call Back Number:[phone]  Additional Information:

## 2022-07-19 NOTE — TELEPHONE ENCOUNTER
Attempted to call pt to inform him that he was referred to Urology for elevated prolactin level and I wanted to schedule an appt for him.  No answer.  Detailed message left on voice mail; appt scheduled and mailed.

## 2022-07-20 DIAGNOSIS — D35.3 BENIGN NEOPLASM OF PITUITARY GLAND AND CRANIOPHARYNGEAL DUCT (POUCH): ICD-10-CM

## 2022-07-20 DIAGNOSIS — I10 HYPERTENSION, UNSPECIFIED TYPE: ICD-10-CM

## 2022-07-20 DIAGNOSIS — N52.9 ERECTILE DYSFUNCTION, UNSPECIFIED ERECTILE DYSFUNCTION TYPE: ICD-10-CM

## 2022-07-20 DIAGNOSIS — E29.1 HYPOGONADISM IN MALE: ICD-10-CM

## 2022-07-20 DIAGNOSIS — Z12.5 PROSTATE CANCER SCREENING: ICD-10-CM

## 2022-07-20 DIAGNOSIS — D35.2 BENIGN NEOPLASM OF PITUITARY GLAND AND CRANIOPHARYNGEAL DUCT (POUCH): ICD-10-CM

## 2022-07-20 DIAGNOSIS — D35.2 PROLACTINOMA: ICD-10-CM

## 2022-07-20 RX ORDER — SILDENAFIL CITRATE 20 MG/1
TABLET ORAL
Qty: 50 TABLET | Refills: 11 | Status: SHIPPED | OUTPATIENT
Start: 2022-07-20 | End: 2024-02-20 | Stop reason: SDUPTHER

## 2022-08-04 ENCOUNTER — OFFICE VISIT (OUTPATIENT)
Dept: UROLOGY | Facility: CLINIC | Age: 52
End: 2022-08-04
Payer: COMMERCIAL

## 2022-08-04 VITALS
WEIGHT: 274.25 LBS | DIASTOLIC BLOOD PRESSURE: 80 MMHG | HEIGHT: 69 IN | SYSTOLIC BLOOD PRESSURE: 120 MMHG | BODY MASS INDEX: 40.62 KG/M2

## 2022-08-04 DIAGNOSIS — D35.2 PROLACTINOMA: Primary | ICD-10-CM

## 2022-08-04 DIAGNOSIS — E29.1 HYPOGONADISM IN MALE: ICD-10-CM

## 2022-08-04 PROCEDURE — 99999 PR PBB SHADOW E&M-EST. PATIENT-LVL III: CPT | Mod: PBBFAC,,, | Performed by: UROLOGY

## 2022-08-04 PROCEDURE — 3079F PR MOST RECENT DIASTOLIC BLOOD PRESSURE 80-89 MM HG: ICD-10-PCS | Mod: CPTII,S$GLB,, | Performed by: UROLOGY

## 2022-08-04 PROCEDURE — 3079F DIAST BP 80-89 MM HG: CPT | Mod: CPTII,S$GLB,, | Performed by: UROLOGY

## 2022-08-04 PROCEDURE — 3074F PR MOST RECENT SYSTOLIC BLOOD PRESSURE < 130 MM HG: ICD-10-PCS | Mod: CPTII,S$GLB,, | Performed by: UROLOGY

## 2022-08-04 PROCEDURE — 99214 PR OFFICE/OUTPT VISIT, EST, LEVL IV, 30-39 MIN: ICD-10-PCS | Mod: S$GLB,,, | Performed by: UROLOGY

## 2022-08-04 PROCEDURE — 3044F PR MOST RECENT HEMOGLOBIN A1C LEVEL <7.0%: ICD-10-PCS | Mod: CPTII,S$GLB,, | Performed by: UROLOGY

## 2022-08-04 PROCEDURE — 1159F MED LIST DOCD IN RCRD: CPT | Mod: CPTII,S$GLB,, | Performed by: UROLOGY

## 2022-08-04 PROCEDURE — 3008F BODY MASS INDEX DOCD: CPT | Mod: CPTII,S$GLB,, | Performed by: UROLOGY

## 2022-08-04 PROCEDURE — 1159F PR MEDICATION LIST DOCUMENTED IN MEDICAL RECORD: ICD-10-PCS | Mod: CPTII,S$GLB,, | Performed by: UROLOGY

## 2022-08-04 PROCEDURE — 1160F PR REVIEW ALL MEDS BY PRESCRIBER/CLIN PHARMACIST DOCUMENTED: ICD-10-PCS | Mod: CPTII,S$GLB,, | Performed by: UROLOGY

## 2022-08-04 PROCEDURE — 3074F SYST BP LT 130 MM HG: CPT | Mod: CPTII,S$GLB,, | Performed by: UROLOGY

## 2022-08-04 PROCEDURE — 3044F HG A1C LEVEL LT 7.0%: CPT | Mod: CPTII,S$GLB,, | Performed by: UROLOGY

## 2022-08-04 PROCEDURE — 3008F PR BODY MASS INDEX (BMI) DOCUMENTED: ICD-10-PCS | Mod: CPTII,S$GLB,, | Performed by: UROLOGY

## 2022-08-04 PROCEDURE — 99214 OFFICE O/P EST MOD 30 MIN: CPT | Mod: S$GLB,,, | Performed by: UROLOGY

## 2022-08-04 PROCEDURE — 1160F RVW MEDS BY RX/DR IN RCRD: CPT | Mod: CPTII,S$GLB,, | Performed by: UROLOGY

## 2022-08-04 PROCEDURE — 99999 PR PBB SHADOW E&M-EST. PATIENT-LVL III: ICD-10-PCS | Mod: PBBFAC,,, | Performed by: UROLOGY

## 2022-08-04 NOTE — PROGRESS NOTES
"Chief Complaint: Low T, elevated prolactin    HPI:   08/04/2022 - returns for follow-up, no issues in the interim, prolactin low, continues on the cabergoline without SEs, takes the T every 30 days or so, intermittent ED, controlled with viagra, no voiding issues, no GH    3/29/21: No interval problems.  Constipation resolved.   2/10/20: Been good no except some malignant HTN recently.  Constipation still a problem is to see GI.  8/5/19: No problems feeling fine.  Labs approp, cabergoline going well.  Miralax helps.  Still having constipation once a week.  Not needing flomax.  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  Labs not done.  Miralax working.  Taking cabergoline.  Reviewed history in detail.  6/12/18: Prolactin approp, T taken monthly.  Constipation managed with better activity.  Voiding okay on flomax.  8/21/17: Labs not back yet.  No new problems.  Does have some LUTS when he gets constipated; reviewed in detail rx for miralax.  2/16/17: Prolactin normal on cabergoline, T is low till.  Voiding fine no new problems.    8/3/16: No abd/pelvic pain and no exac/rel factors.  No hematuria.  No urolithiasis.  No urinary bother.  No  history.  Normal sexual function.  Recent prolactin very high; free T mildly low.  Referred by Dr. Jimenez. Had stopped cabergoline sometime 11/15 but has recently restarted it.  Was off flomax and having nocturia x8 after 3-4 in the morning; trouble getting all the way empty lots of double voiding. Has restarted flomax and now the nocturia has improved.  Has not been on T in a year and a half and hasn't missed it.  Drinks cokes some days.  Constipation is a problem.  10/12: Shraddha: "Grant Rodriguez is a 42 y.o. male with a history of prolactinoma.  He is currently on cabergoline for this.  He is also on TRT via IM injections.  He is here to discuss possible testopel.  He also has some LUTS.  He has nocturia x 3, decreased FOS, + straining.  He denies urgency.  He would like to try an alpha blocker. " He denies ED.  -> was given flomax; did not return for testopel.    Allergies:  Patient has no known allergies.    Medications:  has a current medication list which includes the following prescription(s): amlodipine, cabergoline, hydralazine, hydrochlorothiazide, sildenafil, testosterone cypionate, and potassium chloride sa, and the following Facility-Administered Medications: lactated ringers.    Review of Systems:  General: No fever, chills, fatigability, or weight loss.  Skin: No rashes, itching, or changes in color or texture of skin.  Chest: Denies LR, cyanosis, wheezing, cough, and sputum production.  Abdomen: Appetite fine. No weight loss. Denies diarrhea, abdominal pain, hematemesis, or blood in stool.  Musculoskeletal: No joint stiffness or swelling. Denies back pain.  : As above.  All other review of systems negative.    PMH:   has a past medical history of Hypertensive urgency (12/30/2019), Hypogonadism, male, IGT (impaired glucose tolerance), Obesity, and Prolactinoma.    PSH:   has a past surgical history that includes Colonoscopy (N/A, 12/10/2021).    FamHx: family history includes Diabetes in his father; Hypertension in his father.    SocHx:  reports that he has been smoking cigarettes. He has a 20.00 pack-year smoking history. He has never used smokeless tobacco. He reports current alcohol use. He reports current drug use. Frequency: 3.00 times per week. Drug: Marijuana.      Physical Exam:  Vitals:    08/04/22 0953   BP: 120/80     General: awake, alert, cooperative  Head: NC/AT  Ears: external ears normal  Eyes: sclera normal  Lungs: normal inspiration, NAD  Heart: well-perfused  Skin: The skin is warm and dry  Ext: No c/c/e.  Neuro: grossly intact, AOx3      Labs/Studies:   Lab Results   Component Value Date    WBC 9.79 07/18/2022    HGB 15.4 07/18/2022    HCT 45.9 07/18/2022     07/18/2022     07/18/2022    K 3.4 (L) 07/18/2022     07/18/2022    CREATININE 1.5 (H) 07/18/2022     BUN 17 07/18/2022    CO2 24 07/18/2022    TSH 3.567 07/18/2022    PSA 1.3 07/18/2022    INR 1.1 12/30/2019    HGBA1C 6.1 (H) 07/18/2022    CHOL 171 01/19/2022    TRIG 183 (H) 01/19/2022    HDL 41 01/19/2022    ALT 23 07/18/2022    AST 16 07/18/2022       Urinalysis performed in clinic, summary: UA normal  Bladder Scan performed in office:     8/3/16: PVR 15 ml.  PSA    7/16: 0.5    8/20: 0.95  Prolactin     1/20: 1.8    1/21: 0.8    Impression/Plan:   Low T/Elevated Prolactin - 6 months with labs, continue cabergoline, continue T 200mg q3wks, RTC 6 mo with labs.    HTN - well controlled    ED - continue viagra PRN    Randall Bueno MD

## 2022-08-22 ENCOUNTER — PATIENT MESSAGE (OUTPATIENT)
Dept: INTERNAL MEDICINE | Facility: CLINIC | Age: 52
End: 2022-08-22
Payer: COMMERCIAL

## 2022-08-22 NOTE — TELEPHONE ENCOUNTER
"May type and excuse stating "patient has a medical condition that will need frequent and sudden bathroom breaks. Please either consider allowing this to occur or excuse patient from jury service."  "

## 2022-09-12 RX ORDER — HYDRALAZINE HYDROCHLORIDE 25 MG/1
25 TABLET, FILM COATED ORAL 3 TIMES DAILY
Qty: 90 TABLET | Refills: 1 | Status: SHIPPED | OUTPATIENT
Start: 2022-09-12 | End: 2023-03-15

## 2022-10-03 DIAGNOSIS — R73.02 IGT (IMPAIRED GLUCOSE TOLERANCE): Primary | ICD-10-CM

## 2022-10-03 DIAGNOSIS — D35.2 PROLACTINOMA: ICD-10-CM

## 2022-10-04 RX ORDER — HYDROCHLOROTHIAZIDE 25 MG/1
25 TABLET ORAL DAILY
Qty: 30 TABLET | Refills: 5 | Status: SHIPPED | OUTPATIENT
Start: 2022-10-04 | End: 2023-04-11

## 2022-12-09 DIAGNOSIS — E29.1 HYPOGONADISM IN MALE: ICD-10-CM

## 2022-12-09 NOTE — TELEPHONE ENCOUNTER
Care Due:                  Date            Visit Type   Department     Provider  --------------------------------------------------------------------------------                                EP -                              PRIMARY      [facility] INTERNAL  Last Visit: 01-      [facility]   MEDICINE       Ed Banks  Next Visit: None Scheduled  None         None Found                                                            Last  Test          Frequency    Reason                     Performed    Due Date  --------------------------------------------------------------------------------    Office Visit  12 months..  hydroCHLOROthiazide,       01- 01-                             potassium................    [employer] Embedded Care Gaps. Reference number: [id number]. 12/09/2022   4:50:01 PM CST

## 2022-12-12 RX ORDER — TESTOSTERONE CYPIONATE 200 MG/ML
INJECTION, SOLUTION INTRAMUSCULAR
Qty: 1 ML | Refills: 0 | Status: SHIPPED | OUTPATIENT
Start: 2022-12-12 | End: 2023-02-10 | Stop reason: SDUPTHER

## 2023-01-29 ENCOUNTER — PATIENT MESSAGE (OUTPATIENT)
Dept: INTERNAL MEDICINE | Facility: CLINIC | Age: 53
End: 2023-01-29
Payer: COMMERCIAL

## 2023-01-30 DIAGNOSIS — Z11.3 SCREENING EXAMINATION FOR STD (SEXUALLY TRANSMITTED DISEASE): Primary | ICD-10-CM

## 2023-02-03 ENCOUNTER — LAB VISIT (OUTPATIENT)
Dept: LAB | Facility: HOSPITAL | Age: 53
End: 2023-02-03
Attending: UROLOGY
Payer: COMMERCIAL

## 2023-02-03 DIAGNOSIS — D35.2 PROLACTINOMA: ICD-10-CM

## 2023-02-03 DIAGNOSIS — R73.02 IGT (IMPAIRED GLUCOSE TOLERANCE): ICD-10-CM

## 2023-02-03 DIAGNOSIS — Z11.3 SCREENING EXAMINATION FOR STD (SEXUALLY TRANSMITTED DISEASE): ICD-10-CM

## 2023-02-03 DIAGNOSIS — E29.1 HYPOGONADISM IN MALE: ICD-10-CM

## 2023-02-03 LAB
ALBUMIN SERPL BCP-MCNC: 3.5 G/DL (ref 3.5–5.2)
ALBUMIN SERPL BCP-MCNC: 3.5 G/DL (ref 3.5–5.2)
ALP SERPL-CCNC: 78 U/L (ref 55–135)
ALP SERPL-CCNC: 78 U/L (ref 55–135)
ALT SERPL W/O P-5'-P-CCNC: 25 U/L (ref 10–44)
ALT SERPL W/O P-5'-P-CCNC: 25 U/L (ref 10–44)
ANION GAP SERPL CALC-SCNC: 9 MMOL/L (ref 8–16)
AST SERPL-CCNC: 18 U/L (ref 10–40)
AST SERPL-CCNC: 18 U/L (ref 10–40)
BASOPHILS # BLD AUTO: 0.07 K/UL (ref 0–0.2)
BASOPHILS NFR BLD: 0.8 % (ref 0–1.9)
BILIRUB DIRECT SERPL-MCNC: 0.2 MG/DL (ref 0.1–0.3)
BILIRUB SERPL-MCNC: 0.5 MG/DL (ref 0.1–1)
BILIRUB SERPL-MCNC: 0.5 MG/DL (ref 0.1–1)
BUN SERPL-MCNC: 18 MG/DL (ref 6–20)
CALCIUM SERPL-MCNC: 9.2 MG/DL (ref 8.7–10.5)
CHLORIDE SERPL-SCNC: 106 MMOL/L (ref 95–110)
CO2 SERPL-SCNC: 23 MMOL/L (ref 23–29)
CREAT SERPL-MCNC: 1.5 MG/DL (ref 0.5–1.4)
DIFFERENTIAL METHOD: NORMAL
EOSINOPHIL # BLD AUTO: 0.5 K/UL (ref 0–0.5)
EOSINOPHIL NFR BLD: 6.4 % (ref 0–8)
ERYTHROCYTE [DISTWIDTH] IN BLOOD BY AUTOMATED COUNT: 14 % (ref 11.5–14.5)
EST. GFR  (NO RACE VARIABLE): 55.3 ML/MIN/1.73 M^2
ESTIMATED AVG GLUCOSE: 134 MG/DL (ref 68–131)
GLUCOSE SERPL-MCNC: 117 MG/DL (ref 70–110)
HAV IGM SERPL QL IA: NORMAL
HBA1C MFR BLD: 6.3 % (ref 4–5.6)
HBV CORE IGM SERPL QL IA: NORMAL
HBV SURFACE AG SERPL QL IA: NORMAL
HCT VFR BLD AUTO: 44 % (ref 40–54)
HCV AB SERPL QL IA: NORMAL
HGB BLD-MCNC: 14.8 G/DL (ref 14–18)
HGB BLD-MCNC: 14.8 G/DL (ref 14–18)
HIV 1+2 AB+HIV1 P24 AG SERPL QL IA: NORMAL
IMM GRANULOCYTES # BLD AUTO: 0.01 K/UL (ref 0–0.04)
IMM GRANULOCYTES NFR BLD AUTO: 0.1 % (ref 0–0.5)
LYMPHOCYTES # BLD AUTO: 2.7 K/UL (ref 1–4.8)
LYMPHOCYTES NFR BLD: 31.4 % (ref 18–48)
MCH RBC QN AUTO: 30.5 PG (ref 27–31)
MCHC RBC AUTO-ENTMCNC: 33.6 G/DL (ref 32–36)
MCV RBC AUTO: 91 FL (ref 82–98)
MONOCYTES # BLD AUTO: 0.5 K/UL (ref 0.3–1)
MONOCYTES NFR BLD: 5.6 % (ref 4–15)
NEUTROPHILS # BLD AUTO: 4.7 K/UL (ref 1.8–7.7)
NEUTROPHILS NFR BLD: 55.7 % (ref 38–73)
NRBC BLD-RTO: 0 /100 WBC
PLATELET # BLD AUTO: 244 K/UL (ref 150–450)
PMV BLD AUTO: 10.7 FL (ref 9.2–12.9)
POTASSIUM SERPL-SCNC: 4.3 MMOL/L (ref 3.5–5.1)
PROLACTIN SERPL IA-MCNC: <0.8 NG/ML (ref 3.5–19.4)
PROT SERPL-MCNC: 6.6 G/DL (ref 6–8.4)
PROT SERPL-MCNC: 6.6 G/DL (ref 6–8.4)
RBC # BLD AUTO: 4.85 M/UL (ref 4.6–6.2)
SODIUM SERPL-SCNC: 138 MMOL/L (ref 136–145)
TSH SERPL DL<=0.005 MIU/L-ACNC: 2.64 UIU/ML (ref 0.4–4)
WBC # BLD AUTO: 8.43 K/UL (ref 3.9–12.7)

## 2023-02-03 PROCEDURE — 86592 SYPHILIS TEST NON-TREP QUAL: CPT | Performed by: NURSE PRACTITIONER

## 2023-02-03 PROCEDURE — 84443 ASSAY THYROID STIM HORMONE: CPT | Performed by: PEDIATRICS

## 2023-02-03 PROCEDURE — 85025 COMPLETE CBC W/AUTO DIFF WBC: CPT | Performed by: PEDIATRICS

## 2023-02-03 PROCEDURE — 36415 COLL VENOUS BLD VENIPUNCTURE: CPT | Performed by: NURSE PRACTITIONER

## 2023-02-03 PROCEDURE — 87389 HIV-1 AG W/HIV-1&-2 AB AG IA: CPT | Performed by: NURSE PRACTITIONER

## 2023-02-03 PROCEDURE — 83036 HEMOGLOBIN GLYCOSYLATED A1C: CPT | Performed by: PEDIATRICS

## 2023-02-03 PROCEDURE — 80074 ACUTE HEPATITIS PANEL: CPT | Performed by: NURSE PRACTITIONER

## 2023-02-03 PROCEDURE — 80053 COMPREHEN METABOLIC PANEL: CPT | Performed by: PEDIATRICS

## 2023-02-03 PROCEDURE — 84146 ASSAY OF PROLACTIN: CPT | Performed by: UROLOGY

## 2023-02-03 PROCEDURE — 80076 HEPATIC FUNCTION PANEL: CPT | Performed by: UROLOGY

## 2023-02-04 LAB — RPR SER QL: NORMAL

## 2023-02-07 ENCOUNTER — OFFICE VISIT (OUTPATIENT)
Dept: UROLOGY | Facility: CLINIC | Age: 53
End: 2023-02-07
Payer: COMMERCIAL

## 2023-02-07 VITALS
TEMPERATURE: 99 F | BODY MASS INDEX: 40.62 KG/M2 | HEART RATE: 80 BPM | HEIGHT: 69 IN | DIASTOLIC BLOOD PRESSURE: 65 MMHG | WEIGHT: 274.25 LBS | SYSTOLIC BLOOD PRESSURE: 104 MMHG

## 2023-02-07 DIAGNOSIS — K59.00 CONSTIPATION, UNSPECIFIED CONSTIPATION TYPE: ICD-10-CM

## 2023-02-07 DIAGNOSIS — E29.1 HYPOGONADISM IN MALE: Primary | ICD-10-CM

## 2023-02-07 DIAGNOSIS — D35.2 PROLACTINOMA: ICD-10-CM

## 2023-02-07 PROCEDURE — 99999 PR PBB SHADOW E&M-EST. PATIENT-LVL IV: ICD-10-PCS | Mod: PBBFAC,,, | Performed by: UROLOGY

## 2023-02-07 PROCEDURE — 99214 OFFICE O/P EST MOD 30 MIN: CPT | Mod: S$GLB,,, | Performed by: UROLOGY

## 2023-02-07 PROCEDURE — 99214 PR OFFICE/OUTPT VISIT, EST, LEVL IV, 30-39 MIN: ICD-10-PCS | Mod: S$GLB,,, | Performed by: UROLOGY

## 2023-02-07 PROCEDURE — 99999 PR PBB SHADOW E&M-EST. PATIENT-LVL IV: CPT | Mod: PBBFAC,,, | Performed by: UROLOGY

## 2023-02-07 NOTE — PROGRESS NOTES
Chief Complaint: Low T, elevated prolactin    HPI:   02/07/2023 - patient presents today for follow-up, no issues in the interim, testosterone going well, so taking his cabergoline and denies any side effects, labs for both appropriate with very low prolactin, notes issues with constipation as well as some reflux, wants to see GI, no gross hematuria or voiding issues    08/04/2022 - returns for follow-up, no issues in the interim, prolactin low, continues on the cabergoline without SEs, takes the T every 30 days or so, intermittent ED, controlled with viagra, no voiding issues, no GH    3/29/21: No interval problems.  Constipation resolved.   2/10/20: Been good no except some malignant HTN recently.  Constipation still a problem is to see GI.  8/5/19: No problems feeling fine.  Labs approp, cabergoline going well.  Miralax helps.  Still having constipation once a week.  Not needing flomax.  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  Labs not done.  Miralax working.  Taking cabergoline.  Reviewed history in detail.  6/12/18: Prolactin approp, T taken monthly.  Constipation managed with better activity.  Voiding okay on flomax.  8/21/17: Labs not back yet.  No new problems.  Does have some LUTS when he gets constipated; reviewed in detail rx for miralax.  2/16/17: Prolactin normal on cabergoline, T is low till.  Voiding fine no new problems.    8/3/16: No abd/pelvic pain and no exac/rel factors.  No hematuria.  No urolithiasis.  No urinary bother.  No  history.  Normal sexual function.  Recent prolactin very high; free T mildly low.  Referred by Dr. Jimenez. Had stopped cabergoline sometime 11/15 but has recently restarted it.  Was off flomax and having nocturia x8 after 3-4 in the morning; trouble getting all the way empty lots of double voiding. Has restarted flomax and now the nocturia has improved.  Has not been on T in a year and a half and hasn't missed it.  Drinks cokes some days.  Constipation is a problem.  10/12:  "Shraddha: "Grant Rodriguez is a 42 y.o. male with a history of prolactinoma.  He is currently on cabergoline for this.  He is also on TRT via IM injections.  He is here to discuss possible testopel.  He also has some LUTS.  He has nocturia x 3, decreased FOS, + straining.  He denies urgency.  He would like to try an alpha blocker. He denies ED.  -> was given flomax; did not return for testopel.    Allergies:  Patient has no known allergies.    Medications:  has a current medication list which includes the following prescription(s): amlodipine, cabergoline, hydralazine, hydrochlorothiazide, potassium chloride sa, sildenafil, and testosterone cypionate, and the following Facility-Administered Medications: lactated ringers.    Review of Systems:  General: No fever, chills  Skin: No rashes  Chest:  Denies cough and sputum production  Heart: Denies chest pain  Resp: Denies dyspnea  Abdomen: Denies diarrhea, abdominal pain, hematemesis, or blood in stool.  Musculoskeletal: No joint stiffness or swelling. Denies back pain.  : see HPI  Neuro: no dizziness or weakness      PMH:   has a past medical history of Hypertensive urgency (12/30/2019), Hypogonadism, male, IGT (impaired glucose tolerance), Obesity, and Prolactinoma.    PSH:   has a past surgical history that includes Colonoscopy (N/A, 12/10/2021).    FamHx: family history includes Diabetes in his father; Hypertension in his father.    SocHx:  reports that he has been smoking cigarettes. He has a 20.00 pack-year smoking history. He has never used smokeless tobacco. He reports current alcohol use. He reports current drug use. Frequency: 3.00 times per week. Drug: Marijuana.      Physical Exam:  Vitals:    02/07/23 0906   BP: 104/65   Pulse: 80   Temp: 98.6 °F (37 °C)     General: awake, alert, cooperative  Head: NC/AT  Ears: external ears normal  Eyes: sclera normal  Lungs: normal inspiration, NAD  Heart: well-perfused  Skin: The skin is warm and dry  Ext: No c/c/e.  Neuro: " grossly intact, AOx3      Labs/Studies:   Lab Results   Component Value Date    WBC 8.43 02/03/2023    HGB 14.8 02/03/2023    HGB 14.8 02/03/2023    HCT 44.0 02/03/2023     02/03/2023     02/03/2023    K 4.3 02/03/2023     02/03/2023    CREATININE 1.5 (H) 02/03/2023    BUN 18 02/03/2023    CO2 23 02/03/2023    TSH 2.639 02/03/2023    PSA 1.3 07/18/2022    INR 1.1 12/30/2019    HGBA1C 6.3 (H) 02/03/2023    CHOL 171 01/19/2022    TRIG 183 (H) 01/19/2022    HDL 41 01/19/2022    ALT 25 02/03/2023    ALT 25 02/03/2023    AST 18 02/03/2023    AST 18 02/03/2023       Urinalysis performed in clinic, summary: UA normal  Bladder Scan performed in office:     8/3/16: PVR 15 ml.  PSA    7/16: 0.5    8/20: 0.95  Prolactin     1/20: 1.8    1/21: 0.8    Impression/Plan:   Low T/Elevated Prolactin - 6 months with labs, continue cabergoline, continue T 200mg q3wks, RTC 6 mo with labs.    HTN - well controlled    ED - continue viagra PRN    Constipation - refer to GI    Randall Buneo MD

## 2023-02-10 DIAGNOSIS — E29.1 HYPOGONADISM IN MALE: ICD-10-CM

## 2023-02-10 NOTE — TELEPHONE ENCOUNTER
No new care gaps identified.  [facility] Embedded Care Gaps. Reference number: [id number]. 2/10/2023   10:00:38 AM CST

## 2023-02-15 RX ORDER — TESTOSTERONE CYPIONATE 200 MG/ML
200 INJECTION, SOLUTION INTRAMUSCULAR
Qty: 10 ML | Refills: 5 | Status: SHIPPED | OUTPATIENT
Start: 2023-02-15

## 2023-02-22 ENCOUNTER — OFFICE VISIT (OUTPATIENT)
Dept: INTERNAL MEDICINE | Facility: CLINIC | Age: 53
End: 2023-02-22
Payer: COMMERCIAL

## 2023-02-22 VITALS
HEIGHT: 69 IN | BODY MASS INDEX: 40.88 KG/M2 | HEART RATE: 67 BPM | TEMPERATURE: 98 F | OXYGEN SATURATION: 96 % | SYSTOLIC BLOOD PRESSURE: 112 MMHG | WEIGHT: 276 LBS | DIASTOLIC BLOOD PRESSURE: 70 MMHG

## 2023-02-22 DIAGNOSIS — I10 PRIMARY HYPERTENSION: ICD-10-CM

## 2023-02-22 DIAGNOSIS — E29.1 MALE HYPOGONADISM: ICD-10-CM

## 2023-02-22 DIAGNOSIS — G47.33 OSA (OBSTRUCTIVE SLEEP APNEA): ICD-10-CM

## 2023-02-22 DIAGNOSIS — I70.1 RENAL ARTERY STENOSIS: ICD-10-CM

## 2023-02-22 DIAGNOSIS — N18.30 STAGE 3 CHRONIC KIDNEY DISEASE, UNSPECIFIED WHETHER STAGE 3A OR 3B CKD: ICD-10-CM

## 2023-02-22 DIAGNOSIS — E66.9 CLASS 2 OBESITY WITH BODY MASS INDEX (BMI) OF 36.0 TO 36.9 IN ADULT, UNSPECIFIED OBESITY TYPE, UNSPECIFIED WHETHER SERIOUS COMORBIDITY PRESENT: ICD-10-CM

## 2023-02-22 DIAGNOSIS — D35.2 PROLACTINOMA: ICD-10-CM

## 2023-02-22 DIAGNOSIS — Z00.00 WELL ADULT EXAM: Primary | ICD-10-CM

## 2023-02-22 DIAGNOSIS — N40.0 BENIGN PROSTATIC HYPERPLASIA WITHOUT LOWER URINARY TRACT SYMPTOMS: ICD-10-CM

## 2023-02-22 DIAGNOSIS — R73.02 IGT (IMPAIRED GLUCOSE TOLERANCE): ICD-10-CM

## 2023-02-22 PROCEDURE — 99999 PR PBB SHADOW E&M-EST. PATIENT-LVL IV: CPT | Mod: PBBFAC,,, | Performed by: PEDIATRICS

## 2023-02-22 PROCEDURE — 99999 PR PBB SHADOW E&M-EST. PATIENT-LVL IV: ICD-10-PCS | Mod: PBBFAC,,, | Performed by: PEDIATRICS

## 2023-02-22 PROCEDURE — 99396 PR PREVENTIVE VISIT,EST,40-64: ICD-10-PCS | Mod: S$GLB,,, | Performed by: PEDIATRICS

## 2023-02-22 PROCEDURE — 99396 PREV VISIT EST AGE 40-64: CPT | Mod: S$GLB,,, | Performed by: PEDIATRICS

## 2023-02-22 NOTE — PROGRESS NOTES
Subjective:       Patient ID: Grant Rodriguez is a 53 y.o. male.    Chief Complaint: Annual Exam    Grant Rodriguez is a 51 y.o. male who presents to clinic for a his annual    1) HTN: no at home BP checks. No HTNive Sx. Compliant with meds.   2) IGT: last glucose stable. asymptomatic  3) Obesity: not following D&E, has just expressed desire to resume.   4) Hypogonad/prolactinoma: sees urology, taking testosterone, taking Dostinex 2 Sunday 1 wed and fri. He is hesitant to taper because of previous SE when tapered greater than 3yr ago.   5) CKD: sees renal  6) JILLIAN: noncompliant with CPAP  7) renal artery stenosis: followed by nephrology in past    LABS REVIEWED AND DISCUSSED WITH PATIENT: CMP, PSA, lipid panel, A1C    Review of Systems   Constitutional:  Negative for activity change, appetite change, chills, diaphoresis, fatigue, fever and unexpected weight change.   HENT:  Negative for nasal congestion, ear pain, mouth sores, nosebleeds, postnasal drip, rhinorrhea, sneezing and sore throat.    Eyes:  Negative for photophobia, pain, discharge, redness and visual disturbance.   Respiratory:  Negative for cough, chest tightness, shortness of breath, wheezing and stridor.    Cardiovascular:  Negative for chest pain, palpitations and leg swelling.   Gastrointestinal:  Positive for constipation (both chronic, has not yet seen GI) and reflux. Negative for abdominal distention, blood in stool, diarrhea, nausea and vomiting.   Genitourinary:  Negative for decreased urine volume, difficulty urinating, dysuria, flank pain, frequency, genital sores, hematuria and urgency.   Musculoskeletal:  Negative for arthralgias, back pain, joint swelling, neck pain and neck stiffness.   Integumentary:  Negative for color change, pallor, rash and wound.   Neurological:  Negative for dizziness, syncope, speech difficulty, weakness, light-headedness and headaches.   Hematological:  Negative for adenopathy. Does not bruise/bleed easily.    Psychiatric/Behavioral:  Negative for confusion, decreased concentration, dysphoric mood, hallucinations, sleep disturbance and suicidal ideas. The patient is not nervous/anxious.    All other systems reviewed and are negative.      Objective:      Physical Exam  Vitals and nursing note reviewed.   Constitutional:       General: He is not in acute distress.     Appearance: He is well-developed.   Neck:      Thyroid: No thyromegaly.      Vascular: No JVD.   Cardiovascular:      Rate and Rhythm: Normal rate and regular rhythm.      Heart sounds: Normal heart sounds. No murmur heard.  Pulmonary:      Effort: Pulmonary effort is normal. No respiratory distress.      Breath sounds: Normal breath sounds. No wheezing or rales.   Abdominal:      General: There is no distension.      Palpations: Abdomen is soft. There is no mass.      Tenderness: There is no abdominal tenderness. There is no guarding.   Musculoskeletal:      Right lower leg: No edema.      Left lower leg: No edema.   Lymphadenopathy:      Cervical: No cervical adenopathy.   Skin:     Capillary Refill: Capillary refill takes less than 2 seconds.      Findings: No rash.   Neurological:      General: No focal deficit present.      Mental Status: He is alert and oriented to person, place, and time.      Cranial Nerves: No cranial nerve deficit.      Coordination: Coordination normal.   Psychiatric:         Mood and Affect: Mood normal.         Behavior: Behavior normal.         Thought Content: Thought content normal.         Judgment: Judgment normal.       Assessment:       Problem List Items Addressed This Visit       BPH (benign prostatic hyperplasia)    IGT (impaired glucose tolerance)    Relevant Orders    Hemoglobin A1C    Male hypogonadism    Obesity    JILLIAN (obstructive sleep apnea)    Primary hypertension    Relevant Orders    Comprehensive Metabolic Panel    Lipid Panel    Prolactinoma    Relevant Orders    PROLACTIN    PROLACTIN    Renal artery  stenosis    Relevant Orders    US Renal Artery Stenosis Hyperten (xpd)    Stage 3 chronic kidney disease     Other Visit Diagnoses       Well adult exam    -  Primary            Plan:     Well adult exam    Primary hypertension  -     Comprehensive Metabolic Panel; Future; Expected date: 02/22/2023  -     Lipid Panel; Future; Expected date: 02/22/2023    Stage 3 chronic kidney disease, unspecified whether stage 3a or 3b CKD    Prolactinoma  -     PROLACTIN; Future; Expected date: 02/22/2023  -     PROLACTIN; Future; Expected date: 02/22/2023    Renal artery stenosis  -     US Renal Artery Stenosis Hyperten (xpd); Future; Expected date: 02/22/2023    JILLIAN (obstructive sleep apnea)    Class 2 obesity with body mass index (BMI) of 36.0 to 36.9 in adult, unspecified obesity type, unspecified whether serious comorbidity present    Male hypogonadism    IGT (impaired glucose tolerance)  -     Hemoglobin A1C; Future; Expected date: 02/22/2023    Benign prostatic hyperplasia without lower urinary tract symptoms      HMI discussed. Vaccines discussed. D&E and weight loss. Low carb diet. Reduce alcohol for preDM. We discussed his renal artery stenosis and recommendations for statin. He does not wish to take any more meds at this point in time but would like to exercise and diet to lose weight. We have discussed his suppressed prolactin level and endo recommendations, tapering of Dostinex discussed over the next mo. Follow up with prolactin and telemed in 3mo. Keep subspecialty care. Follow up yearly.     Scribe Attestation:   I, Pavel Figueredo, am scribing for, and in the presence of, Dr. Ed Banks Jr. I performed the above scribed service and the documentation accurately describes the services I performed. I attest to the accuracy of the note.    I, Dr. Ed Banks Jr, reviewed documentation as scribed above. I personally performed the services described in this documentation.  I agree that the record reflects  my personal performance and is accurate and complete. Ed Banks Jr., MD.  02/22/2023

## 2023-04-10 NOTE — TELEPHONE ENCOUNTER
No new care gaps identified.  [facility] Embedded Care Gaps. Reference number: [id number]. 4/10/2023   12:22:52 PM CDT

## 2023-04-11 RX ORDER — HYDROCHLOROTHIAZIDE 25 MG/1
TABLET ORAL
Qty: 90 TABLET | Refills: 3 | Status: SHIPPED | OUTPATIENT
Start: 2023-04-11 | End: 2024-01-08

## 2023-04-11 NOTE — TELEPHONE ENCOUNTER
Refill Decision Note      Refill Decision Note   Grant Rodriguez  is requesting a refill authorization.  Brief Assessment and Rationale for Refill:  Approve     Medication Therapy Plan:  Renal fxn reviewed; Dose ok based on labs taken 2/3/23      Pharmacist review requested: Yes   Extended chart review required: Yes   Comments:     Note composed:5:30 AM 04/11/2023             Appointments     Last Visit   2/22/2023 NARENDRA Banks Jr., MD   Next Visit   Visit date not found NARENDRA Banks Jr., MD

## 2023-04-11 NOTE — TELEPHONE ENCOUNTER
Refill Routing Note   Medication(s) are not appropriate for processing by [facility] for the following reason(s):      Required labs abnormal    [facility] action(s):  Defer              Appointments  past 12m or future 3m with PCP    Date Provider   Last Visit   2/22/2023 NARENDRA Banks Jr., MD   Next Visit   Visit date not found NARENDRA Banks Jr., MD   ED visits in past 90 days: 0        Note composed:9:06 PM 04/10/2023

## 2023-07-07 ENCOUNTER — PATIENT MESSAGE (OUTPATIENT)
Dept: INFECTIOUS DISEASES | Facility: CLINIC | Age: 53
End: 2023-07-07
Payer: COMMERCIAL

## 2023-09-08 DIAGNOSIS — D35.2 PROLACTINOMA: ICD-10-CM

## 2023-09-11 ENCOUNTER — TELEPHONE (OUTPATIENT)
Dept: UROLOGY | Facility: CLINIC | Age: 53
End: 2023-09-11
Payer: COMMERCIAL

## 2023-09-11 RX ORDER — CABERGOLINE 0.5 MG/1
TABLET ORAL
Qty: 48 TABLET | Refills: 11 | Status: SHIPPED | OUTPATIENT
Start: 2023-09-11

## 2023-09-24 ENCOUNTER — HOSPITAL ENCOUNTER (OUTPATIENT)
Facility: HOSPITAL | Age: 53
Discharge: HOME | End: 2023-09-25
Attending: EMERGENCY MEDICINE | Admitting: FAMILY MEDICINE
Payer: COMMERCIAL

## 2023-09-24 ENCOUNTER — ANESTHESIA EVENT (OUTPATIENT)
Dept: ENDOSCOPY | Facility: HOSPITAL | Age: 53
End: 2023-09-24
Payer: COMMERCIAL

## 2023-09-24 ENCOUNTER — ANESTHESIA (OUTPATIENT)
Dept: ENDOSCOPY | Facility: HOSPITAL | Age: 53
End: 2023-09-24
Payer: COMMERCIAL

## 2023-09-24 ENCOUNTER — PATIENT MESSAGE (OUTPATIENT)
Dept: GASTROENTEROLOGY | Facility: HOSPITAL | Age: 53
End: 2023-09-24
Payer: COMMERCIAL

## 2023-09-24 DIAGNOSIS — K25.4 GASTROINTESTINAL HEMORRHAGE ASSOCIATED WITH GASTRIC ULCER: ICD-10-CM

## 2023-09-24 DIAGNOSIS — K25.9 MULTIPLE GASTRIC ULCERS: Primary | ICD-10-CM

## 2023-09-24 DIAGNOSIS — R11.10 VOMITING, UNSPECIFIED: ICD-10-CM

## 2023-09-24 DIAGNOSIS — K44.9 HIATAL HERNIA: ICD-10-CM

## 2023-09-24 DIAGNOSIS — K25.9 MULTIPLE GASTRIC ULCERS: ICD-10-CM

## 2023-09-24 DIAGNOSIS — K92.2 GASTROINTESTINAL HEMORRHAGE, UNSPECIFIED GASTROINTESTINAL HEMORRHAGE TYPE: Primary | ICD-10-CM

## 2023-09-24 DIAGNOSIS — R07.9 CHEST PAIN: ICD-10-CM

## 2023-09-24 DIAGNOSIS — R11.10 EMESIS: ICD-10-CM

## 2023-09-24 DIAGNOSIS — F10.10 ETOH ABUSE: ICD-10-CM

## 2023-09-24 LAB
ABO + RH BLD: NORMAL
ALBUMIN SERPL BCP-MCNC: 3.7 G/DL (ref 3.5–5.2)
ALBUMIN SERPL BCP-MCNC: 3.9 G/DL (ref 3.5–5.2)
ALP SERPL-CCNC: 90 U/L (ref 55–135)
ALP SERPL-CCNC: 97 U/L (ref 55–135)
ALT SERPL W/O P-5'-P-CCNC: 17 U/L (ref 10–44)
ALT SERPL W/O P-5'-P-CCNC: 19 U/L (ref 10–44)
ANION GAP SERPL CALC-SCNC: 14 MMOL/L (ref 8–16)
ANION GAP SERPL CALC-SCNC: 15 MMOL/L (ref 8–16)
AST SERPL-CCNC: 13 U/L (ref 10–40)
AST SERPL-CCNC: 14 U/L (ref 10–40)
BACTERIA #/AREA URNS HPF: ABNORMAL /HPF
BASOPHILS # BLD AUTO: 0.03 K/UL (ref 0–0.2)
BASOPHILS # BLD AUTO: 0.04 K/UL (ref 0–0.2)
BASOPHILS NFR BLD: 0.2 % (ref 0–1.9)
BASOPHILS NFR BLD: 0.3 % (ref 0–1.9)
BILIRUB SERPL-MCNC: 0.7 MG/DL (ref 0.1–1)
BILIRUB SERPL-MCNC: 0.8 MG/DL (ref 0.1–1)
BILIRUB UR QL STRIP: NEGATIVE
BLD GP AB SCN CELLS X3 SERPL QL: NORMAL
BUN SERPL-MCNC: 14 MG/DL (ref 6–20)
BUN SERPL-MCNC: 15 MG/DL (ref 6–20)
CALCIUM SERPL-MCNC: 10.1 MG/DL (ref 8.7–10.5)
CALCIUM SERPL-MCNC: 9.6 MG/DL (ref 8.7–10.5)
CHLORIDE SERPL-SCNC: 98 MMOL/L (ref 95–110)
CHLORIDE SERPL-SCNC: 98 MMOL/L (ref 95–110)
CLARITY UR: CLEAR
CO2 SERPL-SCNC: 25 MMOL/L (ref 23–29)
CO2 SERPL-SCNC: 26 MMOL/L (ref 23–29)
COLOR UR: YELLOW
CREAT SERPL-MCNC: 1.5 MG/DL (ref 0.5–1.4)
CREAT SERPL-MCNC: 1.5 MG/DL (ref 0.5–1.4)
DIFFERENTIAL METHOD: ABNORMAL
DIFFERENTIAL METHOD: ABNORMAL
EOSINOPHIL # BLD AUTO: 0 K/UL (ref 0–0.5)
EOSINOPHIL # BLD AUTO: 0 K/UL (ref 0–0.5)
EOSINOPHIL NFR BLD: 0 % (ref 0–8)
EOSINOPHIL NFR BLD: 0 % (ref 0–8)
ERYTHROCYTE [DISTWIDTH] IN BLOOD BY AUTOMATED COUNT: 12.6 % (ref 11.5–14.5)
ERYTHROCYTE [DISTWIDTH] IN BLOOD BY AUTOMATED COUNT: 12.6 % (ref 11.5–14.5)
EST. GFR  (NO RACE VARIABLE): 55 ML/MIN/1.73 M^2
EST. GFR  (NO RACE VARIABLE): 55 ML/MIN/1.73 M^2
ESTIMATED AVG GLUCOSE: 134 MG/DL (ref 68–131)
ETHANOL SERPL-MCNC: <10 MG/DL
GLUCOSE SERPL-MCNC: 162 MG/DL (ref 70–110)
GLUCOSE SERPL-MCNC: 170 MG/DL (ref 70–110)
GLUCOSE UR QL STRIP: NEGATIVE
HBA1C MFR BLD: 6.3 % (ref 4–5.6)
HCT VFR BLD AUTO: 46.2 % (ref 40–54)
HCT VFR BLD AUTO: 48.6 % (ref 40–54)
HCT VFR BLD AUTO: 49.6 % (ref 40–54)
HGB BLD-MCNC: 16.2 G/DL (ref 14–18)
HGB BLD-MCNC: 16.6 G/DL (ref 14–18)
HGB BLD-MCNC: 17.5 G/DL (ref 14–18)
HGB UR QL STRIP: ABNORMAL
HYALINE CASTS #/AREA URNS LPF: 0 /LPF
IMM GRANULOCYTES # BLD AUTO: 0.04 K/UL (ref 0–0.04)
IMM GRANULOCYTES # BLD AUTO: 0.05 K/UL (ref 0–0.04)
IMM GRANULOCYTES NFR BLD AUTO: 0.3 % (ref 0–0.5)
IMM GRANULOCYTES NFR BLD AUTO: 0.4 % (ref 0–0.5)
INR PPP: 1.1 (ref 0.8–1.2)
KETONES UR QL STRIP: NEGATIVE
LEUKOCYTE ESTERASE UR QL STRIP: NEGATIVE
LIPASE SERPL-CCNC: 50 U/L (ref 4–60)
LYMPHOCYTES # BLD AUTO: 1.5 K/UL (ref 1–4.8)
LYMPHOCYTES # BLD AUTO: 1.7 K/UL (ref 1–4.8)
LYMPHOCYTES NFR BLD: 11.5 % (ref 18–48)
LYMPHOCYTES NFR BLD: 12.7 % (ref 18–48)
MCH RBC QN AUTO: 29.7 PG (ref 27–31)
MCH RBC QN AUTO: 30.2 PG (ref 27–31)
MCHC RBC AUTO-ENTMCNC: 34.2 G/DL (ref 32–36)
MCHC RBC AUTO-ENTMCNC: 35.3 G/DL (ref 32–36)
MCV RBC AUTO: 86 FL (ref 82–98)
MCV RBC AUTO: 87 FL (ref 82–98)
MICROSCOPIC COMMENT: ABNORMAL
MONOCYTES # BLD AUTO: 0.6 K/UL (ref 0.3–1)
MONOCYTES # BLD AUTO: 0.6 K/UL (ref 0.3–1)
MONOCYTES NFR BLD: 4.2 % (ref 4–15)
MONOCYTES NFR BLD: 4.4 % (ref 4–15)
NEUTROPHILS # BLD AUTO: 10.7 K/UL (ref 1.8–7.7)
NEUTROPHILS # BLD AUTO: 11.2 K/UL (ref 1.8–7.7)
NEUTROPHILS NFR BLD: 82.5 % (ref 38–73)
NEUTROPHILS NFR BLD: 83.5 % (ref 38–73)
NITRITE UR QL STRIP: NEGATIVE
NRBC BLD-RTO: 0 /100 WBC
NRBC BLD-RTO: 0 /100 WBC
OB PNL STL: NEGATIVE
PH UR STRIP: 6 [PH] (ref 5–8)
PLATELET # BLD AUTO: 273 K/UL (ref 150–450)
PLATELET # BLD AUTO: 275 K/UL (ref 150–450)
PMV BLD AUTO: 9.7 FL (ref 9.2–12.9)
PMV BLD AUTO: 9.9 FL (ref 9.2–12.9)
POTASSIUM SERPL-SCNC: 3.5 MMOL/L (ref 3.5–5.1)
POTASSIUM SERPL-SCNC: 4.2 MMOL/L (ref 3.5–5.1)
PROT SERPL-MCNC: 8.1 G/DL (ref 6–8.4)
PROT SERPL-MCNC: 8.6 G/DL (ref 6–8.4)
PROT UR QL STRIP: ABNORMAL
PROTHROMBIN TIME: 11.2 SEC (ref 9–12.5)
RBC # BLD AUTO: 5.58 M/UL (ref 4.6–6.2)
RBC # BLD AUTO: 5.8 M/UL (ref 4.6–6.2)
RBC #/AREA URNS HPF: 5 /HPF (ref 0–4)
SODIUM SERPL-SCNC: 137 MMOL/L (ref 136–145)
SODIUM SERPL-SCNC: 139 MMOL/L (ref 136–145)
SP GR UR STRIP: 1.02 (ref 1–1.03)
SPECIMEN OUTDATE: NORMAL
SQUAMOUS #/AREA URNS HPF: 0 /HPF
TROPONIN I SERPL DL<=0.01 NG/ML-MCNC: 0.01 NG/ML (ref 0–0.03)
TROPONIN I SERPL DL<=0.01 NG/ML-MCNC: 0.02 NG/ML (ref 0–0.03)
URN SPEC COLLECT METH UR: ABNORMAL
UROBILINOGEN UR STRIP-ACNC: NEGATIVE EU/DL
WBC # BLD AUTO: 12.83 K/UL (ref 3.9–12.7)
WBC # BLD AUTO: 13.52 K/UL (ref 3.9–12.7)
WBC #/AREA URNS HPF: 1 /HPF (ref 0–5)

## 2023-09-24 PROCEDURE — 99222 PR INITIAL HOSPITAL CARE,LEVL II: ICD-10-PCS | Mod: 25,,, | Performed by: INTERNAL MEDICINE

## 2023-09-24 PROCEDURE — 82077 ASSAY SPEC XCP UR&BREATH IA: CPT | Performed by: NURSE PRACTITIONER

## 2023-09-24 PROCEDURE — 85610 PROTHROMBIN TIME: CPT | Performed by: EMERGENCY MEDICINE

## 2023-09-24 PROCEDURE — 96375 TX/PRO/DX INJ NEW DRUG ADDON: CPT

## 2023-09-24 PROCEDURE — 36415 COLL VENOUS BLD VENIPUNCTURE: CPT | Performed by: EMERGENCY MEDICINE

## 2023-09-24 PROCEDURE — 43239 PR EGD, FLEX, W/BIOPSY, SGL/MULTI: ICD-10-PCS | Mod: ,,, | Performed by: INTERNAL MEDICINE

## 2023-09-24 PROCEDURE — G0378 HOSPITAL OBSERVATION PER HR: HCPCS

## 2023-09-24 PROCEDURE — 81000 URINALYSIS NONAUTO W/SCOPE: CPT | Performed by: EMERGENCY MEDICINE

## 2023-09-24 PROCEDURE — 25000003 PHARM REV CODE 250: Performed by: EMERGENCY MEDICINE

## 2023-09-24 PROCEDURE — 63600175 PHARM REV CODE 636 W HCPCS: Performed by: NURSE ANESTHETIST, CERTIFIED REGISTERED

## 2023-09-24 PROCEDURE — 85025 COMPLETE CBC W/AUTO DIFF WBC: CPT | Mod: 91 | Performed by: NURSE PRACTITIONER

## 2023-09-24 PROCEDURE — 37000009 HC ANESTHESIA EA ADD 15 MINS: Performed by: INTERNAL MEDICINE

## 2023-09-24 PROCEDURE — 63600175 PHARM REV CODE 636 W HCPCS: Performed by: INTERNAL MEDICINE

## 2023-09-24 PROCEDURE — 96361 HYDRATE IV INFUSION ADD-ON: CPT

## 2023-09-24 PROCEDURE — 25000003 PHARM REV CODE 250: Performed by: INTERNAL MEDICINE

## 2023-09-24 PROCEDURE — 85018 HEMOGLOBIN: CPT | Performed by: NURSE PRACTITIONER

## 2023-09-24 PROCEDURE — 88305 TISSUE EXAM BY PATHOLOGIST: ICD-10-PCS | Mod: 26,,, | Performed by: PATHOLOGY

## 2023-09-24 PROCEDURE — 25000003 PHARM REV CODE 250: Performed by: NURSE ANESTHETIST, CERTIFIED REGISTERED

## 2023-09-24 PROCEDURE — 93010 EKG 12-LEAD: ICD-10-PCS | Mod: ,,, | Performed by: INTERNAL MEDICINE

## 2023-09-24 PROCEDURE — 88305 TISSUE EXAM BY PATHOLOGIST: CPT | Mod: 26,,, | Performed by: PATHOLOGY

## 2023-09-24 PROCEDURE — 85025 COMPLETE CBC W/AUTO DIFF WBC: CPT | Performed by: EMERGENCY MEDICINE

## 2023-09-24 PROCEDURE — 80053 COMPREHEN METABOLIC PANEL: CPT | Mod: 91 | Performed by: NURSE PRACTITIONER

## 2023-09-24 PROCEDURE — 86900 BLOOD TYPING SEROLOGIC ABO: CPT | Performed by: EMERGENCY MEDICINE

## 2023-09-24 PROCEDURE — 85014 HEMATOCRIT: CPT | Performed by: NURSE PRACTITIONER

## 2023-09-24 PROCEDURE — 63600175 PHARM REV CODE 636 W HCPCS: Performed by: NURSE PRACTITIONER

## 2023-09-24 PROCEDURE — 27201012 HC FORCEPS, HOT/COLD, DISP: Performed by: INTERNAL MEDICINE

## 2023-09-24 PROCEDURE — 82272 OCCULT BLD FECES 1-3 TESTS: CPT | Performed by: EMERGENCY MEDICINE

## 2023-09-24 PROCEDURE — 96374 THER/PROPH/DIAG INJ IV PUSH: CPT

## 2023-09-24 PROCEDURE — 88305 TISSUE EXAM BY PATHOLOGIST: CPT | Performed by: PATHOLOGY

## 2023-09-24 PROCEDURE — 84484 ASSAY OF TROPONIN QUANT: CPT | Performed by: NURSE PRACTITIONER

## 2023-09-24 PROCEDURE — 21400001 HC TELEMETRY ROOM

## 2023-09-24 PROCEDURE — 83690 ASSAY OF LIPASE: CPT | Performed by: EMERGENCY MEDICINE

## 2023-09-24 PROCEDURE — 83036 HEMOGLOBIN GLYCOSYLATED A1C: CPT | Performed by: NURSE PRACTITIONER

## 2023-09-24 PROCEDURE — 84484 ASSAY OF TROPONIN QUANT: CPT | Mod: 91 | Performed by: EMERGENCY MEDICINE

## 2023-09-24 PROCEDURE — 36415 COLL VENOUS BLD VENIPUNCTURE: CPT | Performed by: NURSE PRACTITIONER

## 2023-09-24 PROCEDURE — 25000003 PHARM REV CODE 250: Performed by: NURSE PRACTITIONER

## 2023-09-24 PROCEDURE — 43239 EGD BIOPSY SINGLE/MULTIPLE: CPT | Performed by: INTERNAL MEDICINE

## 2023-09-24 PROCEDURE — 93005 ELECTROCARDIOGRAM TRACING: CPT

## 2023-09-24 PROCEDURE — 37000008 HC ANESTHESIA 1ST 15 MINUTES: Performed by: INTERNAL MEDICINE

## 2023-09-24 PROCEDURE — 80053 COMPREHEN METABOLIC PANEL: CPT | Performed by: EMERGENCY MEDICINE

## 2023-09-24 PROCEDURE — 99222 1ST HOSP IP/OBS MODERATE 55: CPT | Mod: 25,,, | Performed by: INTERNAL MEDICINE

## 2023-09-24 PROCEDURE — 99285 EMERGENCY DEPT VISIT HI MDM: CPT | Mod: 25

## 2023-09-24 PROCEDURE — 63600175 PHARM REV CODE 636 W HCPCS: Performed by: EMERGENCY MEDICINE

## 2023-09-24 PROCEDURE — 43239 EGD BIOPSY SINGLE/MULTIPLE: CPT | Mod: ,,, | Performed by: INTERNAL MEDICINE

## 2023-09-24 PROCEDURE — C9113 INJ PANTOPRAZOLE SODIUM, VIA: HCPCS | Performed by: EMERGENCY MEDICINE

## 2023-09-24 PROCEDURE — 93010 ELECTROCARDIOGRAM REPORT: CPT | Mod: ,,, | Performed by: INTERNAL MEDICINE

## 2023-09-24 RX ORDER — PANTOPRAZOLE SODIUM 40 MG/10ML
80 INJECTION, POWDER, LYOPHILIZED, FOR SOLUTION INTRAVENOUS
Status: COMPLETED | OUTPATIENT
Start: 2023-09-24 | End: 2023-09-24

## 2023-09-24 RX ORDER — CHLORDIAZEPOXIDE HYDROCHLORIDE 25 MG/1
25 CAPSULE, GELATIN COATED ORAL 3 TIMES DAILY
Status: DISCONTINUED | OUTPATIENT
Start: 2023-09-24 | End: 2023-09-25

## 2023-09-24 RX ORDER — NALOXONE HCL 0.4 MG/ML
0.02 VIAL (ML) INJECTION
Status: DISCONTINUED | OUTPATIENT
Start: 2023-09-24 | End: 2023-09-25 | Stop reason: HOSPADM

## 2023-09-24 RX ORDER — OCTREOTIDE ACETATE 50 UG/ML
50 INJECTION, SOLUTION INTRAVENOUS; SUBCUTANEOUS
Status: COMPLETED | OUTPATIENT
Start: 2023-09-24 | End: 2023-09-24

## 2023-09-24 RX ORDER — LABETALOL HYDROCHLORIDE 5 MG/ML
10 INJECTION, SOLUTION INTRAVENOUS EVERY 6 HOURS PRN
Status: DISCONTINUED | OUTPATIENT
Start: 2023-09-24 | End: 2023-09-25 | Stop reason: HOSPADM

## 2023-09-24 RX ORDER — PANTOPRAZOLE SODIUM 40 MG/10ML
40 INJECTION, POWDER, LYOPHILIZED, FOR SOLUTION INTRAVENOUS 2 TIMES DAILY
Status: DISCONTINUED | OUTPATIENT
Start: 2023-09-24 | End: 2023-09-24

## 2023-09-24 RX ORDER — LIDOCAINE HYDROCHLORIDE 20 MG/ML
INJECTION, SOLUTION EPIDURAL; INFILTRATION; INTRACAUDAL; PERINEURAL
Status: DISCONTINUED | OUTPATIENT
Start: 2023-09-24 | End: 2023-09-24

## 2023-09-24 RX ORDER — ACETAMINOPHEN 325 MG/1
650 TABLET ORAL EVERY 6 HOURS PRN
Status: DISCONTINUED | OUTPATIENT
Start: 2023-09-24 | End: 2023-09-25 | Stop reason: HOSPADM

## 2023-09-24 RX ORDER — PROPOFOL 10 MG/ML
VIAL (ML) INTRAVENOUS
Status: DISCONTINUED | OUTPATIENT
Start: 2023-09-24 | End: 2023-09-24

## 2023-09-24 RX ORDER — IBUPROFEN 200 MG
24 TABLET ORAL
Status: DISCONTINUED | OUTPATIENT
Start: 2023-09-24 | End: 2023-09-25 | Stop reason: HOSPADM

## 2023-09-24 RX ORDER — AMLODIPINE BESYLATE 10 MG/1
10 TABLET ORAL DAILY
Status: DISCONTINUED | OUTPATIENT
Start: 2023-09-24 | End: 2023-09-25 | Stop reason: HOSPADM

## 2023-09-24 RX ORDER — SODIUM CHLORIDE 0.9 % (FLUSH) 0.9 %
10 SYRINGE (ML) INJECTION EVERY 12 HOURS PRN
Status: DISCONTINUED | OUTPATIENT
Start: 2023-09-24 | End: 2023-09-25 | Stop reason: HOSPADM

## 2023-09-24 RX ORDER — SODIUM CHLORIDE 9 MG/ML
1000 INJECTION, SOLUTION INTRAVENOUS
Status: COMPLETED | OUTPATIENT
Start: 2023-09-24 | End: 2023-09-24

## 2023-09-24 RX ORDER — ETOMIDATE 2 MG/ML
INJECTION INTRAVENOUS
Status: DISCONTINUED | OUTPATIENT
Start: 2023-09-24 | End: 2023-09-24

## 2023-09-24 RX ORDER — MISOPROSTOL 200 UG/1
200 TABLET ORAL 3 TIMES DAILY
Status: DISCONTINUED | OUTPATIENT
Start: 2023-09-24 | End: 2023-09-25 | Stop reason: HOSPADM

## 2023-09-24 RX ORDER — LORAZEPAM 2 MG/ML
2 INJECTION INTRAMUSCULAR EVERY 4 HOURS PRN
Status: DISCONTINUED | OUTPATIENT
Start: 2023-09-24 | End: 2023-09-25 | Stop reason: HOSPADM

## 2023-09-24 RX ORDER — HYDRALAZINE HYDROCHLORIDE 25 MG/1
25 TABLET, FILM COATED ORAL 3 TIMES DAILY
Status: DISCONTINUED | OUTPATIENT
Start: 2023-09-24 | End: 2023-09-25 | Stop reason: HOSPADM

## 2023-09-24 RX ORDER — HYDRALAZINE HYDROCHLORIDE 20 MG/ML
10 INJECTION INTRAMUSCULAR; INTRAVENOUS ONCE
Status: COMPLETED | OUTPATIENT
Start: 2023-09-24 | End: 2023-09-24

## 2023-09-24 RX ORDER — PROCHLORPERAZINE EDISYLATE 5 MG/ML
5 INJECTION INTRAMUSCULAR; INTRAVENOUS EVERY 6 HOURS PRN
Status: DISCONTINUED | OUTPATIENT
Start: 2023-09-24 | End: 2023-09-24

## 2023-09-24 RX ORDER — SODIUM CHLORIDE, SODIUM LACTATE, POTASSIUM CHLORIDE, CALCIUM CHLORIDE 600; 310; 30; 20 MG/100ML; MG/100ML; MG/100ML; MG/100ML
INJECTION, SOLUTION INTRAVENOUS CONTINUOUS PRN
Status: DISCONTINUED | OUTPATIENT
Start: 2023-09-24 | End: 2023-09-24

## 2023-09-24 RX ORDER — IBUPROFEN 200 MG
16 TABLET ORAL
Status: DISCONTINUED | OUTPATIENT
Start: 2023-09-24 | End: 2023-09-25 | Stop reason: HOSPADM

## 2023-09-24 RX ORDER — GLUCAGON 1 MG
1 KIT INJECTION
Status: DISCONTINUED | OUTPATIENT
Start: 2023-09-24 | End: 2023-09-25 | Stop reason: HOSPADM

## 2023-09-24 RX ORDER — HYDRALAZINE HYDROCHLORIDE 20 MG/ML
10 INJECTION INTRAMUSCULAR; INTRAVENOUS EVERY 6 HOURS PRN
Status: DISCONTINUED | OUTPATIENT
Start: 2023-09-24 | End: 2023-09-25 | Stop reason: HOSPADM

## 2023-09-24 RX ORDER — ONDANSETRON 2 MG/ML
4 INJECTION INTRAMUSCULAR; INTRAVENOUS EVERY 8 HOURS PRN
Status: DISCONTINUED | OUTPATIENT
Start: 2023-09-24 | End: 2023-09-25 | Stop reason: HOSPADM

## 2023-09-24 RX ORDER — PANTOPRAZOLE SODIUM 40 MG/1
40 TABLET, DELAYED RELEASE ORAL 2 TIMES DAILY
Status: DISCONTINUED | OUTPATIENT
Start: 2023-09-24 | End: 2023-09-25 | Stop reason: HOSPADM

## 2023-09-24 RX ORDER — LABETALOL HYDROCHLORIDE 5 MG/ML
10 INJECTION, SOLUTION INTRAVENOUS ONCE
Status: DISCONTINUED | OUTPATIENT
Start: 2023-09-24 | End: 2023-09-24

## 2023-09-24 RX ADMIN — HYDRALAZINE HYDROCHLORIDE 25 MG: 25 TABLET, FILM COATED ORAL at 09:09

## 2023-09-24 RX ADMIN — LABETALOL HYDROCHLORIDE 10 MG: 5 INJECTION INTRAVENOUS at 08:09

## 2023-09-24 RX ADMIN — HYDRALAZINE HYDROCHLORIDE 25 MG: 25 TABLET, FILM COATED ORAL at 02:09

## 2023-09-24 RX ADMIN — MISOPROSTOL 200 MCG: 200 TABLET ORAL at 09:09

## 2023-09-24 RX ADMIN — HYDRALAZINE HYDROCHLORIDE 10 MG: 20 INJECTION, SOLUTION INTRAMUSCULAR; INTRAVENOUS at 03:09

## 2023-09-24 RX ADMIN — AMLODIPINE BESYLATE 10 MG: 10 TABLET ORAL at 10:09

## 2023-09-24 RX ADMIN — FOLIC ACID: 5 INJECTION, SOLUTION INTRAMUSCULAR; INTRAVENOUS; SUBCUTANEOUS at 08:09

## 2023-09-24 RX ADMIN — SODIUM CHLORIDE 1000 ML: 9 INJECTION, SOLUTION INTRAVENOUS at 04:09

## 2023-09-24 RX ADMIN — Medication 50 MG: at 09:09

## 2023-09-24 RX ADMIN — PANTOPRAZOLE SODIUM 40 MG: 40 TABLET, DELAYED RELEASE ORAL at 09:09

## 2023-09-24 RX ADMIN — ETOMIDATE 10 MG: 2 INJECTION, SOLUTION INTRAVENOUS at 09:09

## 2023-09-24 RX ADMIN — PANTOPRAZOLE SODIUM 80 MG: 40 INJECTION, POWDER, FOR SOLUTION INTRAVENOUS at 04:09

## 2023-09-24 RX ADMIN — ACETAMINOPHEN 650 MG: 325 TABLET ORAL at 01:09

## 2023-09-24 RX ADMIN — SODIUM CHLORIDE, SODIUM LACTATE, POTASSIUM CHLORIDE, AND CALCIUM CHLORIDE: 600; 310; 30; 20 INJECTION, SOLUTION INTRAVENOUS at 09:09

## 2023-09-24 RX ADMIN — SODIUM CHLORIDE 1000 ML: 9 INJECTION, SOLUTION INTRAVENOUS at 06:09

## 2023-09-24 RX ADMIN — PANTOPRAZOLE SODIUM 40 MG: 40 TABLET, DELAYED RELEASE ORAL at 10:09

## 2023-09-24 RX ADMIN — LIDOCAINE HYDROCHLORIDE 5 ML: 20 INJECTION, SOLUTION EPIDURAL; INFILTRATION; INTRACAUDAL; PERINEURAL at 09:09

## 2023-09-24 RX ADMIN — MISOPROSTOL 200 MCG: 200 TABLET ORAL at 02:09

## 2023-09-24 RX ADMIN — OCTREOTIDE ACETATE 50 MCG: 50 INJECTION, SOLUTION INTRAVENOUS; SUBCUTANEOUS at 05:09

## 2023-09-24 RX ADMIN — Medication 80 MG: at 09:09

## 2023-09-24 NOTE — ED PROVIDER NOTES
SCRIBE #1 NOTE: I, Cornelio Pearce, am scribing for, and in the presence of, Karthik Garza Jr., MD. I have scribed the entire note.       History     Chief Complaint   Patient presents with    Vomiting     Pt c/o multiple vomiting episodes onset of yesterday morning. Pt states that after vomiting multiple times he then started vomiting coffee ground emesis, now pt says vomit is more blood tinged along with bright red blood in stool. Denies pain at this time      Review of patient's allergies indicates:  No Known Allergies      History of Present Illness     HPI    9/24/2023, 4:07 AM  History obtained from the patient      History of Present Illness: Grant Rodriguez is a 53 y.o. male patient with a PMHx of HTN, hypogonadism, IGT, obesity, and prolactinoma who presents to the Emergency Department for evaluation of hematemesis which onset yesterday morning. Pt says that he started having coffee ground emesis which turned blood-tinged PTA. He threw up blood-tinged emesis in triage. Symptoms are constant and moderate in severity. No mitigating or exacerbating factors reported. Associated sxs include hematochezia and nausea. Patient denies any pleurisy, abd pain, CP, myalgias, and all other sxs at this time. No prior Tx. Pt says that he has no hx of liver or kidney disease. He smokes cigarettes and marijuana and drinks EtOH regularly, reportedly drinking 1 pint 2 days PTA. Pt had a colonoscopy 2 years ago with some polyps noted. He has a maternal hx of ulcers. He denies using any NSAIDs recently. No further complaints or concerns at this time.       Arrival mode: Personal vehicle      PCP: Ed Banks MD        Past Medical History:  Past Medical History:   Diagnosis Date    Hypertensive urgency 12/30/2019    Hypogonadism, male     IGT (impaired glucose tolerance)     Obesity     Prolactinoma        Past Surgical History:  Past Surgical History:   Procedure Laterality Date    COLONOSCOPY N/A 12/10/2021     Procedure: COLONOSCOPY;  Surgeon: Nicole Ferraro MD;  Location: [facility];  Service: Endoscopy;  Laterality: N/A;         Family History:  Family History   Problem Relation Age of Onset    Diabetes Father     Hypertension Father     Thyroid disease Neg Hx     Calcium disorder Neg Hx        Social History:  Social History     Tobacco Use    Smoking status: Every Day     Current packs/day: 1.00     Average packs/day: 1 pack/day for 20.0 years (20.0 ttl pk-yrs)     Types: Cigarettes    Smokeless tobacco: Never   Substance and Sexual Activity    Alcohol use: Yes     Comment: 3-5 days vodka    Drug use: Yes     Frequency: 3.0 times per week     Types: Marijuana    Sexual activity: Yes     Partners: Female        Review of Systems     Review of Systems   Constitutional:  Negative for fever.   HENT:  Negative for sore throat.    Respiratory:  Negative for shortness of breath.         (-) Pleurisy   Cardiovascular:  Negative for chest pain.   Gastrointestinal:  Positive for blood in stool and nausea. Negative for abdominal pain.        (+) Hematemesis   Genitourinary:  Negative for dysuria.   Musculoskeletal:  Negative for back pain and myalgias.   Skin:  Negative for rash.   Neurological:  Negative for weakness.   Hematological:  Does not bruise/bleed easily.   All other systems reviewed and are negative.     Physical Exam     Initial Vitals [09/24/23 0332]   BP Pulse Resp Temp SpO2   (!) 160/88 103 18 99.3 °F (37.4 °C) 98 %      MAP       --          Physical Exam  Nursing Notes and Vital Signs Reviewed.  Constitutional: Patient is in no acute distress. Well-developed and well-nourished.  Head: Atraumatic. Normocephalic.  Eyes: PERRL. EOM intact. Conjunctivae are not pale. No scleral icterus.  ENT: Mucous membranes are moist. Oropharynx is clear and symmetric.    Neck: Supple. Full ROM. No lymphadenopathy.  Cardiovascular: Regular rate. Regular rhythm. No murmurs, rubs, or gallops. Distal pulses are 2+ and  symmetric.  Pulmonary/Chest: No respiratory distress. Clear to auscultation bilaterally. No wheezing or rales.  Abdominal: Soft and non-distended.  There is no tenderness.  No rebound, guarding, or rigidity. Good bowel sounds.  Genitourinary: No CVA tenderness  Rectal: Male chaperone present for the duration of the rectal exam.There is no tenderness. No masses or hemorrhoids. Normal sphincter tone. Musculoskeletal: Moves all extremities. No obvious deformities. No edema. No calf tenderness.  Skin: Warm and dry.  Neurological:  Alert, awake, and appropriate.  Normal speech.  No acute focal neurological deficits are appreciated.  Psychiatric: Normal affect. Good eye contact. Appropriate in content.     ED Course   Critical Care    Date/Time: 10/24/2023 6:31 AM    Performed by: Karthik Garza Jr., MD  Authorized by: Karthik Garza Jr., MD  Direct patient critical care time: 10 minutes  Additional history critical care time: 10 minutes  Ordering / reviewing critical care time: 10 minutes  Documentation critical care time: 10 minutes  Consulting other physicians critical care time: 10 minutes  Consult with family critical care time: 10 minutes  Other critical care time: 15 minutes  Total critical care time (exclusive of procedural time) : 75 minutes  Critical care time was exclusive of separately billable procedures and treating other patients and teaching time.  Critical care was necessary to treat or prevent imminent or life-threatening deterioration of the following conditions: circulatory failure and cardiac failure (htn).  Critical care was time spent personally by me on the following activities: blood draw for specimens, development of treatment plan with patient or surrogate, interpretation of cardiac output measurements, evaluation of patient's response to treatment, examination of patient, obtaining history from patient or surrogate, ordering and performing treatments and interventions, ordering and review of  laboratory studies, ordering and review of radiographic studies, pulse oximetry, re-evaluation of patient's condition, review of old charts and discussions with consultants.        ED Vital Signs:  Vitals:    09/24/23 0900 09/24/23 0940 09/24/23 0946 09/24/23 0951   BP: (!) 176/85 (!) 107/58 (!) 112/57 (!) 118/57   Pulse: 68 83 87 64   Resp: 18 18 18 18   Temp:       TempSrc:       SpO2: 97% 98% 99% 95%   Weight:       Height:        09/24/23 0958 09/24/23 1125 09/24/23 1320 09/24/23 1511   BP: (!) 136/59   (!) 181/86   Pulse: 60 (!) 58 (!) 59 (!) 58   Resp: 18   18   Temp:    98.3 °F (36.8 °C)   TempSrc:       SpO2: 96%      Weight:       Height:        09/24/23 1513 09/24/23 1516 09/24/23 1523 09/24/23 1612   BP:  (!) 173/80 (!) 163/79 (!) 144/92   Pulse: (!) 55  60    Resp:       Temp:       TempSrc:       SpO2:       Weight:       Height:        09/24/23 1707 09/24/23 1956 09/24/23 2018   BP:  (!) 140/76    Pulse: (!) 58 81 83   Resp:  14 16   Temp:  97.8 °F (36.6 °C)    TempSrc:  Oral    SpO2:  99% 98%   Weight:      Height:          Abnormal Lab Results:  Labs Reviewed   CBC W/ AUTO DIFFERENTIAL - Abnormal; Notable for the following components:       Result Value    WBC 12.83 (*)     Gran # (ANC) 10.7 (*)     Gran % 83.5 (*)     Lymph % 11.5 (*)     All other components within normal limits   COMPREHENSIVE METABOLIC PANEL - Abnormal; Notable for the following components:    Glucose 162 (*)     Creatinine 1.5 (*)     Total Protein 8.6 (*)     eGFR 55 (*)     All other components within normal limits   URINALYSIS - Abnormal; Notable for the following components:    Protein, UA 1+ (*)     Occult Blood UA 1+ (*)     All other components within normal limits    Narrative:     In and Out Cath as needed it patient unable to void   URINALYSIS MICROSCOPIC - Abnormal; Notable for the following components:    RBC, UA 5 (*)     All other components within normal limits    Narrative:     In and Out Cath as needed it  patient unable to void   PROTIME-INR   OCCULT BLOOD X 1, STOOL   LIPASE   TROPONIN I   LIPASE   TROPONIN I   ALCOHOL,MEDICAL (ETHANOL)   TYPE & SCREEN        All Lab Results:  Results for orders placed or performed during the hospital encounter of 09/24/23   CBC auto differential   Result Value Ref Range    WBC 12.83 (H) 3.90 - 12.70 K/uL    RBC 5.80 4.60 - 6.20 M/uL    Hemoglobin 17.5 14.0 - 18.0 g/dL    Hematocrit 49.6 40.0 - 54.0 %    MCV 86 82 - 98 fL    MCH 30.2 27.0 - 31.0 pg    MCHC 35.3 32.0 - 36.0 g/dL    RDW 12.6 11.5 - 14.5 %    Platelets 275 150 - 450 K/uL    MPV 9.7 9.2 - 12.9 fL    Immature Granulocytes 0.3 0.0 - 0.5 %    Gran # (ANC) 10.7 (H) 1.8 - 7.7 K/uL    Immature Grans (Abs) 0.04 0.00 - 0.04 K/uL    Lymph # 1.5 1.0 - 4.8 K/uL    Mono # 0.6 0.3 - 1.0 K/uL    Eos # 0.0 0.0 - 0.5 K/uL    Baso # 0.04 0.00 - 0.20 K/uL    nRBC 0 0 /100 WBC    Gran % 83.5 (H) 38.0 - 73.0 %    Lymph % 11.5 (L) 18.0 - 48.0 %    Mono % 4.4 4.0 - 15.0 %    Eosinophil % 0.0 0.0 - 8.0 %    Basophil % 0.3 0.0 - 1.9 %    Differential Method Automated    Comprehensive metabolic panel   Result Value Ref Range    Sodium 137 136 - 145 mmol/L    Potassium 3.5 3.5 - 5.1 mmol/L    Chloride 98 95 - 110 mmol/L    CO2 25 23 - 29 mmol/L    Glucose 162 (H) 70 - 110 mg/dL    BUN 15 6 - 20 mg/dL    Creatinine 1.5 (H) 0.5 - 1.4 mg/dL    Calcium 10.1 8.7 - 10.5 mg/dL    Total Protein 8.6 (H) 6.0 - 8.4 g/dL    Albumin 3.9 3.5 - 5.2 g/dL    Total Bilirubin 0.7 0.1 - 1.0 mg/dL    Alkaline Phosphatase 97 55 - 135 U/L    AST 13 10 - 40 U/L    ALT 19 10 - 44 U/L    eGFR 55 (A) >60 mL/min/1.73 m^2    Anion Gap 14 8 - 16 mmol/L   Protime-INR   Result Value Ref Range    Prothrombin Time 11.2 9.0 - 12.5 sec    INR 1.1 0.8 - 1.2   Urinalysis   Result Value Ref Range    Specimen UA Urine, Clean Catch     Color, UA Yellow Yellow, Straw, Angie    Appearance, UA Clear Clear    pH, UA 6.0 5.0 - 8.0    Specific Gravity, UA 1.025 1.005 - 1.030    Protein, UA  1+ (A) Negative    Glucose, UA Negative Negative    Ketones, UA Negative Negative    Bilirubin (UA) Negative Negative    Occult Blood UA 1+ (A) Negative    Nitrite, UA Negative Negative    Urobilinogen, UA Negative <2.0 EU/dL    Leukocytes, UA Negative Negative   Occult blood x 1, stool    Specimen: Stool   Result Value Ref Range    Occult Blood Negative Negative   Urinalysis Microscopic   Result Value Ref Range    RBC, UA 5 (H) 0 - 4 /hpf    WBC, UA 1 0 - 5 /hpf    Bacteria None None-Occ /hpf    Squam Epithel, UA 0 /hpf    Hyaline Casts, UA 0 0-1/lpf /lpf    Microscopic Comment SEE COMMENT    Lipase   Result Value Ref Range    Lipase 50 4 - 60 U/L   Troponin I   Result Value Ref Range    Troponin I 0.007 0.000 - 0.026 ng/mL   CBC Auto Differential   Result Value Ref Range    WBC 13.52 (H) 3.90 - 12.70 K/uL    RBC 5.58 4.60 - 6.20 M/uL    Hemoglobin 16.6 14.0 - 18.0 g/dL    Hematocrit 48.6 40.0 - 54.0 %    MCV 87 82 - 98 fL    MCH 29.7 27.0 - 31.0 pg    MCHC 34.2 32.0 - 36.0 g/dL    RDW 12.6 11.5 - 14.5 %    Platelets 273 150 - 450 K/uL    MPV 9.9 9.2 - 12.9 fL    Immature Granulocytes 0.4 0.0 - 0.5 %    Gran # (ANC) 11.2 (H) 1.8 - 7.7 K/uL    Immature Grans (Abs) 0.05 (H) 0.00 - 0.04 K/uL    Lymph # 1.7 1.0 - 4.8 K/uL    Mono # 0.6 0.3 - 1.0 K/uL    Eos # 0.0 0.0 - 0.5 K/uL    Baso # 0.03 0.00 - 0.20 K/uL    nRBC 0 0 /100 WBC    Gran % 82.5 (H) 38.0 - 73.0 %    Lymph % 12.7 (L) 18.0 - 48.0 %    Mono % 4.2 4.0 - 15.0 %    Eosinophil % 0.0 0.0 - 8.0 %    Basophil % 0.2 0.0 - 1.9 %    Differential Method Automated    Comprehensive Metabolic Panel   Result Value Ref Range    Sodium 139 136 - 145 mmol/L    Potassium 4.2 3.5 - 5.1 mmol/L    Chloride 98 95 - 110 mmol/L    CO2 26 23 - 29 mmol/L    Glucose 170 (H) 70 - 110 mg/dL    BUN 14 6 - 20 mg/dL    Creatinine 1.5 (H) 0.5 - 1.4 mg/dL    Calcium 9.6 8.7 - 10.5 mg/dL    Total Protein 8.1 6.0 - 8.4 g/dL    Albumin 3.7 3.5 - 5.2 g/dL    Total Bilirubin 0.8 0.1 - 1.0 mg/dL     Alkaline Phosphatase 90 55 - 135 U/L    AST 14 10 - 40 U/L    ALT 17 10 - 44 U/L    eGFR 55 (A) >60 mL/min/1.73 m^2    Anion Gap 15 8 - 16 mmol/L   Troponin I   Result Value Ref Range    Troponin I 0.017 0.000 - 0.026 ng/mL   Ethanol   Result Value Ref Range    Alcohol, Serum <10 <10 mg/dL   Hemoglobin A1c   Result Value Ref Range    Hemoglobin A1C 6.3 (H) 4.0 - 5.6 %    Estimated Avg Glucose 134 (H) 68 - 131 mg/dL   Hemoglobin   Result Value Ref Range    Hemoglobin 16.2 14.0 - 18.0 g/dL   Hematocrit   Result Value Ref Range    Hematocrit 46.2 40.0 - 54.0 %   Type & Screen   Result Value Ref Range    Group & Rh O POS     Indirect Teo NEG     Specimen Outdate 09/27/2023 23:59         Imaging Results:  Imaging Results              X-Ray Abdomen Flat And Erect (Final result)  Result time 09/24/23 07:53:18      Final result by Griffin Gasca MD (09/24/23 07:53:18)                   Impression:      As above.      Electronically signed by: Griffin Gasca  Date:    09/24/2023  Time:    07:53               Narrative:    EXAMINATION:  XR ABDOMEN FLAT AND ERECT    CLINICAL HISTORY:  GI Bleeding;    TECHNIQUE:  Flat and erect AP views of the abdomen were performed.    COMPARISON:  None    FINDINGS:  Nonobstructive bowel gas pattern.  No convincing evidence of free air.  Questionable postoperative changes in the left upper quadrant.  No acute findings evident.                        Wet Read by Karthik Garza Jr., MD (09/24/23 05:09:02, [facility] - Emergency Dept., Emergency Medicine)    naf                                     X-Ray Chest AP Portable (Final result)  Result time 09/24/23 07:51:27      Final result by Griffin Gasca MD (09/24/23 07:51:27)                   Impression:      No acute abnormality.      Electronically signed by: Griffin Gasca  Date:    09/24/2023  Time:    07:51               Narrative:    EXAMINATION:  XR CHEST AP PORTABLE    CLINICAL HISTORY:  vomiting;    TECHNIQUE:  Single frontal  view of the chest was performed.    COMPARISON:  Chest radiograph 01/20/2021    FINDINGS:  The lungs are clear, with normal appearance of pulmonary vasculature and no pleural effusion or pneumothorax.    The cardiac silhouette is normal in size. Aortic tortuosity.  Cardiomediastinal structures are otherwise unremarkable allowing for mild rotation.    Bones are intact.                        Wet Read by Karthik Garza Jr., MD (09/24/23 05:11:54, [facility] - Emergency Dept[facility] Emergency Medicine)    naf                                     The EKG was ordered, reviewed, and independently interpreted by the ED provider.  Interpretation time: 3:54  Rate: 63 BPM  Rhythm: normal sinus rhythm  Interpretation: Moderate voltage criteria for LVH, may be normal variant. No STEMI.           The Emergency Provider reviewed the vital signs and test results, which are outlined above.     ED Discussion     4:11 AM: At the end of the rectal exam, pt had a bout of non-bloody emesis.     5:58 AM: Discussed pt's case with Dr. Ferraro (Gastroenterology) who agrees with admission and keeping pt NPO.    6:09 AM: Discussed pt's case with Marva Dennis NP ([facility]) who recommends getting updated vitals and orthostatics.    6:14 AM: Discussed pt's case with Dr. Arredondo ([facility]) who recommends placing pt on the admission floor because his BP is stable and labs look fine. Dr. Arredondo does not think the pt needs to go to the ICU unless he had another episode of hematemesis.    6:15 AM: Discussed case with Marva Dennis NP ([facility]). Dr. Mccormick agrees with current care and management of pt and accepts admission.   Admitting Service: Hospital Medicine  Admitting Physician: Dr. Shea  Admit to: Inpatient Med/Tele    6:15 AM: Re-evaluated pt. I have discussed test results, shared treatment plan, and the need for admission with patient and family at bedside. Pt and family express understanding at this time and agree with all  information. All questions answered. Pt and family have no further questions or concerns at this time. Pt is ready for admit.         Medical Decision Making  Amount and/or Complexity of Data Reviewed  Labs: ordered. Decision-making details documented in ED Course.  Radiology: ordered and independent interpretation performed. Decision-making details documented in ED Course.  ECG/medicine tests: ordered and independent interpretation performed. Decision-making details documented in ED Course.    Risk  Prescription drug management.  Decision regarding hospitalization.                ED Medication(s):  Medications   sodium chloride 0.9% flush 10 mL (has no administration in time range)   ondansetron injection 4 mg (has no administration in time range)   naloxone 0.4 mg/mL injection 0.02 mg (has no administration in time range)   glucose chewable tablet 16 g (has no administration in time range)   glucose chewable tablet 24 g (has no administration in time range)   glucagon (human recombinant) injection 1 mg (has no administration in time range)   dextrose 10% bolus 125 mL 125 mL (has no administration in time range)   dextrose 10% bolus 250 mL 250 mL (has no administration in time range)   hydrALAZINE injection 10 mg (10 mg Intravenous Not Given 9/24/23 1513)   labetaloL injection 10 mg (10 mg Intravenous Given 9/24/23 0829)   LORazepam injection 2 mg (has no administration in time range)   pantoprazole EC tablet 40 mg (40 mg Oral Given 9/24/23 1050)   miSOPROStoL tablet 200 mcg (200 mcg Oral Given 9/24/23 1445)   amLODIPine tablet 10 mg (10 mg Oral Given 9/24/23 1050)   hydrALAZINE tablet 25 mg (25 mg Oral Given 9/24/23 1445)   acetaminophen tablet 650 mg (650 mg Oral Given 9/24/23 1336)   chlordiazepoxide capsule 25 mg (25 mg Oral Not Given 9/24/23 1330)   sodium chloride 0.9% bolus 1,000 mL 1,000 mL (0 mLs Intravenous Stopped 9/24/23 0525)   octreotide injection 50 mcg (50 mcg Intravenous Given 9/24/23 0520)    pantoprazole injection 80 mg (80 mg Intravenous Given 9/24/23 7835)   0.9%  NaCl infusion (1,000 mLs Intravenous New Bag 9/24/23 0651)   sodium chloride 0.9% 1,000 mL with mvi, (ADULT) no.4 with vit K 3,300 unit- 150 mcg/10 mL 10 mL, thiamine 100 mg, folic acid 1 mg infusion ( Intravenous Stopped 9/24/23 1029)   hydrALAZINE injection 10 mg (10 mg Intravenous Given 9/24/23 1517)       Current Discharge Medication List                  Scribe Attestation:   Scribe #1: I performed the above scribed service and the documentation accurately describes the services I performed. I attest to the accuracy of the note.     Attending:   Physician Attestation Statement for Scribe #1: I, Karthik Garza Jr., MD, personally performed the services described in this documentation, as scribed by Cornelio Pearce, in my presence, and it is both accurate and complete.           Clinical Impression       ICD-10-CM ICD-9-CM   1. Gastrointestinal hemorrhage, unspecified gastrointestinal hemorrhage type  K92.2 578.9   2. Vomiting, unspecified  R11.10 787.03   3. Emesis  R11.10 787.03   4. Chest pain  R07.9 786.50   5. Multiple gastric ulcers  K25.9 531.90   6. Hiatal hernia  K44.9 553.3   7. Gastrointestinal hemorrhage associated with gastric ulcer  K25.4 531.40       Disposition:   Disposition: Admitted  Condition: Fair         Karthik Garza Jr., MD  09/24/23 2049       Karthik Garza Jr., MD  10/25/23 0132

## 2023-09-24 NOTE — ASSESSMENT & PLAN NOTE
Coffee ground emesis reported PTA, blood tinged emesis witnessed in ED. Labs stable, vitals stable on arrival. Hgb: 17.5 on arrival.     --Repeat CBC  --GI consulted  --NPO until EGD, pre GI recs initiate Clear Liquid diet, advance as tolerated  --EGD per GI- gastric ulcers noted on scope  --Antiemetics PRN per MAR  --Octreotide per GI recs

## 2023-09-24 NOTE — PLAN OF CARE
Report given to Marge.Vital signs stable. No acute distress noted. Pt with no complaints of pain. Transfer back to room 224 via wheelchair

## 2023-09-24 NOTE — ASSESSMENT & PLAN NOTE
Will proceed with an EGD today. Risks, benefits and alternative options were discussed with the patient. Risks including but not limited to infection, bleeding, heart or respiratory problems and perforation that may require surgery were all explained to the patient. The patient had a chance for questions if there were doubts or concerns about the test. The referring provider will be notified that our consultation is complete and available through the patient's records.    Thanks for letting us participate in the care of this nice patient,      Parker Dubose

## 2023-09-24 NOTE — SUBJECTIVE & OBJECTIVE
Past Medical History:   Diagnosis Date    Hypertensive urgency 12/30/2019    Hypogonadism, male     IGT (impaired glucose tolerance)     Obesity     Prolactinoma        Past Surgical History:   Procedure Laterality Date    COLONOSCOPY N/A 12/10/2021    Procedure: COLONOSCOPY;  Surgeon: Nicole Ferraro MD;  Location: [facility];  Service: Endoscopy;  Laterality: N/A;       Review of patient's allergies indicates:  No Known Allergies  Family History       Problem Relation (Age of Onset)    Diabetes Father    Hypertension Father          Tobacco Use    Smoking status: Every Day     Current packs/day: 1.00     Average packs/day: 1 pack/day for 20.0 years (20.0 ttl pk-yrs)     Types: Cigarettes    Smokeless tobacco: Never   Substance and Sexual Activity    Alcohol use: Yes     Comment: 3-5 days vodka    Drug use: Yes     Frequency: 3.0 times per week     Types: Marijuana    Sexual activity: Yes     Partners: Female     Review of Systems   Constitutional:  Positive for appetite change and fatigue. Negative for activity change, fever and unexpected weight change.   HENT:  Negative for congestion, ear pain, hearing loss, mouth sores, trouble swallowing and voice change.    Eyes:  Negative for pain, redness and itching.   Respiratory:  Positive for shortness of breath. Negative for apnea, cough, choking, chest tightness and wheezing.    Cardiovascular:  Negative for chest pain, palpitations and leg swelling.   Gastrointestinal:  Positive for blood in stool, nausea and vomiting. Negative for abdominal distention, abdominal pain, anal bleeding, constipation and diarrhea.   Endocrine: Negative for cold intolerance, heat intolerance and polyphagia.   Genitourinary:  Negative for dysuria, hematuria and urgency.   Musculoskeletal:  Negative for arthralgias, joint swelling and neck pain.   Skin:  Negative for pallor and rash.   Allergic/Immunologic: Negative for environmental allergies and food allergies.   Neurological:   Negative for dizziness, facial asymmetry and light-headedness.   Hematological:  Negative for adenopathy. Does not bruise/bleed easily.   Psychiatric/Behavioral:  Negative for agitation, behavioral problems, confusion and decreased concentration.      Objective:     Vital Signs (Most Recent):  Temp: 99.1 °F (37.3 °C) (09/24/23 0825)  Pulse: 68 (09/24/23 0900)  Resp: 18 (09/24/23 0900)  BP: (!) 176/85 (09/24/23 0900)  SpO2: 97 % (09/24/23 0900) Vital Signs (24h Range):  Temp:  [98.2 °F (36.8 °C)-99.3 °F (37.4 °C)] 99.1 °F (37.3 °C)  Pulse:  [] 68  Resp:  [11-21] 18  SpO2:  [95 %-100 %] 97 %  BP: (160-183)/() 176/85     Weight: 116.6 kg (256 lb 15.1 oz) (09/24/23 0332)  Body mass index is 37.94 kg/m².      Intake/Output Summary (Last 24 hours) at 9/24/2023 0904  Last data filed at 9/24/2023 0525  Gross per 24 hour   Intake 999 ml   Output --   Net 999 ml       Lines/Drains/Airways       Peripheral Intravenous Line  Duration                  Peripheral IV - Single Lumen 09/24/23 0353 20 G Left Antecubital <1 day                     Physical Exam  Vitals and nursing note reviewed.   Constitutional:       Appearance: He is well-developed.   HENT:      Head: Normocephalic and atraumatic.   Eyes:      Conjunctiva/sclera: Conjunctivae normal.      Pupils: Pupils are equal, round, and reactive to light.   Neck:      Thyroid: No thyromegaly.      Trachea: No tracheal deviation.   Cardiovascular:      Rate and Rhythm: Normal rate and regular rhythm.   Pulmonary:      Effort: Pulmonary effort is normal.      Breath sounds: Normal breath sounds. No wheezing or rales.   Chest:      Chest wall: No tenderness.   Abdominal:      General: Bowel sounds are normal. There is no distension.      Palpations: Abdomen is soft. There is no mass.      Tenderness: There is no abdominal tenderness. There is no guarding.   Musculoskeletal:         General: Normal range of motion.      Cervical back: Normal range of motion and neck  supple.   Lymphadenopathy:      Cervical: No cervical adenopathy.   Skin:     General: Skin is warm and dry.   Neurological:      Mental Status: He is alert and oriented to person, place, and time.      Cranial Nerves: No cranial nerve deficit.   Psychiatric:         Behavior: Behavior normal.          Significant Labs:  All pertinent lab results from the last 24 hours have been reviewed.  Recent Lab Results         09/24/23  0816   09/24/23  0522   09/24/23  0409   09/24/23  0357        Albumin 3.7       3.9       Alcohol, Serum <10             Alkaline Phosphatase 90       97       ALT 17       19       Anion Gap 15       14       Appearance, UA   Clear           AST 14       13       Bacteria, UA   None           Baso # 0.03       0.04       Basophil % 0.2       0.3       Bilirubin (UA)   Negative           BILIRUBIN TOTAL 0.8  Comment: For infants and newborns, interpretation of results should be based  on gestational age, weight and in agreement with clinical  observations.    Premature Infant recommended reference ranges:  Up to 24 hours.............<8.0 mg/dL  Up to 48 hours............<12.0 mg/dL  3-5 days..................<15.0 mg/dL  6-29 days.................<15.0 mg/dL         0.7  Comment: For infants and newborns, interpretation of results should be based  on gestational age, weight and in agreement with clinical  observations.    Premature Infant recommended reference ranges:  Up to 24 hours.............<8.0 mg/dL  Up to 48 hours............<12.0 mg/dL  3-5 days..................<15.0 mg/dL  6-29 days.................<15.0 mg/dL         BUN 14       15       Calcium 9.6       10.1       Chloride 98       98       CO2 26       25       Color, UA   Yellow           Creatinine 1.5       1.5       Differential Method Automated       Automated       eGFR 55       55       Eos # 0.0       0.0       Eosinophil % 0.0       0.0       Glucose 170       162       Glucose, UA   Negative           Gran # (ANC)  11.2       10.7       Gran % 82.5       83.5       Group & Rh       O POS       Hematocrit 48.6       49.6       Hemoglobin 16.6       17.5       Hyaline Casts, UA   0           Immature Grans (Abs) 0.05  Comment: Mild elevation in immature granulocytes is non specific and   can be seen in a variety of conditions including stress response,   acute inflammation, trauma and pregnancy. Correlation with other   laboratory and clinical findings is essential.         0.04  Comment: Mild elevation in immature granulocytes is non specific and   can be seen in a variety of conditions including stress response,   acute inflammation, trauma and pregnancy. Correlation with other   laboratory and clinical findings is essential.         Immature Granulocytes 0.4       0.3       INDIRECT DARON       NEG       INR       1.1  Comment: Coumadin Therapy:  2.0 - 3.0 for INR for all indicators except mechanical heart valves  and antiphospholipid syndromes which should use 2.5 - 3.5.         Ketones, UA   Negative           Leukocytes, UA   Negative           Lipase       50       Lymph # 1.7       1.5       Lymph % 12.7       11.5       MCH 29.7       30.2       MCHC 34.2       35.3       MCV 87       86       Microscopic Comment   SEE COMMENT  Comment: Other formed elements not mentioned in the report are not   present in the microscopic examination.              Mono # 0.6       0.6       Mono % 4.2       4.4       MPV 9.9       9.7       NITRITE UA   Negative           nRBC 0       0       Occult Blood     Negative         Occult Blood UA   1+           pH, UA   6.0           Platelets 273       275       Potassium 4.2       3.5       PROTEIN TOTAL 8.1       8.6       Protein, UA   1+  Comment: Recommend a 24 hour urine protein or a urine   protein/creatinine ratio if globulin induced proteinuria is  clinically suspected.             Protime       11.2       RBC 5.58       5.80       RBC, UA   5           RDW 12.6       12.6        Sodium 139       137       Specific [place], UA   1.025           Specimen Outdate       09/27/2023 23:59       Specimen UA   Urine, Clean Catch           Squam Epithel, UA   0           Troponin I 0.017  Comment: The reference interval for Troponin I represents the 99th percentile   cutoff   for our facility and is consistent with 3rd generation assay   performance.         0.007  Comment: The reference interval for Troponin I represents the 99th percentile   cutoff   for our facility and is consistent with 3rd generation assay   performance.         UROBILINOGEN UA   Negative           WBC, UA   1           WBC 13.52       12.83               Significant Imaging:  Abdominal Film: I have reviewed all results within the past 24 hours and my personal findings are:  negative.

## 2023-09-24 NOTE — ASSESSMENT & PLAN NOTE
Reported significant ETOH use    --Banana Bag  --CIWA Q4H  --Ativan PRN for tremors, CIWA >8  --ETOH level: pending

## 2023-09-24 NOTE — SUBJECTIVE & OBJECTIVE
Past Medical History:   Diagnosis Date    Hypertensive urgency 12/30/2019    Hypogonadism, male     IGT (impaired glucose tolerance)     Obesity     Prolactinoma        Past Surgical History:   Procedure Laterality Date    COLONOSCOPY N/A 12/10/2021    Procedure: COLONOSCOPY;  Surgeon: Nicole Ferraro MD;  Location: [facility];  Service: Endoscopy;  Laterality: N/A;       Review of patient's allergies indicates:  No Known Allergies    Current Facility-Administered Medications on File Prior to Encounter   Medication    lactated ringers infusion     Current Outpatient Medications on File Prior to Encounter   Medication Sig    amLODIPine (NORVASC) 10 MG tablet Take 1 tablet (10 mg total) by mouth once daily.    cabergoline (DOSTINEX) 0.5 mg tablet TAKE 2 TABLETS BY MOUTH ON MONDAY AND FRIDAY, AND TAKE 1 TABLET ON WEDNESDAY    hydrALAZINE (APRESOLINE) 25 MG tablet TAKE 1 TABLET BY MOUTH THREE TIMES DAILY    hydroCHLOROthiazide (HYDRODIURIL) 25 MG tablet Take 1 tablet by mouth once daily    potassium chloride SA (K-DUR,KLOR-CON M) 10 MEQ tablet Take 2 tablets (20 mEq total) by mouth once daily.    sildenafil (REVATIO) 20 mg Tab Take 1-5 tablets as needed on an empty stomach with no alcohol an hour before desiring an erection.    testosterone cypionate (DEPOTESTOTERONE CYPIONATE) 200 mg/mL injection Inject 1 mL (200 mg total) into the muscle every 21 days. INJECT 1ML BY INTRAMUSCULAR ROUTE EVERY 3 WEEKS Strength: 200 mg/mL     Family History       Problem Relation (Age of Onset)    Diabetes Father    Hypertension Father          Tobacco Use    Smoking status: Every Day     Current packs/day: 1.00     Average packs/day: 1 pack/day for 20.0 years (20.0 ttl pk-yrs)     Types: Cigarettes    Smokeless tobacco: Never   Substance and Sexual Activity    Alcohol use: Yes     Comment: 3-5 days vodka    Drug use: Yes     Frequency: 3.0 times per week     Types: Marijuana    Sexual activity: Yes     Partners: Female     Review of  Systems   Gastrointestinal:  Positive for abdominal pain and vomiting.   All other systems reviewed and are negative.    Objective:     Vital Signs (Most Recent):  Temp: 99.1 °F (37.3 °C) (09/24/23 0825)  Pulse: 60 (09/24/23 0958)  Resp: 18 (09/24/23 0958)  BP: (!) 136/59 (09/24/23 0958)  SpO2: 96 % (09/24/23 0958) Vital Signs (24h Range):  Temp:  [98.2 °F (36.8 °C)-99.3 °F (37.4 °C)] 99.1 °F (37.3 °C)  Pulse:  [] 60  Resp:  [11-21] 18  SpO2:  [95 %-100 %] 96 %  BP: (107-183)/() 136/59     Weight: 118.8 kg (261 lb 14.5 oz)  Body mass index is 38.68 kg/m².     Physical Exam  Vitals and nursing note reviewed.   Constitutional:       General: He is not in acute distress.     Appearance: He is well-developed. He is not diaphoretic.   HENT:      Head: Normocephalic and atraumatic.      Right Ear: Hearing and external ear normal.      Left Ear: Hearing and external ear normal.      Nose: Nose normal. No mucosal edema or rhinorrhea.      Mouth/Throat:      Pharynx: Uvula midline.   Eyes:      General:         Right eye: No discharge.         Left eye: No discharge.      Conjunctiva/sclera: Conjunctivae normal.      Right eye: No chemosis.     Left eye: No chemosis.     Pupils: Pupils are equal, round, and reactive to light.   Neck:      Thyroid: No thyroid mass or thyromegaly.      Trachea: Trachea normal.   Cardiovascular:      Rate and Rhythm: Normal rate and regular rhythm.      Pulses:           Dorsalis pedis pulses are 2+ on the right side and 2+ on the left side.      Heart sounds: Normal heart sounds. No murmur heard.  Pulmonary:      Effort: Pulmonary effort is normal. No respiratory distress.      Breath sounds: Normal breath sounds. No decreased breath sounds or wheezing.   Abdominal:      General: Bowel sounds are normal. There is no distension.      Palpations: Abdomen is soft.      Tenderness: There is no abdominal tenderness.   Musculoskeletal:         General: Normal range of motion.       Cervical back: Normal range of motion and neck supple.   Lymphadenopathy:      Cervical: No cervical adenopathy.      Upper Body:      Right upper body: No supraclavicular adenopathy.      Left upper body: No supraclavicular adenopathy.   Skin:     General: Skin is warm and dry.      Capillary Refill: Capillary refill takes less than 2 seconds.      Findings: No rash.   Neurological:      Mental Status: He is alert and oriented to person, place, and time.   Psychiatric:         Mood and Affect: Mood is not anxious.         Speech: Speech normal.         Behavior: Behavior normal.         Thought Content: Thought content normal.         Judgment: Judgment normal.              CRANIAL NERVES     CN III, IV, VI   Pupils are equal, round, and reactive to light.       Significant Labs: All pertinent labs within the past 24 hours have been reviewed.  CBC:   Recent Labs   Lab 09/24/23  0357 09/24/23  0816   WBC 12.83* 13.52*   HGB 17.5 16.6   HCT 49.6 48.6    273     CMP:   Recent Labs   Lab 09/24/23  0357 09/24/23  0816    139   K 3.5 4.2   CL 98 98   CO2 25 26   * 170*   BUN 15 14   CREATININE 1.5* 1.5*   CALCIUM 10.1 9.6   PROT 8.6* 8.1   ALBUMIN 3.9 3.7   BILITOT 0.7 0.8   ALKPHOS 97 90   AST 13 14   ALT 19 17   ANIONGAP 14 15       Significant Imaging: I have reviewed all pertinent imaging results/findings within the past 24 hours.

## 2023-09-24 NOTE — ASSESSMENT & PLAN NOTE
Patient has a current diagnosis of hypertensive urgency (without evidence of end organ damage) which is uncontrolled.  Latest blood pressure and vitals reviewed-   Temp:  [99.3 °F (37.4 °C)]   Pulse:  []   Resp:  [11-21]   BP: (160-178)/()   SpO2:  [95 %-100 %] .   Patient currently on IV antihypertensives.   Home meds for hypertension were reviewed and noted below.   Hypertension Medications             amLODIPine (NORVASC) 10 MG tablet Take 1 tablet (10 mg total) by mouth once daily.    hydrALAZINE (APRESOLINE) 25 MG tablet TAKE 1 TABLET BY MOUTH THREE TIMES DAILY    hydroCHLOROthiazide (HYDRODIURIL) 25 MG tablet Take 1 tablet by mouth once daily          Medication adjustment for hospital antihypertensives is as follows- Hydralazine IV PRN, Labetolol IV PRN, hold PO meds until cleared per GI    Will aim for controlled BP reduction by medications noted above. Monitor and mitigate end organ damage as indicated.

## 2023-09-24 NOTE — CONSULTS
[facility] ([facility])  Gastroenterology  Consult Note    Patient Name: Grant Rodriguez  MRN: [id number]  Admission Date: 9/24/2023  Hospital Length of Stay: 0 days  Code Status: Full Code   Attending Provider: Katerine Braden MD   Consulting Provider: Parker Dubose MD  Primary Care Physician: Ed Banks MD  Principal Problem:Gastrointestinal hemorrhage    Inpatient consult to Gastroenterology  Consult performed by: Parker Dubose MD  Consult ordered by: Karthik Garza Jr., MD  Reason for consult: Hematemesis        Subjective:     HPI:   GI Bleeding: Patient complains of hematemesis. Symptoms have been present for approximately 1 day. The symptoms are stable. This has been associated with abdominal bloating, belching and eructation, heartburn, and nausea.  He denies melena and unexpected weight loss.  He does not have used nonsteroidal anti-inflammatory drugs on a regular bases; he is not anticoagulated.  The patient currently denies significant abdominal pain or discomfort.  There is not a past history of gastrointestinal bleeding.   HPI     Vomiting     Additional comments: Pt c/o multiple vomiting episodes onset of yesterday   morning. Pt states that after vomiting multiple times he then started   vomiting coffee ground emesis, now pt says vomit is more blood tinged   along with bright red blood in stool. Denies pain at this time          Past Medical History:   Diagnosis Date    Hypertensive urgency 12/30/2019    Hypogonadism, male     IGT (impaired glucose tolerance)     Obesity     Prolactinoma        Past Surgical History:   Procedure Laterality Date    COLONOSCOPY N/A 12/10/2021    Procedure: COLONOSCOPY;  Surgeon: Nicole Ferraro MD;  Location: [facility];  Service: Endoscopy;  Laterality: N/A;       Review of patient's allergies indicates:  No Known Allergies  Family History       Problem Relation (Age of Onset)    Diabetes Father    Hypertension Father          Tobacco Use     Smoking status: Every Day     Current packs/day: 1.00     Average packs/day: 1 pack/day for 20.0 years (20.0 ttl pk-yrs)     Types: Cigarettes    Smokeless tobacco: Never   Substance and Sexual Activity    Alcohol use: Yes     Comment: 3-5 days vodka    Drug use: Yes     Frequency: 3.0 times per week     Types: Marijuana    Sexual activity: Yes     Partners: Female     Review of Systems   Constitutional:  Positive for appetite change and fatigue. Negative for activity change, fever and unexpected weight change.   HENT:  Negative for congestion, ear pain, hearing loss, mouth sores, trouble swallowing and voice change.    Eyes:  Negative for pain, redness and itching.   Respiratory:  Positive for shortness of breath. Negative for apnea, cough, choking, chest tightness and wheezing.    Cardiovascular:  Negative for chest pain, palpitations and leg swelling.   Gastrointestinal:  Positive for blood in stool, nausea and vomiting. Negative for abdominal distention, abdominal pain, anal bleeding, constipation and diarrhea.   Endocrine: Negative for cold intolerance, heat intolerance and polyphagia.   Genitourinary:  Negative for dysuria, hematuria and urgency.   Musculoskeletal:  Negative for arthralgias, joint swelling and neck pain.   Skin:  Negative for pallor and rash.   Allergic/Immunologic: Negative for environmental allergies and food allergies.   Neurological:  Negative for dizziness, facial asymmetry and light-headedness.   Hematological:  Negative for adenopathy. Does not bruise/bleed easily.   Psychiatric/Behavioral:  Negative for agitation, behavioral problems, confusion and decreased concentration.      Objective:     Vital Signs (Most Recent):  Temp: 99.1 °F (37.3 °C) (09/24/23 0825)  Pulse: 68 (09/24/23 0900)  Resp: 18 (09/24/23 0900)  BP: (!) 176/85 (09/24/23 0900)  SpO2: 97 % (09/24/23 0900) Vital Signs (24h Range):  Temp:  [98.2 °F (36.8 °C)-99.3 °F (37.4 °C)] 99.1 °F (37.3 °C)  Pulse:  []  68  Resp:  [11-21] 18  SpO2:  [95 %-100 %] 97 %  BP: (160-183)/() 176/85     Weight: 116.6 kg (256 lb 15.1 oz) (09/24/23 0332)  Body mass index is 37.94 kg/m².      Intake/Output Summary (Last 24 hours) at 9/24/2023 0904  Last data filed at 9/24/2023 0525  Gross per 24 hour   Intake 999 ml   Output --   Net 999 ml       Lines/Drains/Airways       Peripheral Intravenous Line  Duration                  Peripheral IV - Single Lumen 09/24/23 0353 20 G Left Antecubital <1 day                     Physical Exam  Vitals and nursing note reviewed.   Constitutional:       Appearance: He is well-developed.   HENT:      Head: Normocephalic and atraumatic.   Eyes:      Conjunctiva/sclera: Conjunctivae normal.      Pupils: Pupils are equal, round, and reactive to light.   Neck:      Thyroid: No thyromegaly.      Trachea: No tracheal deviation.   Cardiovascular:      Rate and Rhythm: Normal rate and regular rhythm.   Pulmonary:      Effort: Pulmonary effort is normal.      Breath sounds: Normal breath sounds. No wheezing or rales.   Chest:      Chest wall: No tenderness.   Abdominal:      General: Bowel sounds are normal. There is no distension.      Palpations: Abdomen is soft. There is no mass.      Tenderness: There is no abdominal tenderness. There is no guarding.   Musculoskeletal:         General: Normal range of motion.      Cervical back: Normal range of motion and neck supple.   Lymphadenopathy:      Cervical: No cervical adenopathy.   Skin:     General: Skin is warm and dry.   Neurological:      Mental Status: He is alert and oriented to person, place, and time.      Cranial Nerves: No cranial nerve deficit.   Psychiatric:         Behavior: Behavior normal.          Significant Labs:  All pertinent lab results from the last 24 hours have been reviewed.  Recent Lab Results         09/24/23  0816   09/24/23  0522   09/24/23  0409   09/24/23  0357        Albumin 3.7       3.9       Alcohol, Serum <10              Alkaline Phosphatase 90       97       ALT 17       19       Anion Gap 15       14       Appearance, UA   Clear           AST 14       13       Bacteria, UA   None           Baso # 0.03       0.04       Basophil % 0.2       0.3       Bilirubin (UA)   Negative           BILIRUBIN TOTAL 0.8  Comment: For infants and newborns, interpretation of results should be based  on gestational age, weight and in agreement with clinical  observations.    Premature Infant recommended reference ranges:  Up to 24 hours.............<8.0 mg/dL  Up to 48 hours............<12.0 mg/dL  3-5 days..................<15.0 mg/dL  6-29 days.................<15.0 mg/dL         0.7  Comment: For infants and newborns, interpretation of results should be based  on gestational age, weight and in agreement with clinical  observations.    Premature Infant recommended reference ranges:  Up to 24 hours.............<8.0 mg/dL  Up to 48 hours............<12.0 mg/dL  3-5 days..................<15.0 mg/dL  6-29 days.................<15.0 mg/dL         BUN 14       15       Calcium 9.6       10.1       Chloride 98       98       CO2 26       25       Color, UA   Yellow           Creatinine 1.5       1.5       Differential Method Automated       Automated       eGFR 55       55       Eos # 0.0       0.0       Eosinophil % 0.0       0.0       Glucose 170       162       Glucose, UA   Negative           Gran # (ANC) 11.2       10.7       Gran % 82.5       83.5       Group & Rh       O POS       Hematocrit 48.6       49.6       Hemoglobin 16.6       17.5       Hyaline Casts, UA   0           Immature Grans (Abs) 0.05  Comment: Mild elevation in immature granulocytes is non specific and   can be seen in a variety of conditions including stress response,   acute inflammation, trauma and pregnancy. Correlation with other   laboratory and clinical findings is essential.         0.04  Comment: Mild elevation in immature granulocytes is non specific and   can be seen  in a variety of conditions including stress response,   acute inflammation, trauma and pregnancy. Correlation with other   laboratory and clinical findings is essential.         Immature Granulocytes 0.4       0.3       INDIRECT DARON       NEG       INR       1.1  Comment: Coumadin Therapy:  2.0 - 3.0 for INR for all indicators except mechanical heart valves  and antiphospholipid syndromes which should use 2.5 - 3.5.         Ketones, UA   Negative           Leukocytes, UA   Negative           Lipase       50       Lymph # 1.7       1.5       Lymph % 12.7       11.5       MCH 29.7       30.2       MCHC 34.2       35.3       MCV 87       86       Microscopic Comment   SEE COMMENT  Comment: Other formed elements not mentioned in the report are not   present in the microscopic examination.              Mono # 0.6       0.6       Mono % 4.2       4.4       MPV 9.9       9.7       NITRITE UA   Negative           nRBC 0       0       Occult Blood     Negative         Occult Blood UA   1+           pH, UA   6.0           Platelets 273       275       Potassium 4.2       3.5       PROTEIN TOTAL 8.1       8.6       Protein, UA   1+  Comment: Recommend a 24 hour urine protein or a urine   protein/creatinine ratio if globulin induced proteinuria is  clinically suspected.             Protime       11.2       RBC 5.58       5.80       RBC, UA   5           RDW 12.6       12.6       Sodium 139       137       Specific [place], UA   1.025           Specimen Outdate       09/27/2023 23:59       Specimen UA   Urine, Clean Catch           Squam Epithel, UA   0           Troponin I 0.017  Comment: The reference interval for Troponin I represents the 99th percentile   cutoff   for our facility and is consistent with 3rd generation assay   performance.         0.007  Comment: The reference interval for Troponin I represents the 99th percentile   cutoff   for our facility and is consistent with 3rd generation assay   performance.          UROBILINOGEN UA   Negative           WBC, UA   1           WBC 13.52       12.83               Significant Imaging:  Abdominal Film: I have reviewed all results within the past 24 hours and my personal findings are:  negative.     Assessment/Plan:     Psychiatric  ETOH abuse  MELD 3.0: 11 at 9/24/2023  8:16 AM  MELD-Na: 11 at 9/24/2023  8:16 AM  Calculated from:  Serum Creatinine: 1.5 mg/dL at 9/24/2023  8:16 AM  Serum Sodium: 139 mmol/L (Using max of 137 mmol/L) at 9/24/2023  8:16 AM  Total Bilirubin: 0.8 mg/dL (Using min of 1 mg/dL) at 9/24/2023  8:16 AM  Serum Albumin: 3.7 g/dL (Using max of 3.5 g/dL) at 9/24/2023  8:16 AM  INR(ratio): 1.1 at 9/24/2023  3:57 AM  Age at listing (hypothetical): 53 years  Sex: Male at 9/24/2023  8:16 AM    Patient does not have stigmata of chronic liver disease. His platelets are normal. MELD is slightly elevated due to CKD.     Lab Results   Component Value Date    ALT 17 09/24/2023    AST 14 09/24/2023    ALKPHOS 90 09/24/2023    BILITOT 0.8 09/24/2023     Liver enzymes are normal.     GI  * Gastrointestinal hemorrhage  Will proceed with an EGD today. Risks, benefits and alternative options were discussed with the patient. Risks including but not limited to infection, bleeding, heart or respiratory problems and perforation that may require surgery were all explained to the patient. The patient had a chance for questions if there were doubts or concerns about the test. The referring provider will be notified that our consultation is complete and available through the patient's records.    Thanks for letting us participate in the care of this nice patient,      Parker Dubose          Thank you for your consult.     Parker Dubose MD  Gastroenterology  [facility])

## 2023-09-24 NOTE — ANESTHESIA POSTPROCEDURE EVALUATION
Anesthesia Post Evaluation    Patient: Grant Rodriguez    Procedure(s) Performed: Procedure(s) (LRB):  EGD (ESOPHAGOGASTRODUODENOSCOPY) (N/A)    Final Anesthesia Type: MAC      Patient location during evaluation: GI PACU  Patient participation: Yes- Able to Participate  Level of consciousness: awake and alert  Post-procedure vital signs: reviewed and stable  Pain management: adequate  Airway patency: patent    PONV status at discharge: No PONV  Anesthetic complications: no      Cardiovascular status: blood pressure returned to baseline  Respiratory status: unassisted  Hydration status: euvolemic  Follow-up not needed.          Vitals Value Taken Time   /58 09/24/23 0940   Temp 37.3 °C (99.1 °F) 09/24/23 0825   Pulse 83 09/24/23 0940   Resp 18 09/24/23 0940   SpO2 98 % 09/24/23 0940         No case tracking events are documented in the log.      Pain/Jessica Score: Jessica Score: 8 (9/24/2023  9:40 AM)

## 2023-09-24 NOTE — BRIEF OP NOTE
[facility] - Telemetry ([facility])  Brief Operative Note    SUMMARY     Surgery Date: 9/24/2023     Surgeon(s) and Role:     * Chad Thomson MD - Primary    Assisting Surgeon: None    Pre-op Diagnosis:  GI bleed [K92.2]    Post-op Diagnosis:  Post-Op Diagnosis Codes:     * Hematemesis with nausea [K92.0]     * Gastric ulcer with hemorrhage [K25.4]    Procedure(s) (LRB):  EGD (ESOPHAGOGASTRODUODENOSCOPY) (N/A)    Anesthesia: MAC    Operative Findings:   Prepyloric Ulcers  Hiatal hernia    Estimated Blood Loss: NoneEstimated Blood Loss has been documented.         Specimens:   Specimen (24h ago, onward)       Start     Ordered    09/24/23 0934  Specimen to Pathology, Surgery Gastrointestinal tract  Once        Comments: Pre-op Diagnosis: GI bleed [K92.2]Procedure(s):EGD (ESOPHAGOGASTRODUODENOSCOPY) 1. Antrum ulcer BX     References:    Click here for ordering Quick Tip   Question Answer Comment   Procedure Type: Gastrointestinal tract    Which provider would you like to cc? CHAD THOMSON    Release to patient Immediate        09/24/23 0922                  Plans:  Advanced Diet  Acid Suppression  Misoprostol  Repeat EGD in 8 weeks  Possible discharge in 24 hours or less if no recurring bleeding.   The ulcer does not have high risk stigmata.    [id number]

## 2023-09-24 NOTE — PLAN OF CARE
Dr. Dubose at bedside to speak with pt about the results of the procedure. All questions answered with no further questions at this time,

## 2023-09-24 NOTE — ASSESSMENT & PLAN NOTE
EGD completed this AM, gastric ulcers seen per GI report    GI recs as below:   1. Advanced Diet  2. Acid Suppression  3. Misoprostol  4. Repeat EGD in 8 weeks  5. Possible discharge in 24 hours or less if no recurring bleeding.   6. The ulcer does not have high risk stigmata

## 2023-09-24 NOTE — ANESTHESIA PREPROCEDURE EVALUATION
09/24/2023  Grant Rodriguez is a 53 y.o., male.    GI Bleed  Pre-op Assessment    I have reviewed the Patient Summary Reports.     I have reviewed the Nursing Notes.    I have reviewed the Medications.     Review of Systems  Anesthesia Hx:  No previous Anesthesia    Social:  Smoker Marijuana    Hematology/Oncology:  Hematology Normal   Oncology Normal     EENT/Dental:EENT/Dental Normal   Cardiovascular:   Exercise tolerance: good Hypertension NYHA Classification I    Pulmonary:   Sleep Apnea    Renal/:   Chronic Renal Disease, CKD    Hepatic/GI:  Hepatic/GI Normal    Musculoskeletal:  Musculoskeletal Normal    Neurological:  Neurology Normal    Endocrine:  Endocrine Normal  Obesity / BMI > 30  Dermatological:  Skin Normal    Psych:   anxiety          Physical Exam  General: Well nourished and Cooperative    Airway:  Mallampati: II   Mouth Opening: Normal  Tongue: Normal  Neck ROM: Normal ROM    Dental:  Intact    Chest/Lungs:  Clear to auscultation, Normal Respiratory Rate    Heart:  Rate: Normal  Rhythm: Regular Rhythm        Anesthesia Plan  Type of Anesthesia, risks & benefits discussed:    Anesthesia Type: MAC  Intra-op Monitoring Plan: Standard ASA Monitors  Post Op Pain Control Plan: multimodal analgesia  Informed Consent: Informed consent signed with the Patient and all parties understand the risks and agree with anesthesia plan.  All questions answered. Patient consented to blood products? Yes  ASA Score: 4  Day of Surgery Review of History & Physical: I have interviewed and examined the patient. I have reviewed the patient's H&P dated: There are no significant changes.     Ready For Surgery From Anesthesia Perspective.     .

## 2023-09-24 NOTE — H&P
[facility]  History & Physical    Patient Name: Grant Rodriguez  MRN: [id number]  Patient Class: IP- Inpatient  Admission Date: 9/24/2023  Attending Physician: Katerine Braden MD   Primary Care Provider: Ed Banks MD         Patient information was obtained from patient, past medical records and ER records.     Subjective:     Principal Problem:Multiple gastric ulcers    Chief Complaint:   Chief Complaint   Patient presents with    Vomiting     Pt c/o multiple vomiting episodes onset of yesterday morning. Pt states that after vomiting multiple times he then started vomiting coffee ground emesis, now pt says vomit is more blood tinged along with bright red blood in stool. Denies pain at this time         HPI: 53 y.o. male patient with a PMHx of HTN, hypogonadism, IGT, obesity, and prolactinoma who presents to the Emergency Department for evaluation of hematemesis which onset yesterday morning. Pt says that he started having coffee ground emesis which turned blood-tinged PTA. He threw up blood-tinged emesis in triage. Symptoms are constant and moderate in severity. No mitigating or exacerbating factors reported. Associated sxs include hematochezia and nausea. Patient denies any pleurisy, abd pain, CP, myalgias, and all other sxs at this time. No prior Tx. Pt says that he has no hx of liver or kidney disease. He smokes cigarettes and marijuana and drinks EtOH regularly, reportedly drinking 1 pint 2 days PTA. Pt had a colonoscopy 2 years ago with some polyps noted. He has a maternal hx of ulcers. He denies using any NSAIDs recently. In the ED, Vitals: 160/88, 103, 99.3, 98% RA. Labs: WBC: 12.83, Cr: 1.5, Occult Stool: neg. GI consulted. Treated with IV fluids, pantoprazole, Octreotide. Patient is a full code. Admitted to inpatient under the care of Hospital Medicine for management of GI Bleed.          Past Medical History:   Diagnosis Date    Hypertensive urgency  12/30/2019    Hypogonadism, male     IGT (impaired glucose tolerance)     Obesity     Prolactinoma        Past Surgical History:   Procedure Laterality Date    COLONOSCOPY N/A 12/10/2021    Procedure: COLONOSCOPY;  Surgeon: Nicole Ferraro MD;  Location: [facility];  Service: Endoscopy;  Laterality: N/A;       Review of patient's allergies indicates:  No Known Allergies    Current Facility-Administered Medications on File Prior to Encounter   Medication    lactated ringers infusion     Current Outpatient Medications on File Prior to Encounter   Medication Sig    amLODIPine (NORVASC) 10 MG tablet Take 1 tablet (10 mg total) by mouth once daily.    cabergoline (DOSTINEX) 0.5 mg tablet TAKE 2 TABLETS BY MOUTH ON MONDAY AND FRIDAY, AND TAKE 1 TABLET ON WEDNESDAY    hydrALAZINE (APRESOLINE) 25 MG tablet TAKE 1 TABLET BY MOUTH THREE TIMES DAILY    hydroCHLOROthiazide (HYDRODIURIL) 25 MG tablet Take 1 tablet by mouth once daily    potassium chloride SA (K-DUR,KLOR-CON M) 10 MEQ tablet Take 2 tablets (20 mEq total) by mouth once daily.    sildenafil (REVATIO) 20 mg Tab Take 1-5 tablets as needed on an empty stomach with no alcohol an hour before desiring an erection.    testosterone cypionate (DEPOTESTOTERONE CYPIONATE) 200 mg/mL injection Inject 1 mL (200 mg total) into the muscle every 21 days. INJECT 1ML BY INTRAMUSCULAR ROUTE EVERY 3 WEEKS Strength: 200 mg/mL     Family History       Problem Relation (Age of Onset)    Diabetes Father    Hypertension Father          Tobacco Use    Smoking status: Every Day     Current packs/day: 1.00     Average packs/day: 1 pack/day for 20.0 years (20.0 ttl pk-yrs)     Types: Cigarettes    Smokeless tobacco: Never   Substance and Sexual Activity    Alcohol use: Yes     Comment: 3-5 days vodka    Drug use: Yes     Frequency: 3.0 times per week     Types: Marijuana    Sexual activity: Yes     Partners: Female     Review of Systems   Gastrointestinal:  Positive for  abdominal pain and vomiting.   All other systems reviewed and are negative.    Objective:     Vital Signs (Most Recent):  Temp: 99.1 °F (37.3 °C) (09/24/23 0825)  Pulse: 60 (09/24/23 0958)  Resp: 18 (09/24/23 0958)  BP: (!) 136/59 (09/24/23 0958)  SpO2: 96 % (09/24/23 0958) Vital Signs (24h Range):  Temp:  [98.2 °F (36.8 °C)-99.3 °F (37.4 °C)] 99.1 °F (37.3 °C)  Pulse:  [] 60  Resp:  [11-21] 18  SpO2:  [95 %-100 %] 96 %  BP: (107-183)/() 136/59     Weight: 118.8 kg (261 lb 14.5 oz)  Body mass index is 38.68 kg/m².     Physical Exam  Vitals and nursing note reviewed.   Constitutional:       General: He is not in acute distress.     Appearance: He is well-developed. He is not diaphoretic.   HENT:      Head: Normocephalic and atraumatic.      Right Ear: Hearing and external ear normal.      Left Ear: Hearing and external ear normal.      Nose: Nose normal. No mucosal edema or rhinorrhea.      Mouth/Throat:      Pharynx: Uvula midline.   Eyes:      General:         Right eye: No discharge.         Left eye: No discharge.      Conjunctiva/sclera: Conjunctivae normal.      Right eye: No chemosis.     Left eye: No chemosis.     Pupils: Pupils are equal, round, and reactive to light.   Neck:      Thyroid: No thyroid mass or thyromegaly.      Trachea: Trachea normal.   Cardiovascular:      Rate and Rhythm: Normal rate and regular rhythm.      Pulses:           Dorsalis pedis pulses are 2+ on the right side and 2+ on the left side.      Heart sounds: Normal heart sounds. No murmur heard.  Pulmonary:      Effort: Pulmonary effort is normal. No respiratory distress.      Breath sounds: Normal breath sounds. No decreased breath sounds or wheezing.   Abdominal:      General: Bowel sounds are normal. There is no distension.      Palpations: Abdomen is soft.      Tenderness: There is no abdominal tenderness.   Musculoskeletal:         General: Normal range of motion.      Cervical back: Normal range of motion and neck  supple.   Lymphadenopathy:      Cervical: No cervical adenopathy.      Upper Body:      Right upper body: No supraclavicular adenopathy.      Left upper body: No supraclavicular adenopathy.   Skin:     General: Skin is warm and dry.      Capillary Refill: Capillary refill takes less than 2 seconds.      Findings: No rash.   Neurological:      Mental Status: He is alert and oriented to person, place, and time.   Psychiatric:         Mood and Affect: Mood is not anxious.         Speech: Speech normal.         Behavior: Behavior normal.         Thought Content: Thought content normal.         Judgment: Judgment normal.              CRANIAL NERVES     CN III, IV, VI   Pupils are equal, round, and reactive to light.       Significant Labs: All pertinent labs within the past 24 hours have been reviewed.  CBC:   Recent Labs   Lab 09/24/23  0357 09/24/23  0816   WBC 12.83* 13.52*   HGB 17.5 16.6   HCT 49.6 48.6    273     CMP:   Recent Labs   Lab 09/24/23 0357 09/24/23  0816    139   K 3.5 4.2   CL 98 98   CO2 25 26   * 170*   BUN 15 14   CREATININE 1.5* 1.5*   CALCIUM 10.1 9.6   PROT 8.6* 8.1   ALBUMIN 3.9 3.7   BILITOT 0.7 0.8   ALKPHOS 97 90   AST 13 14   ALT 19 17   ANIONGAP 14 15       Significant Imaging: I have reviewed all pertinent imaging results/findings within the past 24 hours.    Assessment/Plan:     * Multiple gastric ulcers  EGD completed this AM, gastric ulcers seen per GI report    GI recs as below:   1. Advanced Diet  2. Acid Suppression  3. Misoprostol  4. Repeat EGD in 8 weeks  5. Possible discharge in 24 hours or less if no recurring bleeding.   6. The ulcer does not have high risk stigmata      ETOH abuse  Reported significant ETOH use    --Banana Bag  --CIWA Q4H  --Ativan PRN for tremors, CIWA >8  --ETOH level: pending      Gastrointestinal hemorrhage  Coffee ground emesis reported PTA, blood tinged emesis witnessed in ED. Labs stable, vitals stable on arrival. Hgb: 17.5 on  arrival.     --Repeat CBC  --GI consulted  --NPO until EGD, pre GI recs initiate Clear Liquid diet, advance as tolerated  --EGD per GI- gastric ulcers noted on scope  --Antiemetics PRN per MAR  --Octreotide per GI recs      Stage 3 chronic kidney disease  Chronic, baseline Cr: 1.5, at baseline on admission    --avoid nephrotoxic medications  --Repeat CMP      Hypertensive urgency  Patient has a current diagnosis of hypertensive urgency (without evidence of end organ damage) which is uncontrolled.  Latest blood pressure and vitals reviewed-   Temp:  [99.3 °F (37.4 °C)]   Pulse:  []   Resp:  [11-21]   BP: (160-178)/()   SpO2:  [95 %-100 %] .   Patient currently on IV antihypertensives.   Home meds for hypertension were reviewed and noted below.   Hypertension Medications             amLODIPine (NORVASC) 10 MG tablet Take 1 tablet (10 mg total) by mouth once daily.    hydrALAZINE (APRESOLINE) 25 MG tablet TAKE 1 TABLET BY MOUTH THREE TIMES DAILY    hydroCHLOROthiazide (HYDRODIURIL) 25 MG tablet Take 1 tablet by mouth once daily          Medication adjustment for hospital antihypertensives is as follows- Hydralazine IV PRN, Labetolol IV PRN, hold PO meds until cleared per GI    Will aim for controlled BP reduction by medications noted above. Monitor and mitigate end organ damage as indicated.      VTE Risk Mitigation (From admission, onward)         Ordered     Reason for No Pharmacological VTE Prophylaxis  Once        Question:  Reasons:  Answer:  Active Bleeding    09/24/23 0723     IP VTE HIGH RISK PATIENT  Once         09/24/23 0723     Place sequential compression device  Until discontinued         09/24/23 0723 April JENIFER Rice NP  Department of Hospital Medicine  '[place] - Telemetry ([facility])

## 2023-09-24 NOTE — HPI
GI Bleeding: Patient complains of hematemesis. Symptoms have been present for approximately 1 day. The symptoms are stable. This has been associated with abdominal bloating, belching and eructation, heartburn, and nausea.  He denies melena and unexpected weight loss.  He does not have used nonsteroidal anti-inflammatory drugs on a regular bases; he is not anticoagulated.  The patient currently denies significant abdominal pain or discomfort.  There is not a past history of gastrointestinal bleeding.

## 2023-09-24 NOTE — ASSESSMENT & PLAN NOTE
MELD 3.0: 11 at 9/24/2023  8:16 AM  MELD-Na: 11 at 9/24/2023  8:16 AM  Calculated from:  Serum Creatinine: 1.5 mg/dL at 9/24/2023  8:16 AM  Serum Sodium: 139 mmol/L (Using max of 137 mmol/L) at 9/24/2023  8:16 AM  Total Bilirubin: 0.8 mg/dL (Using min of 1 mg/dL) at 9/24/2023  8:16 AM  Serum Albumin: 3.7 g/dL (Using max of 3.5 g/dL) at 9/24/2023  8:16 AM  INR(ratio): 1.1 at 9/24/2023  3:57 AM  Age at listing (hypothetical): 53 years  Sex: Male at 9/24/2023  8:16 AM    Patient does not have stigmata of chronic liver disease. His platelets are normal. MELD is slightly elevated due to CKD.     Lab Results   Component Value Date    ALT 17 09/24/2023    AST 14 09/24/2023    ALKPHOS 90 09/24/2023    BILITOT 0.8 09/24/2023     Liver enzymes are normal.

## 2023-09-24 NOTE — ASSESSMENT & PLAN NOTE
Chronic, baseline Cr: 1.5, at baseline on admission    --avoid nephrotoxic medications  --Repeat CMP

## 2023-09-24 NOTE — TELEPHONE ENCOUNTER
"Luis Grant Oshea Jennifer,    Thanks for trusting [facility] for your immediate/urgent health needs. The upper endoscopy performed today revealed that you have multiple superficial ulcers in the stomach, most likely from chronic alcohol use. We took biopsies of the ulcers (around them) and we will let you know the results as soon as we review them. An incidental finding is a hiatal hernia, a common condition that can aggravate acid reflux.     We recommend that you take the medication "misoprostol" for 14 days, 200 mcg twice a day, and also take Protonix (Pantoprazole) 40 mg twice a day for at least 8 weeks. Our team will contact you to schedule a follow up procedure to document healing of the ulcers we saw.     Your health and satisfaction are our top priorities, and we are truly honored to have the opportunity to serve you. At [facility], we strive to provide the best possible care and support for our patients. My assessment and recommendations are as follows:    We genuinely value your feedback and believe that it plays a crucial role in our pursuit of excellence. We kindly request you to take a few minutes of your time to share your experience with us by posting a [employer] review. Your honest thoughts and opinions can make a significant difference for others seeking healthcare services and will help us better understand how we can further enhance our patient care.    Your kind words and positive reviews will not only motivate our dedicated team but will also inspire confidence in potential patients who are looking for exceptional healthcare services.    Once again, we extend our sincere appreciation for giving us the opportunity to serve you. If there is anything else we can do to improve your experience or assist you further, please do not hesitate to reach out to us.    Thank you for being part of our [facility] family, and we look forward to continuing to provide you with the best care possible.    Thanks " "for trusting us with your healthcare needs and using [facility]. If you want to ask us a question, you can do so by replying to this message or by calling [phone].    Sincerely,    Parker Dubose M.D.    PS: At [facility] we offer virtual visits. Please use your [facility] sanju to schedule a virtual visit.     To rate your experience with Dr. Dubose please click on the link below:    [email or web address]    To post a review, simply follow these quick steps:  1. Visit [employer] or search for my name on Google.  2. Click on the "Write a review" button on the left-hand side of the page.  3. Rate your experience by choosing the number of stars that reflect your satisfaction.  4. Share your thoughts and insights about your visit - we'd love to hear your feedback!    "

## 2023-09-24 NOTE — TRANSFER OF CARE
"Anesthesia Transfer of Care Note    Patient: Grant Rodriguez    Procedure(s) Performed: Procedure(s) (LRB):  EGD (ESOPHAGOGASTRODUODENOSCOPY) (N/A)    Patient location: GI    Anesthesia Type: MAC    Transport from OR: Transported from OR on room air with adequate spontaneous ventilation    Post pain: adequate analgesia    Post assessment: no apparent anesthetic complications    Post vital signs: stable    Level of consciousness: lethargic    Nausea/Vomiting: no nausea/vomiting    Complications: none    Transfer of care protocol was followed      Last vitals:   Visit Vitals  BP (!) 107/58 (BP Location: Left arm, Patient Position: Lying)   Pulse 83   Temp 37.3 °C (99.1 °F)   Resp 18   Ht 5' 9" (1.753 m)   Wt 116.6 kg (256 lb 15.1 oz)   SpO2 98%   BMI 37.94 kg/m²     "

## 2023-09-24 NOTE — PLAN OF CARE
Pt admitted to floor from ED. Updated patient on plan of care. Instructed patient to use call light for assistance, call light in reach. PRN IV BP medications administered. Hourly rounding performed. Vitals q4 hours. Education provided, questions answered/encouraged. Chart check complete. Sinus madison to sinus rhythm on tele box #[id number]    Problem: Adult Inpatient Plan of Care  Goal: Plan of Care Review  Outcome: Ongoing, Progressing

## 2023-09-24 NOTE — HPI
53 y.o. male patient with a PMHx of HTN, hypogonadism, IGT, obesity, and prolactinoma who presents to the Emergency Department for evaluation of hematemesis which onset yesterday morning. Pt says that he started having coffee ground emesis which turned blood-tinged PTA. He threw up blood-tinged emesis in triage. Symptoms are constant and moderate in severity. No mitigating or exacerbating factors reported. Associated sxs include hematochezia and nausea. Patient denies any pleurisy, abd pain, CP, myalgias, and all other sxs at this time. No prior Tx. Pt says that he has no hx of liver or kidney disease. He smokes cigarettes and marijuana and drinks EtOH regularly, reportedly drinking 1 pint 2 days PTA. Pt had a colonoscopy 2 years ago with some polyps noted. He has a maternal hx of ulcers. He denies using any NSAIDs recently. In the ED, Vitals: 160/88, 103, 99.3, 98% RA. Labs: WBC: 12.83, Cr: 1.5, Occult Stool: neg. GI consulted. Treated with IV fluids, pantoprazole, Octreotide. Patient is a full code. Admitted to inpatient under the care of Hospital Medicine for management of GI Bleed.

## 2023-09-24 NOTE — [employer]ATION PATIENT INSTRUCTIONS
Discharge Summary/Instructions after an Endoscopic Procedure  Patient Name: Grant Rodriguez  Patient MRN: [id number]  Patient YOB: 1970 Sunday, September 24, 2023 Parker Dubose MD  Dear patient,  As a result of recent federal legislation (The Federal Cures Act), you may   receive lab or pathology results from your procedure in your [facility]   account before your physician is able to contact you. Your physician or   their representative will relay the results to you with their   recommendations at their soonest availability.  Thank you,  RESTRICTIONS:  During your procedure today, you received medications for sedation.  These   medications may affect your judgment, balance and coordination.  Therefore,   for 24 hours, you have the following restrictions:   - DO NOT drive a car, operate machinery, make legal/financial decisions,   sign important papers or drink alcohol.    ACTIVITY:  Today: no heavy lifting, straining or running due to procedural   sedation/anesthesia.  The following day: return to full activity including work.  DIET:  Eat and drink normally unless instructed otherwise.     TREATMENT FOR COMMON SIDE EFFECTS:  - Mild abdominal pain, nausea, belching, bloating or excessive gas:  rest,   eat lightly and use a heating pad.  - Sore Throat: treat with throat lozenges and/or gargle with warm salt   water.  - Because air was used during the procedure, expelling large amounts of air   from your rectum or belching is normal.  - If a bowel prep was taken, you may not have a bowel movement for 1-3 days.    This is normal.  SYMPTOMS TO WATCH FOR AND REPORT TO YOUR PHYSICIAN:  1. Abdominal pain or bloating, other than gas cramps.  2. Chest pain.  3. Back pain.  4. Signs of infection such as: chills or fever occurring within 24 hours   after the procedure.  5. Rectal bleeding, which would show as bright red, maroon, or black stools.   (A tablespoon of blood from the rectum is not serious, especially if    hemorrhoids are present.)  6. Vomiting.  7. Weakness or dizziness.  GO DIRECTLY TO THE NEAREST EMERGENCY ROOM IF YOU HAVE ANY OF THE FOLLOWING:      Difficulty breathing              Chills and/or fever over 101 F   Persistent vomiting and/or vomiting blood   Severe abdominal pain   Severe chest pain   Black, tarry stools   Bleeding- more than one tablespoon   Any other symptom or condition that you feel may need urgent attention  Your doctor recommends these additional instructions:  If any biopsies were taken, your doctors clinic will contact you in 1 to 2   weeks with any results.  - [facility] the [facility].   - Patient has a contact number available for emergencies.  The signs and   symptoms of potential delayed complications were discussed with the   patient.  Return to normal activities tomorrow.  Written discharge   instructions were provided to the patient.   - Advance diet as tolerated today.   - Continue present medications.   - The patient is not currently taking anticoagulant or antiplatelet agents.     - Await pathology results.   - Repeat upper endoscopy in 8 weeks to check healing.   - Return to referring physician as previously scheduled.   - Discontinue alcohol consumption.   - The findings and recommendations were discussed with the referring   physician.  For questions, problems or results please call your physician Parker Dubose MD at Work:  [phone]  If you have any questions about the above instructions, call the GI   department at [phone] or call the endoscopy unit at [phone]   from 7am until 3 pm.  [facility], EMERGENCY ROOM PHONE NUMBER:   [phone]  IF A COMPLICATION OR EMERGENCY SITUATION ARISES AND YOU ARE UNABLE TO REACH   YOUR PHYSICIAN - GO DIRECTLY TO THE EMERGENCY ROOM.  I have read or have had read to me these discharge instructions for my   procedure and have received a written copy.  I understand  these   instructions and will follow-up with my physician if I have any questions.     __________________________________       _____________________________________  Nurse Signature                                          Patient/Designated   Responsible Party Signature  Parker Dubose MD  9/24/2023 9:45:55 AM  This report has been verified and signed electronically.  Dear patient,  As a result of recent federal legislation (The Federal Cures Act), you may   receive lab or pathology results from your procedure in your [facility]   account before your physician is able to contact you. Your physician or   their representative will relay the results to you with their   recommendations at their soonest availability.  Thank you,  [employer]ATION

## 2023-09-25 ENCOUNTER — CLINICAL SUPPORT (OUTPATIENT)
Dept: SMOKING CESSATION | Facility: CLINIC | Age: 53
End: 2023-09-25
Payer: COMMERCIAL

## 2023-09-25 VITALS
WEIGHT: 261.94 LBS | RESPIRATION RATE: 18 BRPM | OXYGEN SATURATION: 97 % | TEMPERATURE: 98 F | DIASTOLIC BLOOD PRESSURE: 72 MMHG | HEIGHT: 69 IN | HEART RATE: 61 BPM | BODY MASS INDEX: 38.8 KG/M2 | SYSTOLIC BLOOD PRESSURE: 159 MMHG

## 2023-09-25 DIAGNOSIS — F17.200 NICOTINE DEPENDENCE: Primary | ICD-10-CM

## 2023-09-25 LAB
ALBUMIN SERPL BCP-MCNC: 3.9 G/DL (ref 3.5–5.2)
ALP SERPL-CCNC: 99 U/L (ref 55–135)
ALT SERPL W/O P-5'-P-CCNC: 18 U/L (ref 10–44)
ANION GAP SERPL CALC-SCNC: 12 MMOL/L (ref 8–16)
AST SERPL-CCNC: 22 U/L (ref 10–40)
BASOPHILS # BLD AUTO: 0.03 K/UL (ref 0–0.2)
BASOPHILS NFR BLD: 0.2 % (ref 0–1.9)
BILIRUB SERPL-MCNC: 1 MG/DL (ref 0.1–1)
BUN SERPL-MCNC: 13 MG/DL (ref 6–20)
CALCIUM SERPL-MCNC: 9.9 MG/DL (ref 8.7–10.5)
CHLORIDE SERPL-SCNC: 96 MMOL/L (ref 95–110)
CO2 SERPL-SCNC: 27 MMOL/L (ref 23–29)
CREAT SERPL-MCNC: 1.3 MG/DL (ref 0.5–1.4)
DIFFERENTIAL METHOD: ABNORMAL
EOSINOPHIL # BLD AUTO: 0 K/UL (ref 0–0.5)
EOSINOPHIL NFR BLD: 0.1 % (ref 0–8)
ERYTHROCYTE [DISTWIDTH] IN BLOOD BY AUTOMATED COUNT: 12.7 % (ref 11.5–14.5)
EST. GFR  (NO RACE VARIABLE): >60 ML/MIN/1.73 M^2
GLUCOSE SERPL-MCNC: 131 MG/DL (ref 70–110)
HCT VFR BLD AUTO: 51.1 % (ref 40–54)
HGB BLD-MCNC: 17.2 G/DL (ref 14–18)
IMM GRANULOCYTES # BLD AUTO: 0.05 K/UL (ref 0–0.04)
IMM GRANULOCYTES NFR BLD AUTO: 0.4 % (ref 0–0.5)
LYMPHOCYTES # BLD AUTO: 2.3 K/UL (ref 1–4.8)
LYMPHOCYTES NFR BLD: 17.6 % (ref 18–48)
MAGNESIUM SERPL-MCNC: 1.9 MG/DL (ref 1.6–2.6)
MCH RBC QN AUTO: 29 PG (ref 27–31)
MCHC RBC AUTO-ENTMCNC: 33.7 G/DL (ref 32–36)
MCV RBC AUTO: 86 FL (ref 82–98)
MONOCYTES # BLD AUTO: 0.8 K/UL (ref 0.3–1)
MONOCYTES NFR BLD: 6.1 % (ref 4–15)
NEUTROPHILS # BLD AUTO: 9.8 K/UL (ref 1.8–7.7)
NEUTROPHILS NFR BLD: 75.6 % (ref 38–73)
NRBC BLD-RTO: 0 /100 WBC
PHOSPHATE SERPL-MCNC: 3.1 MG/DL (ref 2.7–4.5)
PLATELET # BLD AUTO: 279 K/UL (ref 150–450)
PMV BLD AUTO: 9.6 FL (ref 9.2–12.9)
POTASSIUM SERPL-SCNC: 3.4 MMOL/L (ref 3.5–5.1)
PROT SERPL-MCNC: 8.3 G/DL (ref 6–8.4)
RBC # BLD AUTO: 5.94 M/UL (ref 4.6–6.2)
SODIUM SERPL-SCNC: 135 MMOL/L (ref 136–145)
WBC # BLD AUTO: 12.98 K/UL (ref 3.9–12.7)

## 2023-09-25 PROCEDURE — 36415 COLL VENOUS BLD VENIPUNCTURE: CPT | Performed by: INTERNAL MEDICINE

## 2023-09-25 PROCEDURE — 99406 PT REFUSED TOBACCO CESSATION: ICD-10-PCS | Mod: S$GLB,,,

## 2023-09-25 PROCEDURE — 99406 BEHAV CHNG SMOKING 3-10 MIN: CPT | Mod: S$GLB,,,

## 2023-09-25 PROCEDURE — 80053 COMPREHEN METABOLIC PANEL: CPT | Performed by: INTERNAL MEDICINE

## 2023-09-25 PROCEDURE — 83735 ASSAY OF MAGNESIUM: CPT | Performed by: INTERNAL MEDICINE

## 2023-09-25 PROCEDURE — 85025 COMPLETE CBC W/AUTO DIFF WBC: CPT | Performed by: INTERNAL MEDICINE

## 2023-09-25 PROCEDURE — 84100 ASSAY OF PHOSPHORUS: CPT | Performed by: INTERNAL MEDICINE

## 2023-09-25 PROCEDURE — 25000003 PHARM REV CODE 250: Performed by: NURSE PRACTITIONER

## 2023-09-25 PROCEDURE — G0378 HOSPITAL OBSERVATION PER HR: HCPCS

## 2023-09-25 PROCEDURE — 25000003 PHARM REV CODE 250: Performed by: INTERNAL MEDICINE

## 2023-09-25 RX ORDER — PANTOPRAZOLE SODIUM 40 MG/1
40 TABLET, DELAYED RELEASE ORAL 2 TIMES DAILY
Qty: 60 TABLET | Refills: 1 | Status: SHIPPED | OUTPATIENT
Start: 2023-09-25 | End: 2023-09-28

## 2023-09-25 RX ORDER — SUCRALFATE 1 G/1
1 TABLET ORAL 4 TIMES DAILY
Qty: 56 TABLET | Refills: 0 | Status: SHIPPED | OUTPATIENT
Start: 2023-09-25 | End: 2023-10-09

## 2023-09-25 RX ADMIN — MISOPROSTOL 200 MCG: 200 TABLET ORAL at 08:09

## 2023-09-25 RX ADMIN — AMLODIPINE BESYLATE 10 MG: 10 TABLET ORAL at 08:09

## 2023-09-25 RX ADMIN — PANTOPRAZOLE SODIUM 40 MG: 40 TABLET, DELAYED RELEASE ORAL at 08:09

## 2023-09-25 RX ADMIN — HYDRALAZINE HYDROCHLORIDE 25 MG: 25 TABLET, FILM COATED ORAL at 08:09

## 2023-09-25 NOTE — PLAN OF CARE
A224/A224 LEDY Rodriguez is a 53 y.o.male admitted on 9/24/2023 for Multiple gastric ulcers   Code Status: Full Code MRN: [id number]   Review of patient's allergies indicates:  No Known Allergies  Past Medical History:   Diagnosis Date    Hypertensive urgency 12/30/2019    Hypogonadism, male     IGT (impaired glucose tolerance)     Obesity     Prolactinoma       PRN meds    acetaminophen, 650 mg, Q6H PRN  dextrose 10%, 12.5 g, PRN  dextrose 10%, 25 g, PRN  glucagon (human recombinant), 1 mg, PRN  glucose, 16 g, PRN  glucose, 24 g, PRN  hydrALAZINE, 10 mg, Q6H PRN  labetaloL, 10 mg, Q6H PRN  lorazepam, 2 mg, Q4H PRN  naloxone, 0.02 mg, PRN  ondansetron, 4 mg, Q8H PRN  sodium chloride 0.9%, 10 mL, Q12H PRN      AVS Discharge instructions received and reviewed with pt and family at bedside.  Pt voiced understanding and all questions answered to satisfaction.  Stressed importance to making and keeping all follow up appointments.  Medications at bedside and reviewed with pt.  Tele monitor removed and brought to monitor tech.  IV d/c'd with tip intact, pressure dressing applied.  Pt will call when ready to be transported to front of hospital via w/c to be discharged home.               Lead Monitored: Lead II Rhythm: sinus bradycardia    Cardiac/Telemetry Box Number: [id number]    Last Bowel Movement: 09/24/23  Diet Adult Regular (IDDSI Level 7)     Michael Score: 23  Fall Risk Score: 7  Accucheck []   Freq?      Lines/Drains/Airways       None

## 2023-09-25 NOTE — HOSPITAL COURSE
Patient was admitted for hematemesis. GI consulted and EGD performed showed gastric ulcers. He was continued on PPI bid x 2 months. Carafate qid x 2 weeks. Patient's h/h was stable. He was discharged home with outpatient f/u with GI.

## 2023-09-25 NOTE — PLAN OF CARE
[facility] - Telemetry (Hospital)  Initial Discharge Assessment       Primary Care Provider: Ed Banks MD    Admission Diagnosis: Emesis [R11.10]  Chest pain [R07.9]  Gastrointestinal hemorrhage, unspecified gastrointestinal hemorrhage type [K92.2]  Vomiting, unspecified [R11.10]    Admission Date: 9/24/2023  Expected Discharge Date: 9/25/2023    Transition of Care Barriers: None    Payor: /     Extended Emergency Contact Information  Primary Emergency Contact: johnmaria teresaj carlos  Mobile Phone: [phone]  Relation: Mother  Preferred language: English   needed? No    Discharge Plan A: Home with family         [id number] [place] - [id number]  [address] [address]  Phone: [phone] Fax: [phone]      Initial Assessment (most recent)       Adult Discharge Assessment - 09/25/23 1014          Discharge Assessment    Assessment Type Discharge Planning Assessment     Confirmed/corrected address, phone number and insurance Yes     Confirmed Demographics Correct on Facesheet     Source of Information patient     When was your last doctors appointment? 02/22/23   Ed Banks MD - Internal Medicine    Communicated CITLALI with patient/caregiver Date not available/Unable to determine     Reason For Admission Multiple gastric ulcers     People in Home alone     Facility Arrived From: home     Do you expect to return to your current living situation? Yes     Do you have help at home or someone to help you manage your care at home? Yes     Who are your caregiver(s) and their phone number(s)? Charline Rodriguez - significant other     Prior to hospitilization cognitive status: Alert/Oriented     Current cognitive status: Alert/Oriented     Walking or Climbing Stairs --   independent    Dressing/Bathing --   independent    Home Accessibility other (see comments)   [facility]    Equipment Currently Used at Home none     Readmission within 30 days? No      Patient currently being followed by outpatient case management? No     Do you currently have service(s) that help you manage your care at home? No     Do you take prescription medications? Yes     Do you have prescription coverage? Yes     Coverage Generic Commercial     Do you have any problems affording any of your prescribed medications? No     Is the patient taking medications as prescribed? yes     Who is going to help you get home at discharge? Charline Rodriguez - significant other     How do you get to doctors appointments? car, drives self     Are you on dialysis? No     Do you take coumadin? No     DME Needed Upon Discharge  none     Discharge Plan discussed with: Patient     Transition of Care Barriers None     Discharge Plan A Home with family                    Anticipated DC dispo: home with family  Prior Level of Function: independent with ADLs  People in home:  alone    Comments:  SW met with patient and significant other at bedside to introduce role and discuss discharge planning. Charline, significant other, will be help at home and can provide transport at time of discharge. Confirmed demographics, insurance ,and emergency contacts. SW updated Patient's whiteboard with contact information and anticipated DC plan. CM discharge needs depends on hospital progress. SW will continue following to assist with other needs.       [facility] - [facility] (Hospital)  Discharge Final Note    Primary Care Provider: Ed Banks MD    Expected Discharge Date: 9/25/2023    Final Discharge Note (most recent)       Final Note - 09/25/23 1022          Final Note    Assessment Type Final Discharge Note     Anticipated Discharge Disposition Home or Self Care     Hospital Resources/Appts/Education Provided Appointments scheduled and added to AVS        Post-Acute Status    Coverage Generic Commercial     Discharge Delays None known at this time                     Important Message from Medicare               DC  Disposition: home with family  Family Notified: Patient by nurse  Transportation: personal transportation    Patient had no equipment or placement needs from .     Patient has PCP hospital follow up with NARENDRA Banks MD, on 9/28/23 at 3 pm.

## 2023-09-25 NOTE — DISCHARGE SUMMARY
[facility], [place] [facility]  Discharge Summary      Patient Name: Grant Rodriguez  MRN: [id number]  [facility]: [id number]  Patient Class: IP- Inpatient  Admission Date: 9/24/2023  Hospital Length of Stay: 1 days  Discharge Date and Time:  09/25/2023 12:00 PM  Attending Physician: Cruz Ortiz MD   Discharging Provider: Cruz Ortiz MD  Primary Care Provider: Ed Banks MD    Primary Care Team: [facility] MEDICINE D    HPI:   53 y.o. male patient with a PMHx of HTN, hypogonadism, IGT, obesity, and prolactinoma who presents to the Emergency Department for evaluation of hematemesis which onset yesterday morning. Pt says that he started having coffee ground emesis which turned blood-tinged PTA. He threw up blood-tinged emesis in triage. Symptoms are constant and moderate in severity. No mitigating or exacerbating factors reported. Associated sxs include hematochezia and nausea. Patient denies any pleurisy, abd pain, CP, myalgias, and all other sxs at this time. No prior Tx. Pt says that he has no hx of liver or kidney disease. He smokes cigarettes and marijuana and drinks EtOH regularly, reportedly drinking 1 pint 2 days PTA. Pt had a colonoscopy 2 years ago with some polyps noted. He has a maternal hx of ulcers. He denies using any NSAIDs recently. In the ED, Vitals: 160/88, 103, 99.3, 98% RA. Labs: WBC: 12.83, Cr: 1.5, Occult Stool: neg. GI consulted. Treated with IV fluids, pantoprazole, Octreotide. Patient is a full code. Admitted to inpatient under the care of [facility] for management of GI Bleed.          Procedure(s) (LRB):  EGD (ESOPHAGOGASTRODUODENOSCOPY) (N/A)      Hospital Course:   Patient was admitted for hematemesis. GI consulted and EGD performed showed gastric ulcers. He was continued on PPI bid x 2 months. Carafate qid x 2 weeks. Patient's h/h was stable. He was discharged home with outpatient f/u with GI.        Goals of Care Treatment Preferences:  Code Status: Full  Code      Consults:   Consults (From admission, onward)        Status Ordering Provider     Inpatient consult to Gastroenterology  Once        Provider:  Nicole Ferraro MD    Completed ELIZAEBTH BANERJEE JR          No new Assessment & Plan notes have been filed under this hospital service since the last note was generated.  Service: Hospital Medicine    Final Active Diagnoses:    Diagnosis Date Noted POA    PRINCIPAL PROBLEM:  Multiple gastric ulcers [K25.9] 09/24/2023 Yes    Gastrointestinal hemorrhage [K92.2] 09/24/2023 Yes    ETOH abuse [F10.10] 09/24/2023 Yes    Hiatal hernia [K44.9] 09/24/2023 Yes    Stage 3 chronic kidney disease [N18.30] 01/07/2020 Yes    Hypertensive urgency [I16.0] 12/30/2019 Yes      Problems Resolved During this Admission:       Discharged Condition: good    Disposition: Home or Self Care    Follow Up:   Follow-up Information     Parker Dubose MD Follow up in 2 week(s).    Specialty: Gastroenterology  Contact information:  [address] [place] [address] [phone]                       Patient Instructions:   No discharge procedures on file.    Significant Diagnostic Studies: Labs:   BMP:   Recent Labs   Lab 09/24/23  0357 09/24/23  0816 09/25/23  0450   * 170* 131*    139 135*   K 3.5 4.2 3.4*   CL 98 98 96   CO2 25 26 27   BUN 15 14 13   CREATININE 1.5* 1.5* 1.3   CALCIUM 10.1 9.6 9.9   MG  --   --  1.9    and CBC   Recent Labs   Lab 09/24/23  0357 09/24/23  0816 09/24/23  1534 09/25/23  0451   WBC 12.83* 13.52*  --  12.98*   HGB 17.5 16.6 16.2 17.2   HCT 49.6 48.6 46.2 51.1    273  --  279       Pending Diagnostic Studies:     Procedure Component Value Units Date/Time    Specimen to Pathology, Surgery Gastrointestinal tract [[id number]] Collected: 09/24/23 0938    Order Status: Sent Lab Status: In process Updated: 09/25/23 0833    Specimen: Tissue          Medications:  Reconciled Home Medications:      Medication List      START  taking these medications    pantoprazole 40 MG tablet  Commonly known as: PROTONIX  Take 1 tablet (40 mg total) by mouth 2 (two) times daily.     sucralfate 1 gram tablet  Commonly known as: CARAFATE  Take 1 tablet (1 g total) by mouth 4 (four) times daily. for 14 days        CONTINUE taking these medications    amLODIPine 10 MG tablet  Commonly known as: NORVASC  Take 1 tablet (10 mg total) by mouth once daily.     cabergoline 0.5 mg tablet  Commonly known as: DOSTINEX  TAKE 2 TABLETS BY MOUTH ON MONDAY AND FRIDAY, AND TAKE 1 TABLET ON WEDNESDAY     hydrALAZINE 25 MG tablet  Commonly known as: APRESOLINE  TAKE 1 TABLET BY MOUTH THREE TIMES DAILY     hydroCHLOROthiazide 25 MG tablet  Commonly known as: HYDRODIURIL  Take 1 tablet by mouth once daily     potassium chloride SA 10 MEQ tablet  Commonly known as: K-DUR,KLOR-CON M  Take 2 tablets (20 mEq total) by mouth once daily.     sildenafil 20 mg Tab  Commonly known as: REVATIO  Take 1-5 tablets as needed on an empty stomach with no alcohol an hour before desiring an erection.     testosterone cypionate 200 mg/mL injection  Commonly known as: DEPOTESTOTERONE CYPIONATE  Inject 1 mL (200 mg total) into the muscle every 21 days. INJECT 1ML BY INTRAMUSCULAR ROUTE EVERY 3 WEEKS Strength: 200 mg/mL            Indwelling Lines/Drains at time of discharge:   Lines/Drains/Airways     None                 Time spent on the discharge of patient: 36 minutes         Cruz Ortiz MD  Department of Hospital Medicine  '[place] [facility] - Telemetry ([facility])

## 2023-09-26 ENCOUNTER — TELEPHONE (OUTPATIENT)
Dept: INTERNAL MEDICINE | Facility: CLINIC | Age: 53
End: 2023-09-26
Payer: COMMERCIAL

## 2023-09-26 LAB
FINAL PATHOLOGIC DIAGNOSIS: NORMAL
GROSS: NORMAL
Lab: NORMAL

## 2023-09-26 NOTE — TELEPHONE ENCOUNTER
----- Message from Briseida Ureña sent at 9/26/2023  3:34 PM CDT -----  Patient is requesting the nurse give him a call back at [phone]

## 2023-09-26 NOTE — TELEPHONE ENCOUNTER
Returned call to pt and he is requesting hosp f/u to be virtual. Appt changed to virtual instead of in person

## 2023-09-27 NOTE — PROGRESS NOTES
Mr. Grant Rodriguez,    Biopsies obtained during your procedure are essentially benign/normal. No further action is needed at this point. The reasons for the ulcers are most likely related to the ingestion of alcohol, which irritated the lining of the stomach.     Thanks for using [facility], Aldo J. Russo, M.D.    To rate your experience with Dr. Dubose please go to the link below:   [email or web address]

## 2023-09-28 ENCOUNTER — OFFICE VISIT (OUTPATIENT)
Dept: INTERNAL MEDICINE | Facility: CLINIC | Age: 53
End: 2023-09-28
Payer: COMMERCIAL

## 2023-09-28 DIAGNOSIS — K92.2 GASTROINTESTINAL HEMORRHAGE, UNSPECIFIED GASTROINTESTINAL HEMORRHAGE TYPE: Primary | ICD-10-CM

## 2023-09-28 PROCEDURE — 99214 PR OFFICE/OUTPT VISIT, EST, LEVL IV, 30-39 MIN: ICD-10-PCS | Mod: 95,,, | Performed by: PEDIATRICS

## 2023-09-28 PROCEDURE — 99214 OFFICE O/P EST MOD 30 MIN: CPT | Mod: 95,,, | Performed by: PEDIATRICS

## 2023-09-28 RX ORDER — ONDANSETRON 8 MG/1
8 TABLET, ORALLY DISINTEGRATING ORAL 3 TIMES DAILY PRN
Qty: 30 TABLET | Refills: 0 | Status: SHIPPED | OUTPATIENT
Start: 2023-09-28 | End: 2023-10-08

## 2023-09-28 NOTE — PROGRESS NOTES
TELEMEDICINE VIRTUAL VISIT    Subjective:       Patient ID: Grant Rodriguez is a 53 y.o. male.    Chief Complaint: Hospital Follow Up    Televisit with wife for hospital f/u. Hospitalized for UGI bleed, EGD shoed bleeding gastric ulcers with benign path. Pt drinks. Had now stopped. Still working on endurance, still week, having emesis with sulcrafate but try to swallow whole pill. Compliant with pantoprazole bid. Always feels hot but not true fever. No black stools.    Fever   This is a new problem. The current episode started in the past 7 days. The problem occurs constantly. The problem has been waxing and waning. His temperature was unmeasured prior to arrival. Associated symptoms include nausea, sleepiness and vomiting. Pertinent negatives include no abdominal pain, chest pain, congestion, coughing, diarrhea, ear pain, headaches, muscle aches, rash, sore throat, urinary pain or wheezing. He has tried cool baths for the symptoms. The treatment provided no relief.     Review of Systems   Constitutional:  Positive for fever.   HENT:  Negative for congestion, ear pain and sore throat.    Respiratory:  Negative for cough and wheezing.    Cardiovascular:  Negative for chest pain.   Gastrointestinal:  Positive for nausea and vomiting. Negative for abdominal pain and diarrhea.   Genitourinary:  Negative for dysuria.   Skin:  Negative for rash.   Neurological:  Negative for headaches.       Objective:      CONSTITUTIONAL: No apparent distress. Does not appear acutely ill or septic. Appears adequately hydrated.  PULM: Breathing unlabored.  PSYCHIATRIC: Alert and conversant and grossly oriented. Mood is grossly neutral. Affect appropriate. Judgment and insight grossly intact.      Assessment:       1. Gastrointestinal hemorrhage, unspecified gastrointestinal hemorrhage type        Plan:       Gastrointestinal hemorrhage, unspecified gastrointestinal hemorrhage type  -     Comprehensive Metabolic Panel; Future; Expected  "date: 09/28/2023  -     CBC Auto Differential; Future; Expected date: 09/28/2023    Other orders  -     ondansetron (ZOFRAN-ODT) 8 MG TbDL; Take 1 tablet (8 mg total) by mouth 3 (three) times daily as needed.  Dispense: 30 tablet; Refill: 0       Dissolve sulcrafate in 1 oz water and take to see if N/V resolves, may use zofran. Stay off etoh. If true fever be seen. Maintain PPI BID. F/U next week with CBC and C20. 15-20 min.  Documentation entered by me for this encounter may have been done in part using speech-recognition technology. Although I have made an effort to ensure accuracy, "sound like" errors may exist and should be interpreted in context. -NARENDRA Banks MD    Visit Details: This visit was a telemedicine virtual visit with synchronous audio and video. Grant reported that his location at the time of this visit was in the [facility]. Grant had the choice to come into office to receive these medical services. Grant chose and consented to receive these medical services by telemedicine.      Transitional Care Note    Family and/or Caretaker present at visit?  Yes.  Diagnostic tests reviewed/disposition: I have reviewed all completed as well as pending diagnostic tests at the time of discharge.  Disease/illness education: given  Home health/community services discussion/referrals: Patient does not have home health established from hospital visit.  They do not need home health.  If needed, we will set up home health for the patient.   Establishment or re-establishment of referral orders for community resources: No other necessary community resources.   Discussion with other health care providers: No discussion with other health care providers necessary.        "

## 2023-09-29 ENCOUNTER — PATIENT MESSAGE (OUTPATIENT)
Dept: GASTROENTEROLOGY | Facility: CLINIC | Age: 53
End: 2023-09-29
Payer: COMMERCIAL

## 2023-10-11 ENCOUNTER — LAB VISIT (OUTPATIENT)
Dept: LAB | Facility: HOSPITAL | Age: 53
End: 2023-10-11
Attending: PEDIATRICS
Payer: COMMERCIAL

## 2023-10-11 ENCOUNTER — OFFICE VISIT (OUTPATIENT)
Dept: INTERNAL MEDICINE | Facility: CLINIC | Age: 53
End: 2023-10-11
Payer: COMMERCIAL

## 2023-10-11 VITALS
RESPIRATION RATE: 16 BRPM | TEMPERATURE: 97 F | BODY MASS INDEX: 39.21 KG/M2 | DIASTOLIC BLOOD PRESSURE: 76 MMHG | SYSTOLIC BLOOD PRESSURE: 144 MMHG | OXYGEN SATURATION: 99 % | HEIGHT: 69 IN | WEIGHT: 264.75 LBS | HEART RATE: 88 BPM

## 2023-10-11 DIAGNOSIS — I10 PRIMARY HYPERTENSION: ICD-10-CM

## 2023-10-11 DIAGNOSIS — K92.2 GASTROINTESTINAL HEMORRHAGE, UNSPECIFIED GASTROINTESTINAL HEMORRHAGE TYPE: Primary | ICD-10-CM

## 2023-10-11 DIAGNOSIS — K25.9 MULTIPLE GASTRIC ULCERS: ICD-10-CM

## 2023-10-11 DIAGNOSIS — K92.2 GASTROINTESTINAL HEMORRHAGE, UNSPECIFIED GASTROINTESTINAL HEMORRHAGE TYPE: ICD-10-CM

## 2023-10-11 PROBLEM — F10.10 ETOH ABUSE: Status: RESOLVED | Noted: 2023-09-24 | Resolved: 2023-10-11

## 2023-10-11 LAB
ALBUMIN SERPL BCP-MCNC: 3.4 G/DL (ref 3.5–5.2)
ALP SERPL-CCNC: 70 U/L (ref 55–135)
ALT SERPL W/O P-5'-P-CCNC: 28 U/L (ref 10–44)
ANION GAP SERPL CALC-SCNC: 11 MMOL/L (ref 8–16)
AST SERPL-CCNC: 23 U/L (ref 10–40)
BASOPHILS # BLD AUTO: 0.04 K/UL (ref 0–0.2)
BASOPHILS NFR BLD: 0.4 % (ref 0–1.9)
BILIRUB SERPL-MCNC: 0.4 MG/DL (ref 0.1–1)
BUN SERPL-MCNC: 17 MG/DL (ref 6–20)
CALCIUM SERPL-MCNC: 9 MG/DL (ref 8.7–10.5)
CHLORIDE SERPL-SCNC: 104 MMOL/L (ref 95–110)
CO2 SERPL-SCNC: 25 MMOL/L (ref 23–29)
CREAT SERPL-MCNC: 1.5 MG/DL (ref 0.5–1.4)
DIFFERENTIAL METHOD: ABNORMAL
EOSINOPHIL # BLD AUTO: 0.4 K/UL (ref 0–0.5)
EOSINOPHIL NFR BLD: 3.8 % (ref 0–8)
ERYTHROCYTE [DISTWIDTH] IN BLOOD BY AUTOMATED COUNT: 13.6 % (ref 11.5–14.5)
EST. GFR  (NO RACE VARIABLE): 55 ML/MIN/1.73 M^2
GLUCOSE SERPL-MCNC: 107 MG/DL (ref 70–110)
HCT VFR BLD AUTO: 39.2 % (ref 40–54)
HGB BLD-MCNC: 13.3 G/DL (ref 14–18)
IMM GRANULOCYTES # BLD AUTO: 0.03 K/UL (ref 0–0.04)
IMM GRANULOCYTES NFR BLD AUTO: 0.3 % (ref 0–0.5)
LYMPHOCYTES # BLD AUTO: 2.2 K/UL (ref 1–4.8)
LYMPHOCYTES NFR BLD: 24 % (ref 18–48)
MCH RBC QN AUTO: 30.1 PG (ref 27–31)
MCHC RBC AUTO-ENTMCNC: 33.9 G/DL (ref 32–36)
MCV RBC AUTO: 89 FL (ref 82–98)
MONOCYTES # BLD AUTO: 0.6 K/UL (ref 0.3–1)
MONOCYTES NFR BLD: 6.5 % (ref 4–15)
NEUTROPHILS # BLD AUTO: 6 K/UL (ref 1.8–7.7)
NEUTROPHILS NFR BLD: 65 % (ref 38–73)
NRBC BLD-RTO: 0 /100 WBC
PLATELET # BLD AUTO: 294 K/UL (ref 150–450)
PMV BLD AUTO: 9.2 FL (ref 9.2–12.9)
POTASSIUM SERPL-SCNC: 4.4 MMOL/L (ref 3.5–5.1)
PROT SERPL-MCNC: 6.8 G/DL (ref 6–8.4)
RBC # BLD AUTO: 4.42 M/UL (ref 4.6–6.2)
SODIUM SERPL-SCNC: 140 MMOL/L (ref 136–145)
WBC # BLD AUTO: 9.24 K/UL (ref 3.9–12.7)

## 2023-10-11 PROCEDURE — 85025 COMPLETE CBC W/AUTO DIFF WBC: CPT | Performed by: PEDIATRICS

## 2023-10-11 PROCEDURE — 90471 FLU VACCINE (QUAD) GREATER THAN OR EQUAL TO 3YO PRESERVATIVE FREE IM: ICD-10-PCS | Mod: S$GLB,,, | Performed by: PEDIATRICS

## 2023-10-11 PROCEDURE — 90686 IIV4 VACC NO PRSV 0.5 ML IM: CPT | Mod: S$GLB,,, | Performed by: PEDIATRICS

## 2023-10-11 PROCEDURE — 99214 OFFICE O/P EST MOD 30 MIN: CPT | Mod: 25,S$GLB,, | Performed by: PEDIATRICS

## 2023-10-11 PROCEDURE — 90471 IMMUNIZATION ADMIN: CPT | Mod: S$GLB,,, | Performed by: PEDIATRICS

## 2023-10-11 PROCEDURE — 99214 PR OFFICE/OUTPT VISIT, EST, LEVL IV, 30-39 MIN: ICD-10-PCS | Mod: 25,S$GLB,, | Performed by: PEDIATRICS

## 2023-10-11 PROCEDURE — 80053 COMPREHEN METABOLIC PANEL: CPT | Performed by: PEDIATRICS

## 2023-10-11 PROCEDURE — 99999 PR PBB SHADOW E&M-EST. PATIENT-LVL IV: CPT | Mod: PBBFAC,,, | Performed by: PEDIATRICS

## 2023-10-11 PROCEDURE — 90686 FLU VACCINE (QUAD) GREATER THAN OR EQUAL TO 3YO PRESERVATIVE FREE IM: ICD-10-PCS | Mod: S$GLB,,, | Performed by: PEDIATRICS

## 2023-10-11 PROCEDURE — 36415 COLL VENOUS BLD VENIPUNCTURE: CPT | Performed by: PEDIATRICS

## 2023-10-11 PROCEDURE — 99999 PR PBB SHADOW E&M-EST. PATIENT-LVL IV: ICD-10-PCS | Mod: PBBFAC,,, | Performed by: PEDIATRICS

## 2023-10-11 RX ORDER — HYDRALAZINE HYDROCHLORIDE 50 MG/1
50 TABLET, FILM COATED ORAL 3 TIMES DAILY
Qty: 270 TABLET | Refills: 3 | Status: SHIPPED | OUTPATIENT
Start: 2023-10-11

## 2023-10-11 NOTE — PROGRESS NOTES
Subjective     Patient ID: Grant Rodriguez is a 53 y.o. male.    Chief Complaint: Follow-up    Grant Rodriguez is a 53 y.o. male who presents to the clinic for a follow up of hematemesis. Was scoped at last hospitlization and found gastric ulcers due to acute alcohol intake. Pt reports he feels better, is compliant with medications, no longer having hematemesis or blood in stool. Has not had any alcohol use. Finishing carafate and is 1/2 month treatment of BID pantoprazole.             Review of Systems   Constitutional:  Negative for chills, diaphoresis, fatigue and fever.   HENT:  Negative for sore throat and trouble swallowing.    Respiratory:  Negative for cough and shortness of breath.    Cardiovascular:  Negative for chest pain.   Gastrointestinal:  Negative for abdominal pain, anal bleeding, blood in stool, constipation, diarrhea, nausea and vomiting.   Endocrine: Negative for cold intolerance, heat intolerance, polydipsia, polyphagia and polyuria.   All other systems reviewed and are negative.         Objective     Physical Exam  Constitutional:       General: He is not in acute distress.     Appearance: Normal appearance. He is obese. He is not ill-appearing or toxic-appearing.   Cardiovascular:      Rate and Rhythm: Normal rate and regular rhythm.      Pulses: Normal pulses.      Heart sounds: Normal heart sounds. No murmur heard.     No friction rub. No gallop.   Pulmonary:      Effort: Pulmonary effort is normal.      Breath sounds: Normal breath sounds.   Abdominal:      General: There is no distension.      Palpations: There is no mass.      Tenderness: There is no abdominal tenderness. There is no guarding or rebound.      Hernia: No hernia is present.   Neurological:      Mental Status: He is alert and oriented to person, place, and time.   Psychiatric:         Mood and Affect: Mood normal.         Behavior: Behavior normal.         Thought Content: Thought content normal.         Judgment:  Judgment normal.            Assessment and Plan     1. Gastrointestinal hemorrhage, unspecified gastrointestinal hemorrhage type  Overview:  Patient presents with 2 episodes of betsy hematemesis. He is a  and drinks a pint of vodka every 4 to 5 days. His stool tested positive for occult blood. Patient presents to the ED overnight.     Vitals:    09/24/23 0732 09/24/23 0746 09/24/23 0825 09/24/23 0900   BP: (!) 165/78  (!) 183/89 (!) 176/85   Pulse: 64  60 68   Resp:  18 20 18   Temp:  98.2 °F (36.8 °C) 99.1 °F (37.3 °C)    TempSrc:  Oral     SpO2: 98%  95% 97%   Weight:       Height:         He denies melena, abdominal pain, jaundice, weight loss. He reports nausea and chronic GERD. Had a colonoscopy in 2021 that found polyps.       2. Multiple gastric ulcers    3. Primary hypertension    Other orders  -     Influenza - Quadrivalent (PF)  -     hydrALAZINE (APRESOLINE) 50 MG tablet; Take 1 tablet (50 mg total) by mouth 3 (three) times daily.  Dispense: 270 tablet; Refill: 3        Labs today. Continue PPI for 1 month more and then pt can stop completely, if needed take an OTC PPI. Alcohol abuse has been taken off of problem list. Flu today. Maintain amlodipine 10 mg and HCTZ 25 mg and increase hydralazine to 50 mg tid.. Covid vaccine this month discussed. Keep follow up as arranged.          No follow-ups on file.

## 2023-11-06 ENCOUNTER — HOSPITAL ENCOUNTER (OUTPATIENT)
Dept: PREADMISSION TESTING | Facility: HOSPITAL | Age: 53
Discharge: HOME | End: 2023-11-06
Attending: INTERNAL MEDICINE
Payer: COMMERCIAL

## 2023-11-06 DIAGNOSIS — K25.9 MULTIPLE GASTRIC ULCERS: ICD-10-CM

## 2023-11-07 ENCOUNTER — HOSPITAL ENCOUNTER (OUTPATIENT)
Dept: PREADMISSION TESTING | Facility: HOSPITAL | Age: 53
Discharge: HOME | End: 2023-11-07
Attending: COLON & RECTAL SURGERY
Payer: COMMERCIAL

## 2023-11-07 DIAGNOSIS — K25.9 MULTIPLE GASTRIC ULCERS: Primary | ICD-10-CM

## 2023-12-25 PROBLEM — K92.2 GASTROINTESTINAL HEMORRHAGE: Status: RESOLVED | Noted: 2023-09-24 | Resolved: 2023-12-25

## 2024-01-07 NOTE — TELEPHONE ENCOUNTER
No care due was identified.  Health [facility] Embedded Care Due Messages. Reference number: [id number].   1/07/2024 7:30:46 AM CST

## 2024-01-08 RX ORDER — POTASSIUM CHLORIDE 750 MG/1
20 TABLET, EXTENDED RELEASE ORAL
Qty: 180 TABLET | Refills: 3 | Status: SHIPPED | OUTPATIENT
Start: 2024-01-08

## 2024-01-08 RX ORDER — HYDROCHLOROTHIAZIDE 25 MG/1
TABLET ORAL
Qty: 90 TABLET | Refills: 0 | Status: SHIPPED | OUTPATIENT
Start: 2024-01-08

## 2024-01-08 NOTE — TELEPHONE ENCOUNTER
Refill Routing Note   Medication(s) are not appropriate for processing by [facility] for the following reason(s):        Required vitals abnormal  Required labs abnormal    [facility] action(s):  Defer               Appointments  past 12m or future 3m with PCP    Date Provider   Last Visit   10/11/2023 Ed Banks MD   Next Visit   2/22/2024 Ed Banks MD   ED visits in past 90 days: 0        Note composed:1:36 PM 01/08/2024

## 2024-02-06 ENCOUNTER — PATIENT MESSAGE (OUTPATIENT)
Dept: GASTROENTEROLOGY | Facility: CLINIC | Age: 54
End: 2024-02-06
Payer: COMMERCIAL

## 2024-02-06 PROBLEM — Z87.11 HISTORY OF GASTRIC ULCER: Status: ACTIVE | Noted: 2024-02-06

## 2024-02-20 DIAGNOSIS — D35.2 PROLACTINOMA: ICD-10-CM

## 2024-02-20 DIAGNOSIS — E29.1 HYPOGONADISM IN MALE: ICD-10-CM

## 2024-02-20 DIAGNOSIS — D35.2 BENIGN NEOPLASM OF PITUITARY GLAND AND CRANIOPHARYNGEAL DUCT (POUCH): ICD-10-CM

## 2024-02-20 DIAGNOSIS — I10 HYPERTENSION, UNSPECIFIED TYPE: ICD-10-CM

## 2024-02-20 DIAGNOSIS — D35.3 BENIGN NEOPLASM OF PITUITARY GLAND AND CRANIOPHARYNGEAL DUCT (POUCH): ICD-10-CM

## 2024-02-20 DIAGNOSIS — N52.9 ERECTILE DYSFUNCTION, UNSPECIFIED ERECTILE DYSFUNCTION TYPE: ICD-10-CM

## 2024-02-20 DIAGNOSIS — Z12.5 PROSTATE CANCER SCREENING: ICD-10-CM

## 2024-02-21 RX ORDER — SILDENAFIL CITRATE 20 MG/1
TABLET ORAL
Qty: 50 TABLET | Refills: 11 | Status: SHIPPED | OUTPATIENT
Start: 2024-02-21

## 2024-02-21 RX ORDER — AMLODIPINE BESYLATE 10 MG/1
10 TABLET ORAL DAILY
Qty: 30 TABLET | Refills: 0 | Status: SHIPPED | OUTPATIENT
Start: 2024-02-21 | End: 2024-05-01 | Stop reason: SDUPTHER

## 2024-04-29 NOTE — TELEPHONE ENCOUNTER
Please see the attached refill request. Pt is scheduled for Wednesday. Can lab orders be placed prior to visit? Pt would like to  do before visit so you can go over Wednesday.

## 2024-05-01 ENCOUNTER — OFFICE VISIT (OUTPATIENT)
Dept: INTERNAL MEDICINE | Facility: CLINIC | Age: 54
End: 2024-05-01
Payer: COMMERCIAL

## 2024-05-01 VITALS
OXYGEN SATURATION: 98 % | BODY MASS INDEX: 38.33 KG/M2 | WEIGHT: 258.81 LBS | DIASTOLIC BLOOD PRESSURE: 72 MMHG | HEART RATE: 87 BPM | TEMPERATURE: 98 F | HEIGHT: 69 IN | SYSTOLIC BLOOD PRESSURE: 118 MMHG

## 2024-05-01 DIAGNOSIS — N18.30 STAGE 3 CHRONIC KIDNEY DISEASE, UNSPECIFIED WHETHER STAGE 3A OR 3B CKD: ICD-10-CM

## 2024-05-01 DIAGNOSIS — G47.33 OSA (OBSTRUCTIVE SLEEP APNEA): ICD-10-CM

## 2024-05-01 DIAGNOSIS — N40.0 BENIGN PROSTATIC HYPERPLASIA WITHOUT LOWER URINARY TRACT SYMPTOMS: ICD-10-CM

## 2024-05-01 DIAGNOSIS — Z12.5 SCREENING FOR MALIGNANT NEOPLASM OF PROSTATE: ICD-10-CM

## 2024-05-01 DIAGNOSIS — R73.02 IGT (IMPAIRED GLUCOSE TOLERANCE): ICD-10-CM

## 2024-05-01 DIAGNOSIS — E66.9 CLASS 2 OBESITY WITH BODY MASS INDEX (BMI) OF 36.0 TO 36.9 IN ADULT, UNSPECIFIED OBESITY TYPE, UNSPECIFIED WHETHER SERIOUS COMORBIDITY PRESENT: ICD-10-CM

## 2024-05-01 DIAGNOSIS — Z87.11 HISTORY OF GASTRIC ULCER: ICD-10-CM

## 2024-05-01 DIAGNOSIS — I10 PRIMARY HYPERTENSION: Primary | ICD-10-CM

## 2024-05-01 DIAGNOSIS — F17.200 SMOKER: ICD-10-CM

## 2024-05-01 DIAGNOSIS — D35.2 PROLACTINOMA: ICD-10-CM

## 2024-05-01 PROCEDURE — 99999 PR PBB SHADOW E&M-EST. PATIENT-LVL III: CPT | Mod: PBBFAC,,, | Performed by: NURSE PRACTITIONER

## 2024-05-01 PROCEDURE — 99214 OFFICE O/P EST MOD 30 MIN: CPT | Mod: S$GLB,,, | Performed by: NURSE PRACTITIONER

## 2024-05-01 PROCEDURE — G2211 COMPLEX E/M VISIT ADD ON: HCPCS | Mod: S$GLB,,, | Performed by: NURSE PRACTITIONER

## 2024-05-01 RX ORDER — AMLODIPINE BESYLATE 10 MG/1
10 TABLET ORAL DAILY
Qty: 90 TABLET | Refills: 1 | Status: SHIPPED | OUTPATIENT
Start: 2024-05-01 | End: 2024-10-28

## 2024-05-01 RX ORDER — AMLODIPINE BESYLATE 10 MG/1
10 TABLET ORAL
Qty: 30 TABLET | Refills: 0 | OUTPATIENT
Start: 2024-05-01

## 2024-05-01 RX ORDER — HYDROCHLOROTHIAZIDE 25 MG/1
25 TABLET ORAL DAILY
Qty: 90 TABLET | Refills: 1 | Status: SHIPPED | OUTPATIENT
Start: 2024-05-01

## 2024-05-01 RX ORDER — CEFDINIR 300 MG/1
CAPSULE ORAL
COMMUNITY
Start: 2023-10-28

## 2024-05-01 NOTE — PROGRESS NOTES
Subjective:       Patient ID: Grant Rodriguez is a 54 y.o. male.    Chief Complaint: Annual Exam and Medication Refill    HPI    1) HTN: no at home BP checks. No HTNive Sx. Compliant with meds.   2) IGT: last glucose stable. asymptomatic  3) Obesity:reports exercising 3-4 x a week. Healthier diet  4) Hypogonad/prolactinoma: sees urology, stopped taking testosterone, taking Dostinex 2 Sunday 1 wed and fri. He is hesitant to taper because of previous SE when tapered greater than 3yr ago.   5) CKD: sees renal  6) JILLIAN: noncompliant with CPAP  7) renal artery stenosis: followed by nephrology in past  8) smoker- 1 ppd- no desire to quit          Review of Systems   Constitutional:  Negative for activity change, appetite change, chills, diaphoresis, fatigue, fever and unexpected weight change.   HENT:  Negative for congestion, ear pain, postnasal drip, rhinorrhea, sinus pressure, sinus pain, sneezing, sore throat, tinnitus, trouble swallowing and voice change.    Eyes:  Negative for photophobia, pain and visual disturbance.   Respiratory:  Negative for cough, chest tightness, shortness of breath and wheezing.    Cardiovascular:  Negative for chest pain, palpitations and leg swelling.   Gastrointestinal:  Negative for abdominal distention, abdominal pain, constipation, diarrhea, nausea and vomiting.   Genitourinary:  Negative for decreased urine volume, difficulty urinating, dysuria, flank pain, frequency, hematuria and urgency.   Musculoskeletal:  Negative for arthralgias, back pain, joint swelling, neck pain and neck stiffness.   Allergic/Immunologic: Negative for immunocompromised state.   Neurological:  Negative for dizziness, tremors, seizures, syncope, facial asymmetry, speech difficulty, weakness, light-headedness, numbness and headaches.   Hematological:  Negative for adenopathy. Does not bruise/bleed easily.   Psychiatric/Behavioral:  Negative for confusion and sleep disturbance.        Objective:      Physical  Exam  Constitutional:       Appearance: He is obese.   HENT:      Head: Normocephalic and atraumatic.      Right Ear: Tympanic membrane normal.      Left Ear: Tympanic membrane normal.   Eyes:      Conjunctiva/sclera: Conjunctivae normal.   Cardiovascular:      Rate and Rhythm: Normal rate and regular rhythm.      Heart sounds: Normal heart sounds.   Pulmonary:      Effort: Pulmonary effort is normal.      Breath sounds: Normal breath sounds.   Abdominal:      General: Bowel sounds are normal.      Palpations: Abdomen is soft.   Musculoskeletal:         General: Normal range of motion.      Cervical back: Normal range of motion and neck supple.   Skin:     General: Skin is warm and dry.   Neurological:      Mental Status: He is alert and oriented to person, place, and time.         Assessment:     Vitals:    05/01/24 0824   BP: 118/72   Pulse: 87   Temp: 97.8 °F (36.6 °C)         1. Primary hypertension    2. Stage 3 chronic kidney disease, unspecified whether stage 3a or 3b CKD    3. Benign prostatic hyperplasia without lower urinary tract symptoms    4. IGT (impaired glucose tolerance)    5. Class 2 obesity with body mass index (BMI) of 36.0 to 36.9 in adult, unspecified obesity type, unspecified whether serious comorbidity present    6. History of gastric ulcer    7. JILLIAN (obstructive sleep apnea)    8. Screening for malignant neoplasm of prostate    9. Prolactinoma    10. Smoker        Plan:   Primary hypertension  -     amLODIPine (NORVASC) 10 MG tablet; Take 1 tablet (10 mg total) by mouth once daily.  Dispense: 90 tablet; Refill: 1  -     hydroCHLOROthiazide (HYDRODIURIL) 25 MG tablet; Take 1 tablet (25 mg total) by mouth once daily.  Dispense: 90 tablet; Refill: 1  -     Comprehensive Metabolic Panel; Future; Expected date: 05/01/2024  -     CBC Auto Differential; Future; Expected date: 05/01/2024  -     Lipid Panel; Future; Expected date: 05/01/2024  -     Lipid Panel; Future; Expected date: 10/28/2024  -      Comprehensive Metabolic Panel; Future; Expected date: 10/28/2024    Stage 3 chronic kidney disease, unspecified whether stage 3a or 3b CKD    Benign prostatic hyperplasia without lower urinary tract symptoms    IGT (impaired glucose tolerance)  -     Hemoglobin A1C; Future; Expected date: 05/01/2024  -     TSH; Future; Expected date: 05/01/2024  -     Hemoglobin A1C; Future; Expected date: 10/28/2024    Class 2 obesity with body mass index (BMI) of 36.0 to 36.9 in adult, unspecified obesity type, unspecified whether serious comorbidity present    History of gastric ulcer    JILLIAN (obstructive sleep apnea)    Screening for malignant neoplasm of prostate  -     PSA, Screening; Future; Expected date: 05/01/2024    Prolactinoma  -     PROLACTIN; Future; Expected date: 05/01/2024    Smoker  -     CT Chest Lung Screening Low Dose; Future; Expected date: 05/01/2024      Labs now  Lifestyle modifications  Ct lung , declines smoking cessation  Return 6 mo    I participate in the chronic care mgt of this pt. See above note.

## 2024-05-02 ENCOUNTER — LAB VISIT (OUTPATIENT)
Dept: LAB | Facility: HOSPITAL | Age: 54
End: 2024-05-02
Attending: NURSE PRACTITIONER
Payer: COMMERCIAL

## 2024-05-02 ENCOUNTER — PATIENT MESSAGE (OUTPATIENT)
Dept: INTERNAL MEDICINE | Facility: CLINIC | Age: 54
End: 2024-05-02
Payer: COMMERCIAL

## 2024-05-02 DIAGNOSIS — R73.02 IGT (IMPAIRED GLUCOSE TOLERANCE): ICD-10-CM

## 2024-05-02 DIAGNOSIS — Z12.5 SCREENING FOR MALIGNANT NEOPLASM OF PROSTATE: ICD-10-CM

## 2024-05-02 DIAGNOSIS — I10 PRIMARY HYPERTENSION: ICD-10-CM

## 2024-05-02 DIAGNOSIS — D35.2 PROLACTINOMA: ICD-10-CM

## 2024-05-02 LAB
ALBUMIN SERPL BCP-MCNC: 3.5 G/DL (ref 3.5–5.2)
ALP SERPL-CCNC: 78 U/L (ref 55–135)
ALT SERPL W/O P-5'-P-CCNC: 17 U/L (ref 10–44)
ANION GAP SERPL CALC-SCNC: 8 MMOL/L (ref 8–16)
AST SERPL-CCNC: 17 U/L (ref 10–40)
BASOPHILS # BLD AUTO: 0.05 K/UL (ref 0–0.2)
BASOPHILS NFR BLD: 0.7 % (ref 0–1.9)
BILIRUB SERPL-MCNC: 0.5 MG/DL (ref 0.1–1)
BUN SERPL-MCNC: 18 MG/DL (ref 6–20)
CALCIUM SERPL-MCNC: 9.2 MG/DL (ref 8.7–10.5)
CHLORIDE SERPL-SCNC: 106 MMOL/L (ref 95–110)
CHOLEST SERPL-MCNC: 151 MG/DL (ref 120–199)
CHOLEST/HDLC SERPL: 4.4 {RATIO} (ref 2–5)
CO2 SERPL-SCNC: 23 MMOL/L (ref 23–29)
COMPLEXED PSA SERPL-MCNC: 0.8 NG/ML (ref 0–4)
CREAT SERPL-MCNC: 1.5 MG/DL (ref 0.5–1.4)
DIFFERENTIAL METHOD BLD: ABNORMAL
EOSINOPHIL # BLD AUTO: 0.5 K/UL (ref 0–0.5)
EOSINOPHIL NFR BLD: 6.9 % (ref 0–8)
ERYTHROCYTE [DISTWIDTH] IN BLOOD BY AUTOMATED COUNT: 14.8 % (ref 11.5–14.5)
EST. GFR  (NO RACE VARIABLE): 54.6 ML/MIN/1.73 M^2
ESTIMATED AVG GLUCOSE: 108 MG/DL (ref 68–131)
GLUCOSE SERPL-MCNC: 89 MG/DL (ref 70–110)
HBA1C MFR BLD: 5.4 % (ref 4–5.6)
HCT VFR BLD AUTO: 44.3 % (ref 40–54)
HDLC SERPL-MCNC: 34 MG/DL (ref 40–75)
HDLC SERPL: 22.5 % (ref 20–50)
HGB BLD-MCNC: 14.4 G/DL (ref 14–18)
IMM GRANULOCYTES # BLD AUTO: 0.02 K/UL (ref 0–0.04)
IMM GRANULOCYTES NFR BLD AUTO: 0.3 % (ref 0–0.5)
LDLC SERPL CALC-MCNC: 99.6 MG/DL (ref 63–159)
LYMPHOCYTES # BLD AUTO: 2 K/UL (ref 1–4.8)
LYMPHOCYTES NFR BLD: 27.6 % (ref 18–48)
MCH RBC QN AUTO: 29.1 PG (ref 27–31)
MCHC RBC AUTO-ENTMCNC: 32.5 G/DL (ref 32–36)
MCV RBC AUTO: 90 FL (ref 82–98)
MONOCYTES # BLD AUTO: 0.5 K/UL (ref 0.3–1)
MONOCYTES NFR BLD: 6.7 % (ref 4–15)
NEUTROPHILS # BLD AUTO: 4.3 K/UL (ref 1.8–7.7)
NEUTROPHILS NFR BLD: 57.8 % (ref 38–73)
NONHDLC SERPL-MCNC: 117 MG/DL
NRBC BLD-RTO: 0 /100 WBC
PLATELET # BLD AUTO: 251 K/UL (ref 150–450)
PMV BLD AUTO: 10.8 FL (ref 9.2–12.9)
POTASSIUM SERPL-SCNC: 4.4 MMOL/L (ref 3.5–5.1)
PROLACTIN SERPL IA-MCNC: 0.8 NG/ML (ref 3.5–19.4)
PROT SERPL-MCNC: 7 G/DL (ref 6–8.4)
RBC # BLD AUTO: 4.95 M/UL (ref 4.6–6.2)
SODIUM SERPL-SCNC: 137 MMOL/L (ref 136–145)
TRIGL SERPL-MCNC: 87 MG/DL (ref 30–150)
TSH SERPL DL<=0.005 MIU/L-ACNC: 1.06 UIU/ML (ref 0.4–4)
WBC # BLD AUTO: 7.35 K/UL (ref 3.9–12.7)

## 2024-05-02 PROCEDURE — 36415 COLL VENOUS BLD VENIPUNCTURE: CPT | Performed by: NURSE PRACTITIONER

## 2024-05-02 PROCEDURE — 80053 COMPREHEN METABOLIC PANEL: CPT | Performed by: NURSE PRACTITIONER

## 2024-05-02 PROCEDURE — 83036 HEMOGLOBIN GLYCOSYLATED A1C: CPT | Performed by: NURSE PRACTITIONER

## 2024-05-02 PROCEDURE — 80061 LIPID PANEL: CPT | Performed by: NURSE PRACTITIONER

## 2024-05-02 PROCEDURE — 84146 ASSAY OF PROLACTIN: CPT | Performed by: NURSE PRACTITIONER

## 2024-05-02 PROCEDURE — 84153 ASSAY OF PSA TOTAL: CPT | Performed by: NURSE PRACTITIONER

## 2024-05-02 PROCEDURE — 85025 COMPLETE CBC W/AUTO DIFF WBC: CPT | Performed by: NURSE PRACTITIONER

## 2024-05-02 PROCEDURE — 84443 ASSAY THYROID STIM HORMONE: CPT | Performed by: NURSE PRACTITIONER

## 2024-07-24 ENCOUNTER — PATIENT MESSAGE (OUTPATIENT)
Dept: HEPATOLOGY | Facility: CLINIC | Age: 54
End: 2024-07-24
Payer: COMMERCIAL

## 2024-10-26 DIAGNOSIS — I10 PRIMARY HYPERTENSION: ICD-10-CM

## 2024-10-29 ENCOUNTER — PATIENT OUTREACH (OUTPATIENT)
Dept: ADMINISTRATIVE | Facility: HOSPITAL | Age: 54
End: 2024-10-29
Payer: COMMERCIAL

## 2024-10-29 DIAGNOSIS — N18.30 STAGE 3 CHRONIC KIDNEY DISEASE, UNSPECIFIED WHETHER STAGE 3A OR 3B CKD: Primary | ICD-10-CM

## 2024-10-29 RX ORDER — AMLODIPINE BESYLATE 10 MG/1
10 TABLET ORAL
Qty: 90 TABLET | Refills: 0 | Status: SHIPPED | OUTPATIENT
Start: 2024-10-29

## 2024-11-12 ENCOUNTER — DOCUMENTATION ONLY (OUTPATIENT)
Dept: INTERNAL MEDICINE | Facility: CLINIC | Age: 54
End: 2024-11-12
Payer: COMMERCIAL

## 2025-01-27 DIAGNOSIS — I10 PRIMARY HYPERTENSION: ICD-10-CM

## 2025-01-27 RX ORDER — POTASSIUM CHLORIDE 750 MG/1
20 TABLET, EXTENDED RELEASE ORAL
Qty: 180 TABLET | Refills: 0 | OUTPATIENT
Start: 2025-01-27

## 2025-01-27 NOTE — TELEPHONE ENCOUNTER
Care Due:                  Date            Visit Type   Department     Provider  --------------------------------------------------------------------------------                                EP -                              PRIMARY      [facility] INTER[facility]  Last Visit: 10-      CARE ([facility])   MEDICINE       Ed Banks  Next Visit: None Scheduled  None         None Found                                                            Last  Test          Frequency    Reason                     Performed    Due Date  --------------------------------------------------------------------------------    Office Visit  15 months..  hydrALAZINE, potassium...  10-   01-    [facility] Embedded Care Due Messages. Reference number: [id number].   1/27/2025 6:31:59 AM CST

## 2025-01-30 RX ORDER — AMLODIPINE BESYLATE 10 MG/1
10 TABLET ORAL
Qty: 90 TABLET | Refills: 0 | Status: SHIPPED | OUTPATIENT
Start: 2025-01-30

## 2025-02-26 DIAGNOSIS — D35.3 BENIGN NEOPLASM OF PITUITARY GLAND AND CRANIOPHARYNGEAL DUCT (POUCH): ICD-10-CM

## 2025-02-26 DIAGNOSIS — Z12.5 PROSTATE CANCER SCREENING: ICD-10-CM

## 2025-02-26 DIAGNOSIS — D35.2 PROLACTINOMA: ICD-10-CM

## 2025-02-26 DIAGNOSIS — D35.2 BENIGN NEOPLASM OF PITUITARY GLAND AND CRANIOPHARYNGEAL DUCT (POUCH): ICD-10-CM

## 2025-02-26 DIAGNOSIS — I10 HYPERTENSION, UNSPECIFIED TYPE: ICD-10-CM

## 2025-02-26 DIAGNOSIS — N52.9 ERECTILE DYSFUNCTION, UNSPECIFIED ERECTILE DYSFUNCTION TYPE: ICD-10-CM

## 2025-02-26 DIAGNOSIS — E29.1 HYPOGONADISM IN MALE: ICD-10-CM

## 2025-02-26 RX ORDER — POTASSIUM CHLORIDE 750 MG/1
20 TABLET, EXTENDED RELEASE ORAL DAILY
Qty: 10 TABLET | Refills: 0 | Status: SHIPPED | OUTPATIENT
Start: 2025-02-26 | End: 2025-03-03

## 2025-02-26 RX ORDER — CABERGOLINE 0.5 MG/1
TABLET ORAL
Qty: 10 TABLET | Refills: 0 | Status: SHIPPED | OUTPATIENT
Start: 2025-02-26 | End: 2025-03-03

## 2025-02-26 RX ORDER — SILDENAFIL CITRATE 20 MG/1
TABLET ORAL
Qty: 50 TABLET | Refills: 11 | OUTPATIENT
Start: 2025-02-26

## 2025-02-26 RX ORDER — HYDRALAZINE HYDROCHLORIDE 50 MG/1
50 TABLET, FILM COATED ORAL 3 TIMES DAILY
Qty: 15 TABLET | Refills: 0 | Status: SHIPPED | OUTPATIENT
Start: 2025-02-26 | End: 2025-03-03

## 2025-02-26 RX ORDER — POTASSIUM CHLORIDE 750 MG/1
20 TABLET, EXTENDED RELEASE ORAL
Qty: 180 TABLET | Refills: 3 | OUTPATIENT
Start: 2025-02-26

## 2025-02-26 NOTE — TELEPHONE ENCOUNTER
No care due was identified.  [facility] Embedded Care Due Messages. Reference number: [id number].   2/26/2025 9:25:42 AM CST

## 2025-05-03 DIAGNOSIS — I10 PRIMARY HYPERTENSION: ICD-10-CM

## 2025-05-05 RX ORDER — AMLODIPINE BESYLATE 10 MG/1
10 TABLET ORAL
Qty: 90 TABLET | Refills: 0 | OUTPATIENT
Start: 2025-05-05

## 2025-05-05 NOTE — TELEPHONE ENCOUNTER
Care Due:                  Date            Visit Type   Department     Provider  --------------------------------------------------------------------------------                                EP -                              PRIMARY      [facility] INTER[facility]  Last Visit: 10-      CARE ([facility])   MEDICINE       Ed Banks  Next Visit: None Scheduled  None         None Found                                                            Last  Test          Frequency    Reason                     Performed    Due Date  --------------------------------------------------------------------------------    Office Visit  15 months..  hydrALAZINE..............  10-   01-    CBC.........  12 months..  hydrALAZINE..............  05- 04-    [facility] Embedded Care Due Messages. Reference number: [id number].   5/05/2025 8:32:21 AM CDT

## 2025-05-05 NOTE — TELEPHONE ENCOUNTER
Refill Routing Note   Medication(s) are not appropriate for processing by [facility] for the following reason(s):        Patient not seen by provider within 15 months  Required vitals outdated    [facility] action(s):  Defer   Requires appointment : Yes     Requires labs : Yes             Appointments  past 12m or future 3m with PCP    Date Provider   Last Visit   10/11/2023 Ed Banks MD   Next Visit   Visit date not found Ed Banks MD   ED visits in past 90 days: 0        Note composed:8:54 AM 05/05/2025

## 2025-05-07 NOTE — TELEPHONE ENCOUNTER
Provider Staff:  Please note Refusal of medication.     Action required for this patient.      Requested Prescriptions     Refused Prescriptions Disp Refills    amLODIPine (NORVASC) 10 MG tablet [Pharmacy Med Name: amLODIPine Besylate 10 MG Oral Tablet] 90 tablet 0     Sig: Take 1 tablet by mouth once daily     Refused By: CHRISTINE RIVERA     Reason for Refusal: Patient needs an appointment      Thanks!  [facility]   Note composed: 05/06/2025 7:52 PM